# Patient Record
Sex: MALE | Race: WHITE | NOT HISPANIC OR LATINO
[De-identification: names, ages, dates, MRNs, and addresses within clinical notes are randomized per-mention and may not be internally consistent; named-entity substitution may affect disease eponyms.]

---

## 2017-04-21 PROBLEM — Z00.00 ENCOUNTER FOR PREVENTIVE HEALTH EXAMINATION: Status: ACTIVE | Noted: 2017-04-21

## 2017-08-25 ENCOUNTER — APPOINTMENT (OUTPATIENT)
Dept: VASCULAR SURGERY | Facility: CLINIC | Age: 62
End: 2017-08-25
Payer: MEDICAID

## 2017-08-25 DIAGNOSIS — I83.892 VARICOSE VEINS OF LEFT LOWER EXTREMITY WITH OTHER COMPLICATIONS: ICD-10-CM

## 2017-08-25 PROCEDURE — 93971 EXTREMITY STUDY: CPT

## 2017-08-25 PROCEDURE — 99213 OFFICE O/P EST LOW 20 MIN: CPT | Mod: 25

## 2017-10-20 ENCOUNTER — APPOINTMENT (OUTPATIENT)
Dept: VASCULAR SURGERY | Facility: CLINIC | Age: 62
End: 2017-10-20
Payer: MEDICAID

## 2017-10-20 PROCEDURE — 36475 ENDOVENOUS RF 1ST VEIN: CPT

## 2017-10-23 ENCOUNTER — APPOINTMENT (OUTPATIENT)
Dept: VASCULAR SURGERY | Facility: CLINIC | Age: 62
End: 2017-10-23
Payer: MEDICAID

## 2017-10-23 PROCEDURE — 93971 EXTREMITY STUDY: CPT

## 2017-10-23 PROCEDURE — 99212 OFFICE O/P EST SF 10 MIN: CPT | Mod: 25

## 2017-11-27 ENCOUNTER — APPOINTMENT (OUTPATIENT)
Dept: VASCULAR SURGERY | Facility: CLINIC | Age: 62
End: 2017-11-27
Payer: MEDICAID

## 2017-11-27 PROCEDURE — 99212 OFFICE O/P EST SF 10 MIN: CPT

## 2020-12-14 ENCOUNTER — EMERGENCY (EMERGENCY)
Facility: HOSPITAL | Age: 65
LOS: 1 days | Discharge: HOME | End: 2020-12-14
Admitting: EMERGENCY MEDICINE
Payer: MEDICARE

## 2020-12-14 VITALS
OXYGEN SATURATION: 96 % | HEART RATE: 81 BPM | RESPIRATION RATE: 18 BRPM | DIASTOLIC BLOOD PRESSURE: 96 MMHG | WEIGHT: 179.9 LBS | SYSTOLIC BLOOD PRESSURE: 136 MMHG | TEMPERATURE: 98 F

## 2020-12-14 DIAGNOSIS — M25.511 PAIN IN RIGHT SHOULDER: ICD-10-CM
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
## 2020-12-14 DIAGNOSIS — S42.291A OTHER DISPLACED FRACTURE OF UPPER END OF RIGHT HUMERUS, INITIAL ENCOUNTER FOR CLOSED FRACTURE: ICD-10-CM
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
## 2020-12-14 DIAGNOSIS — M25.521 PAIN IN RIGHT ELBOW: ICD-10-CM
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
## 2020-12-14 PROCEDURE — 73030 X-RAY EXAM OF SHOULDER: CPT | Mod: 26,RT

## 2020-12-14 PROCEDURE — 23600 CLTX PROX HUMRL FX W/O MNPJ: CPT | Mod: 54,RT

## 2020-12-14 PROCEDURE — 99284 EMERGENCY DEPT VISIT MOD MDM: CPT | Mod: 25,57

## 2020-12-14 PROCEDURE — 73060 X-RAY EXAM OF HUMERUS: CPT | Mod: 26,RT

## 2020-12-14 PROCEDURE — 73080 X-RAY EXAM OF ELBOW: CPT | Mod: 26,RT

## 2020-12-14 NOTE — ED PROVIDER NOTE - CHPI ED SYMPTOMS NEG
no weakness/no numbness/no dizziness, no headache, no chest pain, no shortness of breath, no nausea, and no vomiting.

## 2020-12-14 NOTE — ED PROVIDER NOTE - MUSCULOSKELETAL, MLM
Tenderness along the lateral aspect of the right shoulder and is mildly tender along the posterior right elbow. He also has a small amount of ecchymosis to the proximal right arm. No obvious deformity. No palpable crepitus. NVID.

## 2020-12-14 NOTE — ED PROVIDER NOTE - OBJECTIVE STATEMENT
64 y/o Male presents with RUE pain for the past 1 week which worsened yesterday. He states he slipped and fell onto the right shoulder and right upper arm 1 week ago causing severe pain to the site that is worsened with movement. He states he has been waiting for the pain to improve but as he went to open the fridge yesterday, the door was stuck and as he pulled the door he felt a sudden severe pain to the right shoulder and elbow. He states he now has difficulty moving the shoulder and elbow due to pain. Pt is concerned he may have an occult fracture that was never treated as he did not seek medical treatment at that time. No other associated injuries.   Denies paresthesias, numbness, weakness, open wound, dizziness, headache, chest pain, shortness of breath, nausea and vomiting. 66 y/o Male presents with RUE pain for the past 1 week which worsened yesterday. He states he slipped and fell onto the right shoulder and right upper arm 1 week ago causing severe pain to the site that is worsened with movement. He states he has been waiting for the pain to improve but as he went to open the fridge yesterday, the door was stuck and as he pulled the door he felt a sudden severe pain to the right shoulder and elbow. He states he now has difficulty moving the shoulder and elbow due to pain. Pt is concerned he may have an occult fracture that was never treated as he did not seek medical treatment at that time. No other associated injuries.   Denies paresthesias, numbness, weakness, open wound, dizziness, headache, chest pain, shortness of breath, nausea and vomiting.

## 2020-12-14 NOTE — ED PROVIDER NOTE - CARE PROVIDER_API CALL
Alejandro Hess  ORTHOPAEDIC SURGERY  [address], [address] [place] [address]  Phone: [phone]  Fax: [phone]  Follow Up Time:

## 2020-12-14 NOTE — ED PROVIDER NOTE - CLINICAL SUMMARY MEDICAL DECISION MAKING FREE TEXT BOX
Xrays reveal a fracture of the right humerus. RUE was placed in a sling without complication. NV status intact. Supportive care instructions and pt agrees to F/U with Ortho this week. Strict return precautions reviewed with pt in which pt verbalizes understanding and agrees to.

## 2020-12-14 NOTE — ED PROVIDER NOTE - PATIENT PORTAL LINK FT
You can access the FollowMyHealth Patient Portal offered by [facility] by registering at the following website: [email or web address]. By joining [employer]’s FollowMyHealth portal, you will also be able to view your health information using other applications (apps) compatible with our system.

## 2020-12-14 NOTE — ED PROVIDER NOTE - NSFOLLOWUPINSTRUCTIONS_ED_ALL_ED_FT
Right Humerus Fracture    A fracture is a break in one of your bones. This can occur from a variety of injuries, especially traumatic ones. Symptoms include pain, bruising, or swelling. Do not use the injured limb. If a fracture is in one of the bones below your waist, do not put weight on that limb unless instructed to do so by your healthcare provider. Crutches or a cane may have been provided. A splint or cast may have been applied by your health care provider. Make sure to keep it dry and follow up with an orthopedist as instructed.    Please follow up with Dr. Hess (Orthopedist) in 1-3 days for further evaluation and management as discussed.    SEEK IMMEDIATE MEDICAL CARE IF YOU HAVE ANY OF THE FOLLOWING SYMPTOMS: numbness, tingling, increasing pain, or weakness in any part of the injured limb.

## 2020-12-23 ENCOUNTER — EMERGENCY (EMERGENCY)
Facility: HOSPITAL | Age: 65
LOS: 1 days | Discharge: HOME | End: 2020-12-23
Attending: EMERGENCY MEDICINE | Admitting: EMERGENCY MEDICINE
Payer: MEDICARE

## 2020-12-23 VITALS
OXYGEN SATURATION: 97 % | DIASTOLIC BLOOD PRESSURE: 87 MMHG | TEMPERATURE: 98 F | SYSTOLIC BLOOD PRESSURE: 121 MMHG | WEIGHT: 160.06 LBS | RESPIRATION RATE: 18 BRPM | HEART RATE: 86 BPM | HEIGHT: 67 IN
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
## 2020-12-23 DIAGNOSIS — F10.129 ALCOHOL ABUSE WITH INTOXICATION, UNSPECIFIED: ICD-10-CM
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
## 2020-12-23 DIAGNOSIS — S01.81XA LACERATION WITHOUT FOREIGN BODY OF OTHER PART OF HEAD, INITIAL ENCOUNTER: ICD-10-CM

## 2020-12-23 DIAGNOSIS — Z23 ENCOUNTER FOR IMMUNIZATION: ICD-10-CM

## 2020-12-23 PROCEDURE — 70486 CT MAXILLOFACIAL W/O DYE: CPT | Mod: 26

## 2020-12-23 PROCEDURE — 70450 CT HEAD/BRAIN W/O DYE: CPT | Mod: 26

## 2020-12-23 PROCEDURE — 99285 EMERGENCY DEPT VISIT HI MDM: CPT

## 2020-12-23 RX ORDER — TETANUS TOXOID, REDUCED DIPHTHERIA TOXOID AND ACELLULAR PERTUSSIS VACCINE, ADSORBED 5; 2.5; 8; 8; 2.5 [IU]/.5ML; [IU]/.5ML; UG/.5ML; UG/.5ML; UG/.5ML
0.5 SUSPENSION INTRAMUSCULAR ONCE
Refills: 0 | Status: COMPLETED | OUTPATIENT
Start: 2020-12-23 | End: 2020-12-23

## 2020-12-23 RX ADMIN — TETANUS TOXOID, REDUCED DIPHTHERIA TOXOID AND ACELLULAR PERTUSSIS VACCINE, ADSORBED 0.5 MILLILITER(S): 5; 2.5; 8; 8; 2.5 SUSPENSION INTRAMUSCULAR at 23:17

## 2020-12-23 NOTE — ED ADULT TRIAGE NOTE - CHIEF COMPLAINT QUOTE
BIBA for trip and fall sustaining laceration to left temporal area. Bleeding controlled. Admits to alcohol use today. Pt states he takes aspirin.

## 2020-12-24 VITALS
DIASTOLIC BLOOD PRESSURE: 75 MMHG | OXYGEN SATURATION: 97 % | TEMPERATURE: 98 F | HEART RATE: 69 BPM | RESPIRATION RATE: 17 BRPM | SYSTOLIC BLOOD PRESSURE: 137 MMHG

## 2020-12-24 LAB — ETHANOL SERPL-MCNC: 152 MG/DL — HIGH

## 2020-12-24 RX ORDER — HALOPERIDOL DECANOATE 100 MG/ML
2.5 INJECTION INTRAMUSCULAR ONCE
Refills: 0 | Status: DISCONTINUED | OUTPATIENT
Start: 2020-12-24 | End: 2020-12-24

## 2020-12-24 RX ORDER — HALOPERIDOL DECANOATE 100 MG/ML
2.5 INJECTION INTRAMUSCULAR ONCE
Refills: 0 | Status: COMPLETED | OUTPATIENT
Start: 2020-12-24 | End: 2020-12-24

## 2020-12-24 RX ADMIN — Medication 1 TABLET(S): at 00:02

## 2020-12-24 RX ADMIN — Medication 50 MILLIGRAM(S): at 04:26

## 2020-12-24 RX ADMIN — HALOPERIDOL DECANOATE 2.5 MILLIGRAM(S): 100 INJECTION INTRAMUSCULAR at 05:23

## 2020-12-24 RX ADMIN — Medication 1 MILLIGRAM(S): at 05:23

## 2020-12-24 NOTE — ED ADULT NURSE REASSESSMENT NOTE - NS ED NURSE REASSESS COMMENT FT1
Pt witnessed lowering self to ground, no head trauma or other injuries, transferred back to bed in stable condition, VS per flowsheet. JATIN Simms notified and aware. Pt is awake and alert x4, no neuro deficits on assessment. Call bell within reach, stretcher alarm in place, pt verbalizes understanding of when to call ED staff if requiring assistance to use bathroom or other inquiries. Pt witnessed lowering self to ground, no head trauma or other injuries, transferred back to bed in stable condition, VS per flowsheet. JATIN Simms notified and aware. Pt is awake and alert x4, no LoC, no neuro deficits on assessment. Call bell within reach, stretcher alarm in place, pt verbalizes understanding of when to call ED staff if requiring assistance to use bathroom or other inquiries. Pt witnessed lowering self to ground, no head trauma or other injuries, transferred back to bed in stable condition, VS per flowsheet. States "I wanted to throw out this trash in the bin and  the trash on the floor". JATIN Simms notified and aware. Pt is awake and alert x4, no LoC, no neuro deficits on assessment. Call bell within reach, stretcher alarm in place, pt verbalizes understanding of when to call ED staff if requiring assistance to use bathroom or other inquiries.

## 2020-12-24 NOTE — ED ADULT NURSE REASSESSMENT NOTE - NS ED NURSE REASSESS COMMENT FT1
Patient continues to attempt to get out of bed, unsteady on his feet. Patient is aox4 but adamant about 'finding his bottle of vodka.' Patient not following directions. As per provider, patient medicated as ordered and attached to continuos pulse ox monitoring. Safety precautions in place and maintained. Saturating at 99% RA. Fall precautions also in place including bed alarm, red blanket and socks.

## 2020-12-24 NOTE — ED ADULT NURSE REASSESSMENT NOTE - GENERAL PATIENT STATE
awakens with ease/comfortable appearance/cooperative/resting/sleeping
comfortable appearance/resting/sleeping

## 2020-12-24 NOTE — ED ADULT NURSE REASSESSMENT NOTE - COMFORT CARE
plan of care explained/wait time explained
plan of care explained/side rails up/wait time explained/warm blanket provided

## 2020-12-24 NOTE — ED PROVIDER NOTE - CLINICAL SUMMARY MEDICAL DECISION MAKING FREE TEXT BOX
ams, possibly 2/2 substance, no trauma noted or reported, protecting airway, will monitor for safety and reassessment for mental status  Patient endorses [place] down [place] [address] after drinking.   lac to left temple linear.   Will observe till sobriety

## 2020-12-24 NOTE — ED PROVIDER NOTE - PHYSICAL EXAMINATION
CON: somnolent, arousable to loud verbal or painful stimuli,   HENMT: no pooling of secretion, protecting airway, no facial ecchymosis or evidence of trauma, perrl  HEAD: no obvious hematoma or laceration,   CV: rrr,   RESP: chest rise, breathing spontaneously,   GI: soft,   SKIN: lac to left temple small no bleeding  MSK: no deformity noted,   NEURO: limited exam 2/2 mental status

## 2020-12-24 NOTE — ED PROVIDER NOTE - PATIENT PORTAL LINK FT
You can access the FollowMyHealth Patient Portal offered by [facility] by registering at the following website: [email or web address]. By joining [employer]’s FollowMyHealth portal, you will also be able to view your health information using other applications (apps) compatible with our system.

## 2020-12-24 NOTE — ED ADULT NURSE REASSESSMENT NOTE - NSIMPLEMENTINTERV_GEN_ALL_ED
Implemented All Fall with Harm Risk Interventions:  [place] to call system. Call bell, personal items and telephone within reach. Instruct patient to call for assistance. Room bathroom lighting operational. Non-slip footwear when patient is off stretcher. Physically safe environment: no spills, clutter or unnecessary equipment. Stretcher in lowest position, wheels locked, appropriate side rails in place. Provide visual cue, wrist band, yellow gown, etc. Monitor gait and stability. Monitor for mental status changes and reorient to person, place, and time. Review medications for side effects contributing to fall risk. Reinforce activity limits and safety measures with patient and family. Provide visual clues: red socks.

## 2020-12-24 NOTE — ED ADULT NURSE NOTE - NSIMPLEMENTINTERV_GEN_ALL_ED
Implemented All Fall Risk Interventions:  [place] to call system. Call bell, personal items and telephone within reach. Instruct patient to call for assistance. Room bathroom lighting operational. Non-slip footwear when patient is off stretcher. Physically safe environment: no spills, clutter or unnecessary equipment. Stretcher in lowest position, wheels locked, appropriate side rails in place. Provide visual cue, wrist band, yellow gown, etc. Monitor gait and stability. Monitor for mental status changes and reorient to person, place, and time. Review medications for side effects contributing to fall risk. Reinforce activity limits and safety measures with patient and family.

## 2020-12-24 NOTE — ED ADULT NURSE NOTE - OBJECTIVE STATEMENT
65y male presents to ED c/o mechanical fall. States tripped and fell, +laceration to L forehead. Takes aspirin daily. Pt is AOx4, no neuro deficits present on arrival. Denies LoC, weakness, dizziness, n/v. 65y male presents to ED c/o mechanical fall. States tripped and fell, +laceration to L forehead. Takes aspirin daily. States has been having R arm pain prior to fall. Pt is AOx4, no neuro deficits present on arrival. Denies LoC, weakness, dizziness, n/v.

## 2020-12-24 NOTE — ED PROVIDER NOTE - PROGRESS NOTE DETAILS
pt signed out in AM, S/P sedation for alcohol intox and unsteady gait. Pt now alert, oriented, walking steadily. WIll DC home

## 2021-03-25 NOTE — ED PROVIDER NOTE - INTERNATIONAL TRAVEL
Problem: Pain:  Goal: Pain level will decrease  Description: Pain level will decrease  Outcome: Met This Shift  Goal: Control of acute pain  Description: Control of acute pain  Outcome: Met This Shift     Problem:  Bowel/Gastric:  Goal: Bowel function will improve  Description: Bowel function will improve  Outcome: Met This Shift  Goal: Diagnostic test results will improve  Description: Diagnostic test results will improve  Outcome: Met This Shift  Goal: Occurrences of nausea will decrease  Description: Occurrences of nausea will decrease  Outcome: Met This Shift  Goal: Occurrences of vomiting will decrease  Description: Occurrences of vomiting will decrease  Outcome: Met This Shift     Problem: Fluid Volume:  Goal: Maintenance of adequate hydration will improve  Description: Maintenance of adequate hydration will improve  Outcome: Met This Shift
ambulatory
No

## 2021-12-15 ENCOUNTER — EMERGENCY (EMERGENCY)
Facility: HOSPITAL | Age: 66
LOS: 1 days | Discharge: HOME | End: 2021-12-15
Attending: EMERGENCY MEDICINE | Admitting: EMERGENCY MEDICINE
Payer: MEDICARE

## 2021-12-15 VITALS
RESPIRATION RATE: 18 BRPM | TEMPERATURE: 98 F | HEIGHT: 67 IN | DIASTOLIC BLOOD PRESSURE: 96 MMHG | SYSTOLIC BLOOD PRESSURE: 124 MMHG | WEIGHT: 149.91 LBS | OXYGEN SATURATION: 98 % | HEART RATE: 87 BPM

## 2021-12-15 VITALS
DIASTOLIC BLOOD PRESSURE: 81 MMHG | RESPIRATION RATE: 17 BRPM | OXYGEN SATURATION: 99 % | SYSTOLIC BLOOD PRESSURE: 119 MMHG | TEMPERATURE: 98 F | HEART RATE: 81 BPM

## 2021-12-15 DIAGNOSIS — F32.9 MAJOR DEPRESSIVE DISORDER, SINGLE EPISODE, UNSPECIFIED: ICD-10-CM

## 2021-12-15 DIAGNOSIS — R21 RASH AND OTHER NONSPECIFIC SKIN ERUPTION: ICD-10-CM

## 2021-12-15 PROBLEM — F10.10 ALCOHOL ABUSE, UNCOMPLICATED: Chronic | Status: ACTIVE | Noted: 2020-12-24

## 2021-12-15 LAB
ALBUMIN SERPL ELPH-MCNC: 3.2 G/DL — LOW (ref 3.4–5)
ALP SERPL-CCNC: 90 U/L — SIGNIFICANT CHANGE UP (ref 40–120)
ALT FLD-CCNC: 36 U/L — SIGNIFICANT CHANGE UP (ref 12–42)
ANION GAP SERPL CALC-SCNC: 9 MMOL/L — SIGNIFICANT CHANGE UP (ref 9–16)
APTT BLD: 37.5 SEC — HIGH (ref 27.5–35.5)
AST SERPL-CCNC: 25 U/L — SIGNIFICANT CHANGE UP (ref 15–37)
BASOPHILS # BLD AUTO: 0.09 K/UL — SIGNIFICANT CHANGE UP (ref 0–0.2)
BASOPHILS NFR BLD AUTO: 1.1 % — SIGNIFICANT CHANGE UP (ref 0–2)
BILIRUB SERPL-MCNC: 0.2 MG/DL — SIGNIFICANT CHANGE UP (ref 0.2–1.2)
BUN SERPL-MCNC: 14 MG/DL — SIGNIFICANT CHANGE UP (ref 7–23)
CALCIUM SERPL-MCNC: 8.2 MG/DL — LOW (ref 8.5–10.5)
CHLORIDE SERPL-SCNC: 106 MMOL/L — SIGNIFICANT CHANGE UP (ref 96–108)
CO2 SERPL-SCNC: 28 MMOL/L — SIGNIFICANT CHANGE UP (ref 22–31)
CREAT SERPL-MCNC: 1.05 MG/DL — SIGNIFICANT CHANGE UP (ref 0.5–1.3)
EOSINOPHIL # BLD AUTO: 0.36 K/UL — SIGNIFICANT CHANGE UP (ref 0–0.5)
EOSINOPHIL NFR BLD AUTO: 4.5 % — SIGNIFICANT CHANGE UP (ref 0–6)
GLUCOSE SERPL-MCNC: 136 MG/DL — HIGH (ref 70–99)
HCT VFR BLD CALC: 42.4 % — SIGNIFICANT CHANGE UP (ref 39–50)
HGB BLD-MCNC: 15 G/DL — SIGNIFICANT CHANGE UP (ref 13–17)
HIV 1 & 2 AB SERPL IA.RAPID: SIGNIFICANT CHANGE UP
IMM GRANULOCYTES NFR BLD AUTO: 0.5 % — SIGNIFICANT CHANGE UP (ref 0–1.5)
INR BLD: 0.98 — SIGNIFICANT CHANGE UP (ref 0.88–1.16)
LG PLATELETS BLD QL AUTO: SLIGHT — SIGNIFICANT CHANGE UP
LYMPHOCYTES # BLD AUTO: 2.92 K/UL — SIGNIFICANT CHANGE UP (ref 1–3.3)
LYMPHOCYTES # BLD AUTO: 36.6 % — SIGNIFICANT CHANGE UP (ref 13–44)
MACROCYTES BLD QL: SLIGHT — SIGNIFICANT CHANGE UP
MANUAL SMEAR VERIFICATION: SIGNIFICANT CHANGE UP
MCHC RBC-ENTMCNC: 33.3 PG — SIGNIFICANT CHANGE UP (ref 27–34)
MCHC RBC-ENTMCNC: 35.4 GM/DL — SIGNIFICANT CHANGE UP (ref 32–36)
MCV RBC AUTO: 94.2 FL — SIGNIFICANT CHANGE UP (ref 80–100)
MICROCYTES BLD QL: SLIGHT — SIGNIFICANT CHANGE UP
MONOCYTES # BLD AUTO: 1.17 K/UL — HIGH (ref 0–0.9)
MONOCYTES NFR BLD AUTO: 14.7 % — HIGH (ref 2–14)
NEUTROPHILS # BLD AUTO: 3.39 K/UL — SIGNIFICANT CHANGE UP (ref 1.8–7.4)
NEUTROPHILS NFR BLD AUTO: 42.6 % — LOW (ref 43–77)
NRBC # BLD: 0 /100 WBCS — SIGNIFICANT CHANGE UP (ref 0–0)
PLAT MORPH BLD: NORMAL — SIGNIFICANT CHANGE UP
PLATELET # BLD AUTO: 343 K/UL — SIGNIFICANT CHANGE UP (ref 150–400)
PLATELET COUNT - ESTIMATE: ABNORMAL
POLYCHROMASIA BLD QL SMEAR: SLIGHT — SIGNIFICANT CHANGE UP
POTASSIUM SERPL-MCNC: 3.5 MMOL/L — SIGNIFICANT CHANGE UP (ref 3.5–5.3)
POTASSIUM SERPL-SCNC: 3.5 MMOL/L — SIGNIFICANT CHANGE UP (ref 3.5–5.3)
PROT SERPL-MCNC: 6.4 G/DL — SIGNIFICANT CHANGE UP (ref 6.4–8.2)
PROTHROM AB SERPL-ACNC: 11.6 SEC — SIGNIFICANT CHANGE UP (ref 10.6–13.6)
RBC # BLD: 4.5 M/UL — SIGNIFICANT CHANGE UP (ref 4.2–5.8)
RBC # FLD: 13 % — SIGNIFICANT CHANGE UP (ref 10.3–14.5)
RBC BLD AUTO: ABNORMAL
SODIUM SERPL-SCNC: 143 MMOL/L — SIGNIFICANT CHANGE UP (ref 132–145)
WBC # BLD: 7.97 K/UL — SIGNIFICANT CHANGE UP (ref 3.8–10.5)
WBC # FLD AUTO: 7.97 K/UL — SIGNIFICANT CHANGE UP (ref 3.8–10.5)

## 2021-12-15 PROCEDURE — 99284 EMERGENCY DEPT VISIT MOD MDM: CPT

## 2021-12-15 NOTE — ED PROVIDER NOTE - CLINICAL SUMMARY MEDICAL DECISION MAKING FREE TEXT BOX
Patient presents to the ED complaining of bilateral foot rash for the past 2 days. Patient presents with multiple petechiae to bilateral ankles extending up the lower legs bilaterally. Concern for possible vasculitis. Will get blood work.

## 2021-12-15 NOTE — ED PROVIDER NOTE - PATIENT PORTAL LINK FT
You can access the FollowMyHealth Patient Portal offered by [facility] by registering at the following website: [email or web address]. By joining [employer]’s FollowMyHealth portal, you will also be able to view your health information using other applications (apps) compatible with our system.

## 2021-12-15 NOTE — ED PROVIDER NOTE - PROVIDER TOKENS
PROVIDER:[TOKEN:[[id number]],SCHEDULEDAPPT:[12/17/2021],SCHEDULEDAPPTTIME:[01:00 PM]],PROVIDER:[TOKEN:[[id number]],SCHEDULEDAPPT:[01/11/2022],SCHEDULEDAPPTTIME:[03:30 AM]]

## 2021-12-15 NOTE — ED PROVIDER NOTE - CARE PROVIDERS DIRECT ADDRESSES
,[email or web address]

## 2021-12-15 NOTE — ED PROVIDER NOTE - CARE PROVIDER_API CALL
Zana Dexter)  Internal Medicine; Rheumatology  [address] [place] [address], [address] [place] [address]  Phone: [phone]  Fax: [phone]  Scheduled Appointment: 12/17/2021 01:00 PM    Phuc Lopes)  Internal Medicine  [address] [address]  Phone: [phone]  Fax: [phone]  Scheduled Appointment: 01/11/2022 03:30 AM

## 2021-12-15 NOTE — ED PROVIDER NOTE - NSFOLLOWUPINSTRUCTIONS_ED_ALL_ED_FT
-PLEASE FOLLOW-UP WITH OUR RHEUMATOLOGIST ON FRIDAY AS DISCUSSED.  SEE ABOVE FOR SET APPOINTMENTS.  BRING ALL PAPERWORK FROM TODAY'S VISIT TO YOUR FOLLOW-UP VISIT.  THERE IS A CONCERN FOR A POSSIBLE VASCULITIS, THAT WILL NEED ADDITIONAL WORK UP BY A SPECIALIST, SO PLEASE KEEP YOUR APPOINTMENT.     -PLEASE RETURN TO THE ER IMMEDIATELY OR CALL 911 FOR ANY HIGH FEVER, TROUBLE BREATHING, VOMITING, SEVERE PAIN, OR ANY OTHER CONCERNS.

## 2021-12-15 NOTE — ED ADULT NURSE NOTE - NSICDXPASTMEDICALHX_GEN_ALL_CORE_FT
PAST MEDICAL HISTORY:  ETOH abuse       Depression      PAST MEDICAL HISTORY:  Depression     ETOH abuse

## 2021-12-15 NOTE — ED PROVIDER NOTE - PROGRESS NOTE DETAILS
Patient  Marjorie called ED requesting update with patient dispo. Call back to [phone] Obtained verbal consent from patient to discuss his results and treatment plan with his , Marjorie.  I discussed all results with both pt and his SW.  Discussed concerned of vasculitis and need to f/u with a rheumatologist.  Able to get him an appt for 12/17/21 @1pm with Dr. Farmer.  SW will ensure pt makes it to his appt.  Pt also needs new PCP.  Made additional appt with Dr. Lopes for 1/11/22.

## 2021-12-15 NOTE — ED PROVIDER NOTE - OBJECTIVE STATEMENT
67 y/o male with PHMx of depression (on bupropion and Respirtone) presents to the ED complaining of bilateral foot and lower leg rash. Presents with small round red spots diffusely across the bilateral lower legs and feet. Patient states that he has had the rash for 2 days, but has no other associated symptoms like fever, chills, nausea, vomiting or pain. Patient has been using hydrogen peroxide to clean the wounds.

## 2021-12-15 NOTE — ED PROVIDER NOTE - PHYSICAL EXAMINATION
VITAL SIGNS: I have reviewed nursing notes and confirm.  CONSTITUTIONAL: Well-developed; well-nourished; in no acute distress.  SKIN: multiple petechiae to the bilateral ankles and lower legs. No edema  HEAD: Normocephalic; atraumatic.  EYES: PERRL, EOM intact; conjunctiva and sclera clear.  ENT: No nasal discharge; airway clear.  NECK: Supple; non tender.  CARD: S1, S2 normal; no murmurs, gallops, or rubs. Regular rate and rhythm.  RESP: No wheezes, rales or rhonchi.  ABD: Normal bowel sounds; soft; non-distended; non-tender; no hepatosplenomegaly.  EXT: Normal ROM. No clubbing, cyanosis or edema. DP 2+ bilaterally  NEURO: Alert, oriented. Grossly unremarkable.  PSYCH: Cooperative, appropriate.

## 2021-12-15 NOTE — ED BEHAVIORAL HEALTH NOTE - BEHAVIORAL HEALTH NOTE
SW was consulted to the ED by Provider to assist PT with follow up care.  PT is scheduled for rheumatology and PCP follow up appt for 12/17 and 1/11.   Team made aware and ALEJA made available for further assistance.

## 2021-12-15 NOTE — ED ADULT NURSE NOTE - NSIMPLEMENTINTERV_GEN_ALL_ED
Implemented All Universal Safety Interventions:  [place] to call system. Call bell, personal items and telephone within reach. Instruct patient to call for assistance. Room bathroom lighting operational. Non-slip footwear when patient is off stretcher. Physically safe environment: no spills, clutter or unnecessary equipment. Stretcher in lowest position, wheels locked, appropriate side rails in place.

## 2021-12-17 ENCOUNTER — APPOINTMENT (OUTPATIENT)
Dept: RHEUMATOLOGY | Facility: CLINIC | Age: 66
End: 2021-12-17

## 2022-02-01 ENCOUNTER — NON-APPOINTMENT (OUTPATIENT)
Age: 67
End: 2022-02-01

## 2022-02-01 ENCOUNTER — APPOINTMENT (OUTPATIENT)
Dept: INTERNAL MEDICINE | Facility: CLINIC | Age: 67
End: 2022-02-01

## 2022-02-01 DIAGNOSIS — Z13.1 ENCOUNTER FOR SCREENING FOR DIABETES MELLITUS: ICD-10-CM

## 2022-02-01 DIAGNOSIS — Z13.220 ENCOUNTER FOR SCREENING FOR LIPOID DISORDERS: ICD-10-CM

## 2022-02-01 DIAGNOSIS — R07.89 OTHER CHEST PAIN: ICD-10-CM

## 2022-02-01 DIAGNOSIS — Z12.11 ENCOUNTER FOR SCREENING FOR MALIGNANT NEOPLASM OF COLON: ICD-10-CM

## 2022-02-01 DIAGNOSIS — Z23 ENCOUNTER FOR IMMUNIZATION: ICD-10-CM

## 2022-02-01 DIAGNOSIS — R39.9 UNSPECIFIED SYMPTOMS AND SIGNS INVOLVING THE GENITOURINARY SYSTEM: ICD-10-CM

## 2022-03-08 ENCOUNTER — TRANSCRIPTION ENCOUNTER (OUTPATIENT)
Age: 67
End: 2022-03-08

## 2022-06-19 ENCOUNTER — EMERGENCY (EMERGENCY)
Facility: HOSPITAL | Age: 67
LOS: 1 days | Discharge: HOME | End: 2022-06-19
Attending: EMERGENCY MEDICINE | Admitting: EMERGENCY MEDICINE
Payer: MEDICARE

## 2022-06-19 VITALS
HEART RATE: 77 BPM | SYSTOLIC BLOOD PRESSURE: 140 MMHG | TEMPERATURE: 98 F | RESPIRATION RATE: 17 BRPM | OXYGEN SATURATION: 99 % | DIASTOLIC BLOOD PRESSURE: 101 MMHG | HEIGHT: 67 IN

## 2022-06-19 PROBLEM — F32.A DEPRESSION, UNSPECIFIED: Chronic | Status: ACTIVE | Noted: 2021-12-15

## 2022-06-19 PROCEDURE — 99283 EMERGENCY DEPT VISIT LOW MDM: CPT

## 2022-06-19 NOTE — ED PROVIDER NOTE - PATIENT PORTAL LINK FT
You can access the FollowMyHealth Patient Portal offered by [facility] by registering at the following website: [email or web address]. By joining [employer]’s FollowMyHealth portal, you will also be able to view your health information using other applications (apps) compatible with our system.

## 2022-06-19 NOTE — ED PROVIDER NOTE - OBJECTIVE STATEMENT
65 yo male pt, of schizoaffective dz and alcohol abuse, presents by EMS from street. brought from liquor store. unclear complains. As per pt he had some alcohol earlier but is feeling fine. no falls/trauma., no medical complains at this time

## 2022-06-22 DIAGNOSIS — F32.A DEPRESSION, UNSPECIFIED: ICD-10-CM

## 2022-06-22 DIAGNOSIS — F10.129 ALCOHOL ABUSE WITH INTOXICATION, UNSPECIFIED: ICD-10-CM

## 2022-06-22 DIAGNOSIS — F25.9 SCHIZOAFFECTIVE DISORDER, UNSPECIFIED: ICD-10-CM

## 2022-08-10 ENCOUNTER — EMERGENCY (EMERGENCY)
Facility: HOSPITAL | Age: 67
LOS: 1 days | Discharge: HOME | End: 2022-08-10
Admitting: EMERGENCY MEDICINE

## 2022-08-10 VITALS
HEART RATE: 90 BPM | TEMPERATURE: 98 F | OXYGEN SATURATION: 93 % | WEIGHT: 149.91 LBS | HEIGHT: 67 IN | DIASTOLIC BLOOD PRESSURE: 94 MMHG | RESPIRATION RATE: 18 BRPM | SYSTOLIC BLOOD PRESSURE: 131 MMHG

## 2022-08-10 DIAGNOSIS — F10.10 ALCOHOL ABUSE, UNCOMPLICATED: ICD-10-CM

## 2022-08-10 DIAGNOSIS — R53.1 WEAKNESS: ICD-10-CM

## 2022-08-10 DIAGNOSIS — F20.9 SCHIZOPHRENIA, UNSPECIFIED: ICD-10-CM

## 2022-08-10 DIAGNOSIS — R42 DIZZINESS AND GIDDINESS: ICD-10-CM

## 2022-08-10 DIAGNOSIS — Z74.09 OTHER REDUCED MOBILITY: ICD-10-CM

## 2022-08-10 DIAGNOSIS — R29.6 REPEATED FALLS: ICD-10-CM

## 2022-08-10 DIAGNOSIS — Z20.822 CONTACT WITH AND (SUSPECTED) EXPOSURE TO COVID-19: ICD-10-CM

## 2022-08-10 LAB
ALBUMIN SERPL ELPH-MCNC: 3.3 G/DL — LOW (ref 3.4–5)
ALP SERPL-CCNC: 91 U/L — SIGNIFICANT CHANGE UP (ref 40–120)
ALT FLD-CCNC: 29 U/L — SIGNIFICANT CHANGE UP (ref 12–42)
ANION GAP SERPL CALC-SCNC: 8 MMOL/L — LOW (ref 9–16)
AST SERPL-CCNC: 29 U/L — SIGNIFICANT CHANGE UP (ref 15–37)
BASOPHILS # BLD AUTO: 0.08 K/UL — SIGNIFICANT CHANGE UP (ref 0–0.2)
BASOPHILS NFR BLD AUTO: 0.8 % — SIGNIFICANT CHANGE UP (ref 0–2)
BILIRUB SERPL-MCNC: 0.4 MG/DL — SIGNIFICANT CHANGE UP (ref 0.2–1.2)
BUN SERPL-MCNC: 21 MG/DL — SIGNIFICANT CHANGE UP (ref 7–23)
CALCIUM SERPL-MCNC: 9.1 MG/DL — SIGNIFICANT CHANGE UP (ref 8.5–10.5)
CHLORIDE SERPL-SCNC: 106 MMOL/L — SIGNIFICANT CHANGE UP (ref 96–108)
CK SERPL-CCNC: 194 U/L — SIGNIFICANT CHANGE UP (ref 39–308)
CO2 SERPL-SCNC: 28 MMOL/L — SIGNIFICANT CHANGE UP (ref 22–31)
CREAT SERPL-MCNC: 1.41 MG/DL — HIGH (ref 0.5–1.3)
EGFR: 55 ML/MIN/1.73M2 — LOW
EOSINOPHIL # BLD AUTO: 0.18 K/UL — SIGNIFICANT CHANGE UP (ref 0–0.5)
EOSINOPHIL NFR BLD AUTO: 1.9 % — SIGNIFICANT CHANGE UP (ref 0–6)
ETHANOL SERPL-MCNC: <3 MG/DL — SIGNIFICANT CHANGE UP
GLUCOSE BLDC GLUCOMTR-MCNC: 115 MG/DL — HIGH (ref 70–99)
GLUCOSE SERPL-MCNC: 116 MG/DL — HIGH (ref 70–99)
HCT VFR BLD CALC: 41.9 % — SIGNIFICANT CHANGE UP (ref 39–50)
HGB BLD-MCNC: 14.4 G/DL — SIGNIFICANT CHANGE UP (ref 13–17)
IMM GRANULOCYTES NFR BLD AUTO: 0.2 % — SIGNIFICANT CHANGE UP (ref 0–1.5)
LYMPHOCYTES # BLD AUTO: 2.29 K/UL — SIGNIFICANT CHANGE UP (ref 1–3.3)
LYMPHOCYTES # BLD AUTO: 24.1 % — SIGNIFICANT CHANGE UP (ref 13–44)
MCHC RBC-ENTMCNC: 32.1 PG — SIGNIFICANT CHANGE UP (ref 27–34)
MCHC RBC-ENTMCNC: 34.4 GM/DL — SIGNIFICANT CHANGE UP (ref 32–36)
MCV RBC AUTO: 93.5 FL — SIGNIFICANT CHANGE UP (ref 80–100)
MONOCYTES # BLD AUTO: 1.13 K/UL — HIGH (ref 0–0.9)
MONOCYTES NFR BLD AUTO: 11.9 % — SIGNIFICANT CHANGE UP (ref 2–14)
NEUTROPHILS # BLD AUTO: 5.79 K/UL — SIGNIFICANT CHANGE UP (ref 1.8–7.4)
NEUTROPHILS NFR BLD AUTO: 61.1 % — SIGNIFICANT CHANGE UP (ref 43–77)
NRBC # BLD: 0 /100 WBCS — SIGNIFICANT CHANGE UP (ref 0–0)
PLATELET # BLD AUTO: 275 K/UL — SIGNIFICANT CHANGE UP (ref 150–400)
POTASSIUM SERPL-MCNC: 3.7 MMOL/L — SIGNIFICANT CHANGE UP (ref 3.5–5.3)
POTASSIUM SERPL-SCNC: 3.7 MMOL/L — SIGNIFICANT CHANGE UP (ref 3.5–5.3)
PROT SERPL-MCNC: 6.5 G/DL — SIGNIFICANT CHANGE UP (ref 6.4–8.2)
RBC # BLD: 4.48 M/UL — SIGNIFICANT CHANGE UP (ref 4.2–5.8)
RBC # FLD: 12.8 % — SIGNIFICANT CHANGE UP (ref 10.3–14.5)
SARS-COV-2 RNA SPEC QL NAA+PROBE: SIGNIFICANT CHANGE UP
SODIUM SERPL-SCNC: 142 MMOL/L — SIGNIFICANT CHANGE UP (ref 132–145)
TROPONIN I, HIGH SENSITIVITY RESULT: 54 NG/L — SIGNIFICANT CHANGE UP
WBC # BLD: 9.49 K/UL — SIGNIFICANT CHANGE UP (ref 3.8–10.5)
WBC # FLD AUTO: 9.49 K/UL — SIGNIFICANT CHANGE UP (ref 3.8–10.5)

## 2022-08-10 PROCEDURE — 99285 EMERGENCY DEPT VISIT HI MDM: CPT

## 2022-08-10 PROCEDURE — 72125 CT NECK SPINE W/O DYE: CPT | Mod: 26,MH

## 2022-08-10 PROCEDURE — 70450 CT HEAD/BRAIN W/O DYE: CPT | Mod: 26,MH

## 2022-08-10 PROCEDURE — 93010 ELECTROCARDIOGRAM REPORT: CPT

## 2022-08-10 RX ORDER — SODIUM CHLORIDE 9 MG/ML
1000 INJECTION, SOLUTION INTRAVENOUS ONCE
Refills: 0 | Status: COMPLETED | OUTPATIENT
Start: 2022-08-10 | End: 2022-08-10

## 2022-08-10 RX ADMIN — SODIUM CHLORIDE 1000 MILLILITER(S): 9 INJECTION, SOLUTION INTRAVENOUS at 21:36

## 2022-08-10 NOTE — ED ADULT TRIAGE NOTE - NS ED NURSE AMBULANCES
Nel from Dr. Humphreys, has appointment today 10/30/2.   he is actually in office they need a referral.  Please fax att Nel   [phone]  
Referral faxed, left msg for pt  
[facility]

## 2022-08-10 NOTE — ED ADULT TRIAGE NOTE - CHIEF COMPLAINT QUOTE
Pt BIBA for weakness/loss of balance for several weeks. Bystander called because patient was having difficulty crossing the street. PMH of asthma. NIHSS negative in triage, no facial droop, B/L arm and leg strength equal.

## 2022-08-10 NOTE — ED ADULT NURSE NOTE - OBJECTIVE STATEMENT
Received patient to [facility] alert awake and responsive. Oriented x 2 with confusion as to time and appears disheveled. Patient reports he was having trouble crossing the street today. Reports living at home by himself, no home care and no family. Safety precautions in place and maintained. Blood work collected and sent to lab. Heplock 20G placed to right ac patent and intact free of redness swelling or abnormalities. IVFs in progress and well tolerated. Swabbed for covid and taken to ct scan. Will continue to monitor.

## 2022-08-11 VITALS
DIASTOLIC BLOOD PRESSURE: 90 MMHG | HEART RATE: 65 BPM | RESPIRATION RATE: 16 BRPM | OXYGEN SATURATION: 95 % | SYSTOLIC BLOOD PRESSURE: 160 MMHG

## 2022-08-11 LAB
APPEARANCE UR: CLEAR — SIGNIFICANT CHANGE UP
BACTERIA # UR AUTO: PRESENT /HPF
BILIRUB UR-MCNC: NEGATIVE — SIGNIFICANT CHANGE UP
COLOR SPEC: YELLOW — SIGNIFICANT CHANGE UP
COMMENT - URINE: SIGNIFICANT CHANGE UP
DIFF PNL FLD: ABNORMAL
GLUCOSE UR QL: NEGATIVE — SIGNIFICANT CHANGE UP
GRAN CASTS # UR COMP ASSIST: ABNORMAL /LPF
HYALINE CASTS # UR AUTO: SIGNIFICANT CHANGE UP /LPF (ref 0–2)
KETONES UR-MCNC: NEGATIVE — SIGNIFICANT CHANGE UP
LEUKOCYTE ESTERASE UR-ACNC: NEGATIVE — SIGNIFICANT CHANGE UP
NITRITE UR-MCNC: NEGATIVE — SIGNIFICANT CHANGE UP
PH UR: 6 — SIGNIFICANT CHANGE UP (ref 5–8)
PROT UR-MCNC: >=300 MG/DL
RBC CASTS # UR COMP ASSIST: ABNORMAL /HPF
SP GR SPEC: >=1.03 — SIGNIFICANT CHANGE UP (ref 1–1.03)
UROBILINOGEN FLD QL: 0.2 E.U./DL — SIGNIFICANT CHANGE UP
WBC UR QL: < 5 /HPF — SIGNIFICANT CHANGE UP

## 2022-08-11 NOTE — ED PROVIDER NOTE - PATIENT PORTAL LINK FT
You can access the FollowMyHealth Patient Portal offered by [facility] by registering at the following website: [email or web address]. By joining [employer]’s FollowMyHealth portal, you will also be able to view your health information using other applications (apps) compatible with our system.

## 2022-08-11 NOTE — ED ADULT NURSE REASSESSMENT NOTE - NS ED NURSE REASSESS COMMENT FT1
Pt refusing to wait for ambulette right home. Pt is adamant about leaving and does not want to be admitted. Ambulating with stretcher. Pt is AOx3, cab hailed and pt states will take cab home.

## 2022-08-11 NOTE — ED PROVIDER NOTE - PROGRESS NOTE DETAILS
pt does not want to stay in the hospital despite multiple offers for admission will return if changes his mind.

## 2022-08-11 NOTE — ED PROVIDER NOTE - PHYSICAL EXAMINATION
Physical Exam    Vital Signs: I have reviewed the initial vital signs.  Constitutional: well-nourished, appears stated age, no acute distress  Eyes: PERRLA, and symmetrical lids.  ENT: Neck supple with no adenopathy, moist MM.  Cardiovascular: regular rate, regular rhythm, well-perfused extremities  Respiratory: unlabored respiratory effort, clear to auscultation bilaterally  Gastrointestinal: soft, non-tender abdomen, no pulsatile mass  Musculoskeletal: supple neck, no lower extremity edema, no cspine ttp, full neck ROM  Integumentary: warm, dry, no rash  Neurologic: awake, alert, cranial nerves II-XII grossly intact, extremities’ motor and sensory functions grossly intact, head atraumatic with no ttp   Psychiatric: A&Ox3, appropriate mood, appropriate affect

## 2022-08-11 NOTE — ED PROVIDER NOTE - OBJECTIVE STATEMENT
66 yo M pmhx sig for schizo-affective and etoh abuse pw gradual onset difficulty ambulating ongoing for 2 wk in duration with wobbling while ambulating with a fall that occurred shortly pta with no head trauma or loc, additionally pt reports feeling week and dizzy. No ams or focal deficits.

## 2022-08-11 NOTE — ED PROVIDER NOTE - CLINICAL SUMMARY MEDICAL DECISION MAKING FREE TEXT BOX
68 yo m pw generalized weakness and difficulty ambulating w/o loc, but reports fall pta to the ED, in the ED pt is able to ambulate with assistance. No ams or focal defictis on exam. Plan: ct brain, cspine, cbc, bmp, trop, ecg, ua

## 2022-11-28 NOTE — ED ADULT TRIAGE NOTE - GLASGOW COMA SCALE: BEST VERBAL RESPONSE, MLM
Patient is a 17 y/o female with known cholelithiasis diagnosed a year ago at [facility], recent evaluation and was seen by Dr. Garcia and scheduled for CCY 12/9 but presented to the ED with abdominal pain. She notes pain was primarily Epigastric area at one point sharp and stabbing, associated with nausea and emesis. She had no ability to eat or tolerate fluids. No alleviating factors. She was admitted to pediatrics and treated for Gallstone Pancreatitis. Currently she feels much better. Notes at worse pain is 2/10 and doesn't feel like she would require medication. She notes her appetite returned. Mother present during interview.      PAST MEDICAL & SURGICAL HISTORY:  Cholelithiases  Prior Appendectomy         MEDICATIONS  (STANDING):  dextrose 5% + lactated ringers. - Pediatric 1000 milliLiter(s) (100 mL/Hr) IV Continuous <Continuous>  famotidine IV Intermittent - Peds 20 milliGRAM(s) IV Intermittent every 12 hours    MEDICATIONS  (PRN):  ibuprofen  Oral Tab/Cap - Peds. 400 milliGRAM(s) Oral every 6 hours PRN Mild Pain (1 - 3)  ondansetron IV Push - Peds 4 milliGRAM(s) IV Push every 6 hours PRN Nausea and/or Vomiting      Allergies  No Known Allergies        Review of Systems  General:  Denies Fatigue, Denies Fever, Denies Weakness ,Denies Weight Loss   HEENT: Denies Trouble Swallowing ,Denies  Sore Throat , Denies Change in hearing/vision/speech ,Denies Dizziness    Cardio: Denies  Chest Pain , Palpitations    Respiratory: Denies worsening of SOB, Denies Cough  Abdomen: See detailed HPI  Neuro: Denies Headache Denies Dizziness, Denies Paresthesias  MSK: Denies pain in Bones/Joints/Muscles   Psych: Patient denies depression, denies suicidal or homicidal ideations  Integ: Patient Denies rash, or new skin lesions       Vital Signs Last 24 Hrs  T(C): 36.9 (28 Nov 2022 07:15), Max: 37.3 (27 Nov 2022 19:20)  T(F): 98.4 (28 Nov 2022 07:15), Max: 99.1 (27 Nov 2022 19:20)  HR: 66 (28 Nov 2022 07:15) (66 - 98)  BP: 114/53 (28 Nov 2022 07:15) (97/50 - 121/71)  BP(mean): 69 (28 Nov 2022 04:10) (69 - 89)  RR: 18 (28 Nov 2022 07:15) (18 - 20)  SpO2: 96% (28 Nov 2022 07:15) (96% - 100%)    Parameters below as of 28 Nov 2022 07:15  Patient On (Oxygen Delivery Method): room air    Physical Exam  Gen: NAD  HEENT: NC/AT, Mucosal Membranes  Cardio: S1/S2 No S3/S4, Regular  Resp: CTA B/L  Abdomen: Soft, ND/NT  Neuro: AAOx3, Cranial Nerve II-XII intact   Extremities: FROM x 4  Skin: No jaundice     Labs:        11-27    141  |  105  |  <3<L>  ----------------------------<  102<H>  3.6   |  26  |  0.5      Calcium, Total Serum: 9.0 mg/dL (11-27-22 @ 09:04)      LFTs:             6.4  | 0.4  | 47       ------------------[116     ( 27 Nov 2022 09:04 )  4.4  | 0.2  | 295         Lipase:80         RADIOLOGY & ADDITIONAL STUDIES:  CT Abdomen and Pelvis w/ Oral Cont and w/ IV Cont 11.25.22   IMPRESSION:    Acute pancreatitis without evidence of abnormal enhancement.      US Abdomen Upper Quadrant Right 11.25.22   IMPRESSION:    Cholelithiasis without evidence of cholecystitis. The common bile duct is   normal insize.    Hepatic steatosis.  CBD 3mm   (V5) oriented

## 2023-10-25 NOTE — ED ADULT NURSE NOTE - NS_NURSE_DISC_TEACHING_YN_ED_ALL_ED
Ftsg Text: The defect edges were debeveled with a #15 scalpel blade.  Given the location of the defect, shape of the defect and the proximity to free margins a full thickness skin graft was deemed most appropriate.  Using a sterile surgical marker, the primary defect shape was transferred to the donor site. The area thus outlined was incised deep to adipose tissue with a #15 scalpel blade.  The harvested graft was then trimmed of adipose tissue until only dermis and epidermis was left.  The skin margins of the secondary defect were undermined to an appropriate distance in all directions utilizing iris scissors.  The secondary defect was closed with interrupted buried subcutaneous sutures.  The skin edges were then re-apposed with running  sutures.  The skin graft was then placed in the primary defect and oriented appropriately. Yes

## 2024-01-04 ENCOUNTER — EMERGENCY (EMERGENCY)
Facility: HOSPITAL | Age: 69
LOS: 1 days | Discharge: HOME | End: 2024-01-04
Attending: STUDENT IN AN ORGANIZED HEALTH CARE EDUCATION/TRAINING PROGRAM | Admitting: STUDENT IN AN ORGANIZED HEALTH CARE EDUCATION/TRAINING PROGRAM
Payer: MEDICARE

## 2024-01-04 VITALS
SYSTOLIC BLOOD PRESSURE: 169 MMHG | HEART RATE: 86 BPM | HEIGHT: 67 IN | TEMPERATURE: 97 F | OXYGEN SATURATION: 95 % | DIASTOLIC BLOOD PRESSURE: 127 MMHG | RESPIRATION RATE: 20 BRPM | WEIGHT: 154.98 LBS

## 2024-01-04 VITALS
OXYGEN SATURATION: 96 % | TEMPERATURE: 98 F | DIASTOLIC BLOOD PRESSURE: 89 MMHG | SYSTOLIC BLOOD PRESSURE: 158 MMHG | HEART RATE: 70 BPM | RESPIRATION RATE: 20 BRPM

## 2024-01-04 DIAGNOSIS — I10 ESSENTIAL (PRIMARY) HYPERTENSION: ICD-10-CM

## 2024-01-04 DIAGNOSIS — E78.5 HYPERLIPIDEMIA, UNSPECIFIED: ICD-10-CM

## 2024-01-04 DIAGNOSIS — R42 DIZZINESS AND GIDDINESS: ICD-10-CM

## 2024-01-04 DIAGNOSIS — M79.89 OTHER SPECIFIED SOFT TISSUE DISORDERS: ICD-10-CM

## 2024-01-04 DIAGNOSIS — F25.9 SCHIZOAFFECTIVE DISORDER, UNSPECIFIED: ICD-10-CM

## 2024-01-04 LAB
ANION GAP SERPL CALC-SCNC: 9 MMOL/L — SIGNIFICANT CHANGE UP (ref 5–17)
ANION GAP SERPL CALC-SCNC: 9 MMOL/L — SIGNIFICANT CHANGE UP (ref 5–17)
BASOPHILS # BLD AUTO: 0.09 K/UL — SIGNIFICANT CHANGE UP (ref 0–0.2)
BASOPHILS # BLD AUTO: 0.09 K/UL — SIGNIFICANT CHANGE UP (ref 0–0.2)
BASOPHILS NFR BLD AUTO: 0.9 % — SIGNIFICANT CHANGE UP (ref 0–2)
BASOPHILS NFR BLD AUTO: 0.9 % — SIGNIFICANT CHANGE UP (ref 0–2)
BUN SERPL-MCNC: 23 MG/DL — SIGNIFICANT CHANGE UP (ref 7–23)
BUN SERPL-MCNC: 23 MG/DL — SIGNIFICANT CHANGE UP (ref 7–23)
CALCIUM SERPL-MCNC: 9.7 MG/DL — SIGNIFICANT CHANGE UP (ref 8.4–10.5)
CALCIUM SERPL-MCNC: 9.7 MG/DL — SIGNIFICANT CHANGE UP (ref 8.4–10.5)
CHLORIDE SERPL-SCNC: 101 MMOL/L — SIGNIFICANT CHANGE UP (ref 96–108)
CHLORIDE SERPL-SCNC: 101 MMOL/L — SIGNIFICANT CHANGE UP (ref 96–108)
CO2 SERPL-SCNC: 29 MMOL/L — SIGNIFICANT CHANGE UP (ref 22–31)
CO2 SERPL-SCNC: 29 MMOL/L — SIGNIFICANT CHANGE UP (ref 22–31)
CREAT SERPL-MCNC: 1.32 MG/DL — HIGH (ref 0.5–1.3)
CREAT SERPL-MCNC: 1.32 MG/DL — HIGH (ref 0.5–1.3)
EGFR: 59 ML/MIN/1.73M2 — LOW
EGFR: 59 ML/MIN/1.73M2 — LOW
EOSINOPHIL # BLD AUTO: 0.36 K/UL — SIGNIFICANT CHANGE UP (ref 0–0.5)
EOSINOPHIL # BLD AUTO: 0.36 K/UL — SIGNIFICANT CHANGE UP (ref 0–0.5)
EOSINOPHIL NFR BLD AUTO: 3.7 % — SIGNIFICANT CHANGE UP (ref 0–6)
EOSINOPHIL NFR BLD AUTO: 3.7 % — SIGNIFICANT CHANGE UP (ref 0–6)
GLUCOSE SERPL-MCNC: 104 MG/DL — HIGH (ref 70–99)
GLUCOSE SERPL-MCNC: 104 MG/DL — HIGH (ref 70–99)
HCT VFR BLD CALC: 47.5 % — SIGNIFICANT CHANGE UP (ref 39–50)
HCT VFR BLD CALC: 47.5 % — SIGNIFICANT CHANGE UP (ref 39–50)
HGB BLD-MCNC: 16.3 G/DL — SIGNIFICANT CHANGE UP (ref 13–17)
HGB BLD-MCNC: 16.3 G/DL — SIGNIFICANT CHANGE UP (ref 13–17)
IMM GRANULOCYTES NFR BLD AUTO: 0.2 % — SIGNIFICANT CHANGE UP (ref 0–0.9)
IMM GRANULOCYTES NFR BLD AUTO: 0.2 % — SIGNIFICANT CHANGE UP (ref 0–0.9)
LYMPHOCYTES # BLD AUTO: 3.28 K/UL — SIGNIFICANT CHANGE UP (ref 1–3.3)
LYMPHOCYTES # BLD AUTO: 3.28 K/UL — SIGNIFICANT CHANGE UP (ref 1–3.3)
LYMPHOCYTES # BLD AUTO: 33.5 % — SIGNIFICANT CHANGE UP (ref 13–44)
LYMPHOCYTES # BLD AUTO: 33.5 % — SIGNIFICANT CHANGE UP (ref 13–44)
MCHC RBC-ENTMCNC: 30.8 PG — SIGNIFICANT CHANGE UP (ref 27–34)
MCHC RBC-ENTMCNC: 30.8 PG — SIGNIFICANT CHANGE UP (ref 27–34)
MCHC RBC-ENTMCNC: 34.3 GM/DL — SIGNIFICANT CHANGE UP (ref 32–36)
MCHC RBC-ENTMCNC: 34.3 GM/DL — SIGNIFICANT CHANGE UP (ref 32–36)
MCV RBC AUTO: 89.8 FL — SIGNIFICANT CHANGE UP (ref 80–100)
MCV RBC AUTO: 89.8 FL — SIGNIFICANT CHANGE UP (ref 80–100)
MONOCYTES # BLD AUTO: 1.08 K/UL — HIGH (ref 0–0.9)
MONOCYTES # BLD AUTO: 1.08 K/UL — HIGH (ref 0–0.9)
MONOCYTES NFR BLD AUTO: 11 % — SIGNIFICANT CHANGE UP (ref 2–14)
MONOCYTES NFR BLD AUTO: 11 % — SIGNIFICANT CHANGE UP (ref 2–14)
NEUTROPHILS # BLD AUTO: 4.97 K/UL — SIGNIFICANT CHANGE UP (ref 1.8–7.4)
NEUTROPHILS # BLD AUTO: 4.97 K/UL — SIGNIFICANT CHANGE UP (ref 1.8–7.4)
NEUTROPHILS NFR BLD AUTO: 50.7 % — SIGNIFICANT CHANGE UP (ref 43–77)
NEUTROPHILS NFR BLD AUTO: 50.7 % — SIGNIFICANT CHANGE UP (ref 43–77)
NRBC # BLD: 0 /100 WBCS — SIGNIFICANT CHANGE UP (ref 0–0)
NRBC # BLD: 0 /100 WBCS — SIGNIFICANT CHANGE UP (ref 0–0)
PLATELET # BLD AUTO: 347 K/UL — SIGNIFICANT CHANGE UP (ref 150–400)
PLATELET # BLD AUTO: 347 K/UL — SIGNIFICANT CHANGE UP (ref 150–400)
POTASSIUM SERPL-MCNC: 4.2 MMOL/L — SIGNIFICANT CHANGE UP (ref 3.5–5.3)
POTASSIUM SERPL-MCNC: 4.2 MMOL/L — SIGNIFICANT CHANGE UP (ref 3.5–5.3)
POTASSIUM SERPL-SCNC: 4.2 MMOL/L — SIGNIFICANT CHANGE UP (ref 3.5–5.3)
POTASSIUM SERPL-SCNC: 4.2 MMOL/L — SIGNIFICANT CHANGE UP (ref 3.5–5.3)
RBC # BLD: 5.29 M/UL — SIGNIFICANT CHANGE UP (ref 4.2–5.8)
RBC # BLD: 5.29 M/UL — SIGNIFICANT CHANGE UP (ref 4.2–5.8)
RBC # FLD: 13 % — SIGNIFICANT CHANGE UP (ref 10.3–14.5)
RBC # FLD: 13 % — SIGNIFICANT CHANGE UP (ref 10.3–14.5)
SODIUM SERPL-SCNC: 139 MMOL/L — SIGNIFICANT CHANGE UP (ref 135–145)
SODIUM SERPL-SCNC: 139 MMOL/L — SIGNIFICANT CHANGE UP (ref 135–145)
WBC # BLD: 9.8 K/UL — SIGNIFICANT CHANGE UP (ref 3.8–10.5)
WBC # BLD: 9.8 K/UL — SIGNIFICANT CHANGE UP (ref 3.8–10.5)
WBC # FLD AUTO: 9.8 K/UL — SIGNIFICANT CHANGE UP (ref 3.8–10.5)
WBC # FLD AUTO: 9.8 K/UL — SIGNIFICANT CHANGE UP (ref 3.8–10.5)

## 2024-01-04 PROCEDURE — 93010 ELECTROCARDIOGRAM REPORT: CPT

## 2024-01-04 PROCEDURE — 82962 GLUCOSE BLOOD TEST: CPT

## 2024-01-04 PROCEDURE — 36415 COLL VENOUS BLD VENIPUNCTURE: CPT

## 2024-01-04 PROCEDURE — 80048 BASIC METABOLIC PNL TOTAL CA: CPT

## 2024-01-04 PROCEDURE — 85025 COMPLETE CBC W/AUTO DIFF WBC: CPT

## 2024-01-04 PROCEDURE — 99284 EMERGENCY DEPT VISIT MOD MDM: CPT

## 2024-01-04 PROCEDURE — 99283 EMERGENCY DEPT VISIT LOW MDM: CPT | Mod: 25

## 2024-01-04 PROCEDURE — 93005 ELECTROCARDIOGRAM TRACING: CPT

## 2024-01-04 RX ORDER — AMLODIPINE BESYLATE 2.5 MG/1
1 TABLET ORAL
Qty: 30 | Refills: 0
Start: 2024-01-04 | End: 2024-02-02

## 2024-01-04 RX ORDER — AMLODIPINE BESYLATE 2.5 MG/1
5 TABLET ORAL ONCE
Refills: 0 | Status: COMPLETED | OUTPATIENT
Start: 2024-01-04 | End: 2024-01-04

## 2024-01-04 RX ORDER — SODIUM CHLORIDE 9 MG/ML
1000 INJECTION INTRAMUSCULAR; INTRAVENOUS; SUBCUTANEOUS ONCE
Refills: 0 | Status: COMPLETED | OUTPATIENT
Start: 2024-01-04 | End: 2024-01-04

## 2024-01-04 RX ADMIN — AMLODIPINE BESYLATE 5 MILLIGRAM(S): 2.5 TABLET ORAL at 16:19

## 2024-01-04 RX ADMIN — SODIUM CHLORIDE 1000 MILLILITER(S): 9 INJECTION INTRAMUSCULAR; INTRAVENOUS; SUBCUTANEOUS at 16:18

## 2024-01-04 NOTE — ED PROVIDER NOTE - ATTENDING APP SHARED VISIT CONTRIBUTION OF CARE
68 year old presenting with htn at assisted living facility, endorsing years of le swelling, some intermittent lightheadedness, exam nonfocal, will check ekg, basic labs including cr, reassess. 68 year old presenting with htn at assisted living facility, endorsing years of le swelling, some intermittent lightheadedness, exam nonfocal, will check ekg, basic labs including cr, reassessment

## 2024-01-04 NOTE — ED ADULT TRIAGE NOTE - CHIEF COMPLAINT QUOTE
pt coming from assisted living facility. states the nurses took his BP today and it was elevated, no hx of HTN. also c/o swollen feet x 2 years. endorses dizziness x 1 month.

## 2024-01-04 NOTE — ED ADULT NURSE NOTE - NSFALLUNIVINTERV_ED_ALL_ED
Bed/Stretcher in lowest position, wheels locked, appropriate side rails in place/Call bell, personal items and telephone in reach/Instruct patient to call for assistance before getting out of bed/chair/stretcher/Non-slip footwear applied when patient is off stretcher/[place] to call system/Physically safe environment - no spills, clutter or unnecessary equipment/Purposeful proactive rounding/Room/bathroom lighting operational, light cord in reach Bed/Stretcher in lowest position, wheels locked, appropriate side rails in place/Call bell, personal items and telephone in reach/Instruct patient to call for assistance before getting out of bed/chair/stretcher/Non-slip footwear applied when patient is off stretcher/[place] to call system/Physically safe environment - no spills, clutter or unnecessary equipment/Purposeful proactive rounding/Room/bathroom lighting operational, light cord in reach

## 2024-01-04 NOTE — ED ADULT NURSE NOTE - CCCP TRG CHIEF CMPLNT
Taltz Pregnancy And Lactation Text: The risk during pregnancy and breastfeeding is uncertain with this medication. hypertension

## 2024-01-04 NOTE — ED PROVIDER NOTE - NSFOLLOWUPINSTRUCTIONS_ED_ALL_ED_FT
Hypertension, Adult  Hypertension is another name for high blood pressure. High blood pressure forces your heart to work harder to pump blood. This can cause problems over time.    There are two numbers in a blood pressure reading. There is a top number (systolic) over a bottom number (diastolic). It is best to have a blood pressure that is below 120/80.    What are the causes?  The cause of this condition is not known. Some other conditions can lead to high blood pressure.    What increases the risk?  Some lifestyle factors can make you more likely to develop high blood pressure:  Smoking.  Not getting enough exercise or physical activity.  Being overweight.  Having too much fat, sugar, calories, or salt (sodium) in your diet.  Drinking too much alcohol.  Other risk factors include:  Having any of these conditions:  Heart disease.  Diabetes.  High cholesterol.  Kidney disease.  Obstructive sleep apnea.  Having a family history of high blood pressure and high cholesterol.  Age. The risk increases with age.  Stress.  What are the signs or symptoms?  High blood pressure may not cause symptoms. Very high blood pressure (hypertensive crisis) may cause:  Headache.  Fast or uneven heartbeats (palpitations).  Shortness of breath.  Nosebleed.  Vomiting or feeling like you may vomit (nauseous).  Changes in how you see.  Very bad chest pain.  Feeling dizzy.  Seizures.  How is this treated?  This condition is treated by making healthy lifestyle changes, such as:  Eating healthy foods.  Exercising more.  Drinking less alcohol.  Your doctor may prescribe medicine if lifestyle changes do not help enough and if:  Your top number is above 130.  Your bottom number is above 80.  Your personal target blood pressure may vary.  Follow these instructions at home:  Eating and drinking    A plate with examples of foods in a healthy diet.  If told, follow the DASH eating plan. To follow this plan:  Fill one half of your plate at each meal with fruits and vegetables.  Fill one fourth of your plate at each meal with whole grains. Whole grains include whole-wheat pasta, brown rice, and whole-grain bread.  Eat or drink low-fat dairy products, such as skim milk or low-fat yogurt.  Fill one fourth of your plate at each meal with low-fat (lean) proteins. Low-fat proteins include fish, chicken without skin, eggs, beans, and tofu.  Avoid fatty meat, cured and processed meat, or chicken with skin.  Avoid pre-made or processed food.  Limit the amount of salt in your diet to less than 1,500 mg each day.  Do not drink alcohol if:  Your doctor tells you not to drink.  You are pregnant, may be pregnant, or are planning to become pregnant.  If you drink alcohol:  Limit how much you have to:  0–1 drink a day for women.  0–2 drinks a day for men.  Know how much alcohol is in your drink. In the [place] one drink equals one 12 oz bottle of beer (355 mL), one 5 oz glass of wine (148 mL), or one 1½ oz glass of hard liquor (44 mL).  Lifestyle    A blood pressure monitor and cuff.   Work with your doctor to stay at a healthy weight or to lose weight. Ask your doctor what the best weight is for you.  Get at least 30 minutes of exercise that causes your heart to beat faster (aerobic exercise) most days of the week. This may include walking, swimming, or biking.  Get at least 30 minutes of exercise that strengthens your muscles (resistance exercise) at least 3 days a week. This may include lifting weights or doing Pilates.  Do not smoke or use any products that contain nicotine or tobacco. If you need help quitting, ask your doctor.  Check your blood pressure at home as told by your doctor.  Keep all follow-up visits.  Medicines    Take over-the-counter and prescription medicines only as told by your doctor. Follow directions carefully.  Do not skip doses of blood pressure medicine. The medicine does not work as well if you skip doses. Skipping doses also puts you at risk for problems.  Ask your doctor about side effects or reactions to medicines that you should watch for.  Contact a doctor if:  You think you are having a reaction to the medicine you are taking.  You have headaches that keep coming back.  You feel dizzy.  You have swelling in your ankles.  You have trouble with your vision.  Get help right away if:  You get a very bad headache.  You start to feel mixed up (confused).  You feel weak or numb.  You feel faint.  You have very bad pain in your:  Chest.  Belly (abdomen).  You vomit more than once.  You have trouble breathing.  These symptoms may be an emergency. Get help right away. Call 911.  Do not wait to see if the symptoms will go away.  Do not drive yourself to the hospital.  Summary  Hypertension is another name for high blood pressure.  High blood pressure forces your heart to work harder to pump blood.  For most people, a normal blood pressure is less than 120/80.  Making healthy choices can help lower blood pressure. If your blood pressure does not get lower with healthy choices, you may need to take medicine.  This information is not intended to replace advice given to you by your health care provider. Make sure you discuss any questions you have with your health care provider.

## 2024-01-04 NOTE — ED PROVIDER NOTE - CPE EDP PSYCH NORM
[facility] VISIT NOTE    1000 hrs  Pt arrived at [facility] ambulatory and in no distress for Bendamustine (Bendeka) plus Rituximab C5D2. Assessment completed, pt offered no new concerns at this time. Pt presents with accessed port; Positive blood return noted. Medications received:  Dexamethasone IVP  Bendamustine IVP    Tolerated treatment well, no adverse reaction noted. Port de-accessed and flushed per protocol. Positive blood return noted. Patient Vitals for the past 12 hrs:   Temp Pulse Resp BP SpO2   04/14/17 1102 97 °F (36.1 °C) 60 16 119/73 96 %   04/14/17 0958 97.3 °F (36.3 °C) 64 16 118/74 96 %       1105 hrs   D/C'd from [facility] ambulatory and in no distress accompanied by spouse.  Next appointment is 05/10/17 @ 1pm. normal...

## 2024-01-04 NOTE — ED PROVIDER NOTE - CLINICAL SUMMARY MEDICAL DECISION MAKING FREE TEXT BOX
69 y/o m hx schizoaffective d/o, HLD presents c/o elevated BP today, having intermittent lightheadedness and feet swelling for the past few weeks.  Pt asymptomatic in ED, /112.  Labs unremarkable, given a dose of amlodipine 5mg and will rx, stable for d/c to f/u with pmd. 67 y/o m hx schizoaffective d/o, HLD presents c/o elevated BP today, having intermittent lightheadedness and feet swelling for the past few weeks.  Pt asymptomatic in ED, /112.  Labs unremarkable, given a dose of amlodipine 5mg and will rx, stable for d/c to f/u with pmd.

## 2024-01-04 NOTE — ED PROVIDER NOTE - OBJECTIVE STATEMENT
67 y/o m hx schizoaffective d/o, HLD presents c/o having mildly swollen feet over the past 2 weeks, also intermittently lightheaded when he stands up from a sitting position.  Pt stating his blood pressure was checked today at his living facility and was high so was sent to ED.  Pt denies CP, SOB, fever, chills, headache, visual changes, all other ROS negative.

## 2024-01-04 NOTE — ED PROVIDER NOTE - IV ALTEPLASE ADMIN OUTSIDE HIDDEN
show -DVT: sqh q8h  -Diet: NPO except tube feeds  -Dispo: pending clinical improvement  -GOC: Full code, MOLST in chart  -Will need to contact daughter (Marine Ponce [phone]) who may be HCP  -Will need to contact PMD Dr. Shwetha Yanez ([phone]) in AM

## 2024-01-04 NOTE — ED ADULT NURSE NOTE - CAS DISCH CONDITION
PT A&Ox4, respirations even and unlabored, skin color WDL warm and dry, pt is ambulatory with a steady gait. No acute distress observed.  Pt denies headache, dizziness, lightheadedness./Improved

## 2024-01-04 NOTE — ED ADULT NURSE NOTE - OBJECTIVE STATEMENT
69 y/o M BIBEMS from assisted living facility. Pt endorses hx of being prediabetic, denies hx of HTN. As per EMS pt endorsed intermittent dizziness and swelling of LE x2 years. Pt Hypertensive in assisted living facility and sent to ED. Denies beign on HTN medication. At this times, pt denies dizziness, lightheadedness, headache, chest pain, SOB, N/V/D, numbness, tingling, blurred vision. PT A&Ox4, respirations even and unlabored, skin color WDL warm and dry No acute distress observed.

## 2024-01-04 NOTE — ED PROVIDER NOTE - PATIENT PORTAL LINK FT
You can access the FollowMyHealth Patient Portal offered by [facility] by registering at the following website: [email or web address]. By joining [employer]’s FollowMyHealth portal, you will also be able to view your health information using other applications (apps) compatible with our system. You can access the FollowMyHealth Patient Portal offered by [facility] by registering at the following website: [email or web address]. By joining [employer]’s FollowMyHealth portal, you will also be able to view your health information using other applications (apps) compatible with our system.

## 2024-03-01 ENCOUNTER — APPOINTMENT (OUTPATIENT)
Dept: HEART AND VASCULAR | Facility: CLINIC | Age: 69
End: 2024-03-01

## 2024-03-13 ENCOUNTER — APPOINTMENT (OUTPATIENT)
Dept: HEART AND VASCULAR | Facility: CLINIC | Age: 69
End: 2024-03-13

## 2025-01-06 ENCOUNTER — EMERGENCY (EMERGENCY)
Facility: HOSPITAL | Age: 70
LOS: 1 days | Discharge: HOME | End: 2025-01-06
Attending: STUDENT IN AN ORGANIZED HEALTH CARE EDUCATION/TRAINING PROGRAM | Admitting: STUDENT IN AN ORGANIZED HEALTH CARE EDUCATION/TRAINING PROGRAM
Payer: MEDICARE

## 2025-01-06 VITALS
WEIGHT: 149.91 LBS | SYSTOLIC BLOOD PRESSURE: 107 MMHG | RESPIRATION RATE: 18 BRPM | TEMPERATURE: 98 F | HEART RATE: 93 BPM | OXYGEN SATURATION: 95 % | DIASTOLIC BLOOD PRESSURE: 71 MMHG | HEIGHT: 67 IN

## 2025-01-06 LAB
ANION GAP SERPL CALC-SCNC: 11 MMOL/L — SIGNIFICANT CHANGE UP (ref 5–17)
ANION GAP SERPL CALC-SCNC: 12 MMOL/L — SIGNIFICANT CHANGE UP (ref 5–17)
APPEARANCE UR: CLEAR — SIGNIFICANT CHANGE UP
BILIRUB UR-MCNC: NEGATIVE — SIGNIFICANT CHANGE UP
BUN SERPL-MCNC: 57 MG/DL — HIGH (ref 7–23)
BUN SERPL-MCNC: 57 MG/DL — HIGH (ref 7–23)
CALCIUM SERPL-MCNC: 8.9 MG/DL — SIGNIFICANT CHANGE UP (ref 8.4–10.5)
CALCIUM SERPL-MCNC: 9 MG/DL — SIGNIFICANT CHANGE UP (ref 8.4–10.5)
CHLORIDE SERPL-SCNC: 102 MMOL/L — SIGNIFICANT CHANGE UP (ref 96–108)
CHLORIDE SERPL-SCNC: 102 MMOL/L — SIGNIFICANT CHANGE UP (ref 96–108)
CO2 SERPL-SCNC: 20 MMOL/L — LOW (ref 22–31)
CO2 SERPL-SCNC: 20 MMOL/L — LOW (ref 22–31)
COLOR SPEC: YELLOW — SIGNIFICANT CHANGE UP
CREAT SERPL-MCNC: 1.58 MG/DL — HIGH (ref 0.5–1.3)
CREAT SERPL-MCNC: 1.6 MG/DL — HIGH (ref 0.5–1.3)
DIFF PNL FLD: NEGATIVE — SIGNIFICANT CHANGE UP
EGFR: 46 ML/MIN/1.73M2 — LOW
EGFR: 47 ML/MIN/1.73M2 — LOW
FLUAV AG NPH QL: SIGNIFICANT CHANGE UP
FLUBV AG NPH QL: SIGNIFICANT CHANGE UP
GLUCOSE SERPL-MCNC: 105 MG/DL — HIGH (ref 70–99)
GLUCOSE SERPL-MCNC: 127 MG/DL — HIGH (ref 70–99)
GLUCOSE UR QL: NEGATIVE MG/DL — SIGNIFICANT CHANGE UP
HCT VFR BLD CALC: 37.2 % — LOW (ref 39–50)
HGB BLD-MCNC: 13.2 G/DL — SIGNIFICANT CHANGE UP (ref 13–17)
KETONES UR-MCNC: NEGATIVE MG/DL — SIGNIFICANT CHANGE UP
LEUKOCYTE ESTERASE UR-ACNC: NEGATIVE — SIGNIFICANT CHANGE UP
MCHC RBC-ENTMCNC: 32.2 PG — SIGNIFICANT CHANGE UP (ref 27–34)
MCHC RBC-ENTMCNC: 35.5 G/DL — SIGNIFICANT CHANGE UP (ref 32–36)
MCV RBC AUTO: 90.7 FL — SIGNIFICANT CHANGE UP (ref 80–100)
NITRITE UR-MCNC: NEGATIVE — SIGNIFICANT CHANGE UP
NRBC # BLD: 0 /100 WBCS — SIGNIFICANT CHANGE UP (ref 0–0)
PH UR: 5.5 — SIGNIFICANT CHANGE UP (ref 5–8)
PLATELET # BLD AUTO: 341 K/UL — SIGNIFICANT CHANGE UP (ref 150–400)
POTASSIUM SERPL-MCNC: 4.1 MMOL/L — SIGNIFICANT CHANGE UP (ref 3.5–5.3)
POTASSIUM SERPL-MCNC: SIGNIFICANT CHANGE UP (ref 3.5–5.3)
POTASSIUM SERPL-SCNC: 4.1 MMOL/L — SIGNIFICANT CHANGE UP (ref 3.5–5.3)
POTASSIUM SERPL-SCNC: SIGNIFICANT CHANGE UP (ref 3.5–5.3)
PROT UR-MCNC: 30 MG/DL
RBC # BLD: 4.1 M/UL — LOW (ref 4.2–5.8)
RBC # FLD: 12.2 % — SIGNIFICANT CHANGE UP (ref 10.3–14.5)
RSV RNA NPH QL NAA+NON-PROBE: SIGNIFICANT CHANGE UP
SARS-COV-2 RNA SPEC QL NAA+PROBE: SIGNIFICANT CHANGE UP
SODIUM SERPL-SCNC: 133 MMOL/L — LOW (ref 135–145)
SODIUM SERPL-SCNC: 134 MMOL/L — LOW (ref 135–145)
SP GR SPEC: 1.01 — SIGNIFICANT CHANGE UP (ref 1–1.03)
TROPONIN T, HIGH SENSITIVITY RESULT: 33 NG/L — SIGNIFICANT CHANGE UP (ref 0–51)
TROPONIN T, HIGH SENSITIVITY RESULT: 34 NG/L — SIGNIFICANT CHANGE UP (ref 0–51)
UROBILINOGEN FLD QL: 0.2 MG/DL — SIGNIFICANT CHANGE UP (ref 0.2–1)
WBC # BLD: 10.74 K/UL — HIGH (ref 3.8–10.5)
WBC # FLD AUTO: 10.74 K/UL — HIGH (ref 3.8–10.5)

## 2025-01-06 PROCEDURE — 71045 X-RAY EXAM CHEST 1 VIEW: CPT | Mod: 26

## 2025-01-06 PROCEDURE — 85027 COMPLETE CBC AUTOMATED: CPT

## 2025-01-06 PROCEDURE — 87637 SARSCOV2&INF A&B&RSV AMP PRB: CPT

## 2025-01-06 PROCEDURE — 71045 X-RAY EXAM CHEST 1 VIEW: CPT

## 2025-01-06 PROCEDURE — 81001 URINALYSIS AUTO W/SCOPE: CPT

## 2025-01-06 PROCEDURE — 84484 ASSAY OF TROPONIN QUANT: CPT

## 2025-01-06 PROCEDURE — 93005 ELECTROCARDIOGRAM TRACING: CPT

## 2025-01-06 PROCEDURE — 36415 COLL VENOUS BLD VENIPUNCTURE: CPT

## 2025-01-06 PROCEDURE — 93010 ELECTROCARDIOGRAM REPORT: CPT

## 2025-01-06 PROCEDURE — 99285 EMERGENCY DEPT VISIT HI MDM: CPT | Mod: 25

## 2025-01-06 PROCEDURE — 99284 EMERGENCY DEPT VISIT MOD MDM: CPT

## 2025-01-06 PROCEDURE — 80048 BASIC METABOLIC PNL TOTAL CA: CPT

## 2025-01-06 RX ORDER — SODIUM CHLORIDE 9 MG/ML
1000 INJECTION, SOLUTION INTRAVENOUS ONCE
Refills: 0 | Status: COMPLETED | OUTPATIENT
Start: 2025-01-06 | End: 2025-01-06

## 2025-01-06 RX ORDER — SODIUM CHLORIDE 9 MG/ML
1000 INJECTION, SOLUTION INTRAMUSCULAR; INTRAVENOUS; SUBCUTANEOUS ONCE
Refills: 0 | Status: COMPLETED | OUTPATIENT
Start: 2025-01-06 | End: 2025-01-06

## 2025-01-06 RX ADMIN — SODIUM CHLORIDE 1000 MILLILITER(S): 9 INJECTION, SOLUTION INTRAVENOUS at 19:46

## 2025-01-06 RX ADMIN — SODIUM CHLORIDE 1000 MILLILITER(S): 9 INJECTION, SOLUTION INTRAMUSCULAR; INTRAVENOUS; SUBCUTANEOUS at 16:32

## 2025-01-06 NOTE — ED ADULT NURSE REASSESSMENT NOTE - NS ED NURSE REASSESS COMMENT FT1
Received report from LEVON Swenson. Received patient in stretcher. AOX4. Vital signs as noted in flowsheet. Patient denies chest pain, pain, discomfort, shortness of breath, difficulty breathing and any form of distress not noted. Patient pending repeat BP after completion of fluids. Fall risk precautions maintained. Purposeful proactive hourly rounding in progress.

## 2025-01-06 NOTE — ED PROVIDER NOTE - NSFOLLOWUPINSTRUCTIONS_ED_ALL_ED_FT
Please follow up with kidney doctor as soon as possible. Call the number below to make an appointment. It appears you are quite dehydrated. Please stay hydrated.

## 2025-01-06 NOTE — ED PROVIDER NOTE - OBJECTIVE STATEMENT
68 y/o m hx schizoaffective d/o, HLD here from assisted living facility for failure to thrive, nausea, decreased appetite x few weeks with 15 lb weight loss. Denies cough, fevers, chills, vomiting, sob, abd pain.LBM this morning.

## 2025-01-06 NOTE — ED ADULT TRIAGE NOTE - CHIEF COMPLAINT QUOTE
Pt presents to ED BIBA form assisted living here for intermittent SOB and failure to thrive. Pt A&Ox4, NAD. EKG in prog.

## 2025-01-06 NOTE — ED PROVIDER NOTE - CARE PROVIDER_API CALL
Yomaira [facility] [place]  [facility]  [address]  [place] [address]  Phone: [phone]  Fax: [phone]  Follow Up Time: Urgent

## 2025-01-06 NOTE — ED PROVIDER NOTE - CLINICAL SUMMARY MEDICAL DECISION MAKING FREE TEXT BOX
68 y/o m hx schizoaffective d/o, HLD here from assisted living facility for failure to thrive, nausea, decreased appetite x few weeks with 15 lb weight loss. Denies cough, fevers, chills, vomiting, sob, abd pain.LBM this morning.     diff dx: metabolic derangements, occult malignancy, viral illness, anemia - plan for labs, UA, CXR 70 y/o m hx schizoaffective d/o, HLD here from assisted living facility for failure to thrive, nausea, decreased appetite x few weeks with 15 lb weight loss. Denies cough, fevers, chills, vomiting, sob, abd pain.LBM this morning.     diff dx: metabolic derangements, occult malignancy, viral illness, anemia - plan for labs, UA, CXR.     Pt with SHIRA, likely from dehydration. Otherwise no acute abnormalities. Trop stable. No signs of infection. pt should follow up with renal. Acitretin Pregnancy And Lactation Text: This medication is Pregnancy Category X and should not be given to women who are pregnant or may become pregnant in the future. This medication is excreted in breast milk.

## 2025-01-06 NOTE — ED PROVIDER NOTE - PATIENT PORTAL LINK FT
You can access the FollowMyHealth Patient Portal offered by [facility] by registering at the following website: [email or web address]. By joining [employer]’s FollowMyHealth portal, you will also be able to view your health information using other applications (apps) compatible with our system.

## 2025-01-06 NOTE — ED ADULT NURSE NOTE - NSFALLUNIVINTERV_ED_ALL_ED
Bed/Stretcher in lowest position, wheels locked, appropriate side rails in place/Call bell, personal items and telephone in reach/Instruct patient to call for assistance before getting out of bed/chair/stretcher/Non-slip footwear applied when patient is off stretcher/[place] to call system/Physically safe environment - no spills, clutter or unnecessary equipment/Purposeful proactive rounding/Room/bathroom lighting operational, light cord in reach

## 2025-01-06 NOTE — ED ADULT TRIAGE NOTE - BMI (KG/M2)
Care Coordinator received email from Te at [facility] - they are still reviewing and obtaining quote for items - they will send to Care Coordinator when ready.     Merari Reyna RN, N  [facility]         23.5

## 2025-01-07 VITALS
RESPIRATION RATE: 18 BRPM | HEART RATE: 83 BPM | TEMPERATURE: 99 F | OXYGEN SATURATION: 96 % | SYSTOLIC BLOOD PRESSURE: 108 MMHG | DIASTOLIC BLOOD PRESSURE: 74 MMHG

## 2025-01-10 DIAGNOSIS — R11.0 NAUSEA: ICD-10-CM

## 2025-01-10 DIAGNOSIS — E78.5 HYPERLIPIDEMIA, UNSPECIFIED: ICD-10-CM

## 2025-01-10 DIAGNOSIS — N17.9 ACUTE KIDNEY FAILURE, UNSPECIFIED: ICD-10-CM

## 2025-01-10 DIAGNOSIS — F25.9 SCHIZOAFFECTIVE DISORDER, UNSPECIFIED: ICD-10-CM

## 2025-01-10 DIAGNOSIS — R63.4 ABNORMAL WEIGHT LOSS: ICD-10-CM

## 2025-01-10 DIAGNOSIS — R63.0 ANOREXIA: ICD-10-CM

## 2025-01-10 DIAGNOSIS — R62.7 ADULT FAILURE TO THRIVE: ICD-10-CM

## 2025-01-12 ENCOUNTER — INPATIENT (INPATIENT)
Facility: HOSPITAL | Age: 70
LOS: 11 days | Discharge: HOME | End: 2025-01-24
Attending: INTERNAL MEDICINE | Admitting: INTERNAL MEDICINE
Payer: MEDICARE

## 2025-01-12 VITALS
HEART RATE: 122 BPM | HEIGHT: 67 IN | TEMPERATURE: 97 F | SYSTOLIC BLOOD PRESSURE: 113 MMHG | OXYGEN SATURATION: 98 % | DIASTOLIC BLOOD PRESSURE: 77 MMHG | RESPIRATION RATE: 21 BRPM | WEIGHT: 130.07 LBS

## 2025-01-12 LAB
ALBUMIN SERPL ELPH-MCNC: 3.4 G/DL — SIGNIFICANT CHANGE UP (ref 3.4–5)
ALP SERPL-CCNC: 81 U/L — SIGNIFICANT CHANGE UP (ref 40–120)
ALT FLD-CCNC: 89 U/L — HIGH (ref 12–42)
ANION GAP SERPL CALC-SCNC: 14 MMOL/L — SIGNIFICANT CHANGE UP (ref 9–16)
APTT BLD: 39.4 SEC — HIGH (ref 24.5–35.6)
AST SERPL-CCNC: 109 U/L — HIGH (ref 15–37)
BASOPHILS # BLD AUTO: 0.03 K/UL — SIGNIFICANT CHANGE UP (ref 0–0.2)
BASOPHILS NFR BLD AUTO: 0.3 % — SIGNIFICANT CHANGE UP (ref 0–2)
BILIRUB SERPL-MCNC: 0.6 MG/DL — SIGNIFICANT CHANGE UP (ref 0.2–1.2)
BUN SERPL-MCNC: 25 MG/DL — HIGH (ref 7–23)
CALCIUM SERPL-MCNC: 8.7 MG/DL — SIGNIFICANT CHANGE UP (ref 8.5–10.5)
CHLORIDE SERPL-SCNC: 102 MMOL/L — SIGNIFICANT CHANGE UP (ref 96–108)
CO2 SERPL-SCNC: 23 MMOL/L — SIGNIFICANT CHANGE UP (ref 22–31)
CREAT SERPL-MCNC: 2.24 MG/DL — HIGH (ref 0.5–1.3)
EGFR: 31 ML/MIN/1.73M2 — LOW
EOSINOPHIL # BLD AUTO: 0 K/UL — SIGNIFICANT CHANGE UP (ref 0–0.5)
EOSINOPHIL NFR BLD AUTO: 0 % — SIGNIFICANT CHANGE UP (ref 0–6)
FLUAV AG NPH QL: DETECTED
FLUBV AG NPH QL: SIGNIFICANT CHANGE UP
GLUCOSE SERPL-MCNC: 90 MG/DL — SIGNIFICANT CHANGE UP (ref 70–99)
HCT VFR BLD CALC: 39.2 % — SIGNIFICANT CHANGE UP (ref 39–50)
HGB BLD-MCNC: 13.1 G/DL — SIGNIFICANT CHANGE UP (ref 13–17)
IMM GRANULOCYTES NFR BLD AUTO: 0.6 % — SIGNIFICANT CHANGE UP (ref 0–0.9)
INR BLD: 1.24 — HIGH (ref 0.85–1.16)
LACTATE BLDV-MCNC: 1.7 MMOL/L — SIGNIFICANT CHANGE UP (ref 0.5–2)
LACTATE BLDV-MCNC: 4.6 MMOL/L — CRITICAL HIGH (ref 0.5–2)
LYMPHOCYTES # BLD AUTO: 0.92 K/UL — LOW (ref 1–3.3)
LYMPHOCYTES # BLD AUTO: 9.6 % — LOW (ref 13–44)
MAGNESIUM SERPL-MCNC: 1.5 MG/DL — LOW (ref 1.6–2.6)
MCHC RBC-ENTMCNC: 32.1 PG — SIGNIFICANT CHANGE UP (ref 27–34)
MCHC RBC-ENTMCNC: 33.4 G/DL — SIGNIFICANT CHANGE UP (ref 32–36)
MCV RBC AUTO: 96.1 FL — SIGNIFICANT CHANGE UP (ref 80–100)
MONOCYTES # BLD AUTO: 0.88 K/UL — SIGNIFICANT CHANGE UP (ref 0–0.9)
MONOCYTES NFR BLD AUTO: 9.2 % — SIGNIFICANT CHANGE UP (ref 2–14)
NEUTROPHILS # BLD AUTO: 7.66 K/UL — HIGH (ref 1.8–7.4)
NEUTROPHILS NFR BLD AUTO: 80.3 % — HIGH (ref 43–77)
NRBC # BLD: 0 /100 WBCS — SIGNIFICANT CHANGE UP (ref 0–0)
NRBC BLD-RTO: 0 /100 WBCS — SIGNIFICANT CHANGE UP (ref 0–0)
NT-PROBNP SERPL-SCNC: 3520 PG/ML — HIGH
PCO2 BLDV: 43 MMHG — SIGNIFICANT CHANGE UP (ref 42–55)
PH BLDV: 7.33 — SIGNIFICANT CHANGE UP (ref 7.32–7.43)
PLATELET # BLD AUTO: 262 K/UL — SIGNIFICANT CHANGE UP (ref 150–400)
PO2 BLDV: <35 MMHG — LOW (ref 25–45)
POTASSIUM SERPL-MCNC: 4.5 MMOL/L — SIGNIFICANT CHANGE UP (ref 3.5–5.3)
POTASSIUM SERPL-SCNC: 4.5 MMOL/L — SIGNIFICANT CHANGE UP (ref 3.5–5.3)
PROT SERPL-MCNC: 7 G/DL — SIGNIFICANT CHANGE UP (ref 6.4–8.2)
PROTHROM AB SERPL-ACNC: 14.3 SEC — HIGH (ref 9.9–13.4)
RBC # BLD: 4.08 M/UL — LOW (ref 4.2–5.8)
RBC # FLD: 13.4 % — SIGNIFICANT CHANGE UP (ref 10.3–14.5)
RSV RNA NPH QL NAA+NON-PROBE: SIGNIFICANT CHANGE UP
SAO2 % BLDV: 19.5 % — LOW (ref 67–88)
SARS-COV-2 RNA SPEC QL NAA+PROBE: SIGNIFICANT CHANGE UP
SODIUM SERPL-SCNC: 139 MMOL/L — SIGNIFICANT CHANGE UP (ref 132–145)
TROPONIN I, HIGH SENSITIVITY RESULT: 106.3 NG/L — HIGH
TROPONIN I, HIGH SENSITIVITY RESULT: 89.7 NG/L — HIGH
WBC # BLD: 9.55 K/UL — SIGNIFICANT CHANGE UP (ref 3.8–10.5)
WBC # FLD AUTO: 9.55 K/UL — SIGNIFICANT CHANGE UP (ref 3.8–10.5)

## 2025-01-12 PROCEDURE — 99291 CRITICAL CARE FIRST HOUR: CPT

## 2025-01-12 PROCEDURE — 71045 X-RAY EXAM CHEST 1 VIEW: CPT | Mod: 26

## 2025-01-12 PROCEDURE — 71275 CT ANGIOGRAPHY CHEST: CPT | Mod: 26

## 2025-01-12 RX ORDER — OSELTAMIVIR PHOSPHATE 75 MG/1
30 CAPSULE ORAL ONCE
Refills: 0 | Status: COMPLETED | OUTPATIENT
Start: 2025-01-12 | End: 2025-01-12

## 2025-01-12 RX ORDER — NOREPINEPHRINE BITARTRATE 1 MG/ML
0.05 INJECTION, SOLUTION, CONCENTRATE INTRAVENOUS
Qty: 8 | Refills: 0 | Status: DISCONTINUED | OUTPATIENT
Start: 2025-01-12 | End: 2025-01-14

## 2025-01-12 RX ORDER — SODIUM CHLORIDE 9 G/ML
1000 INJECTION, SOLUTION INTRAVENOUS ONCE
Refills: 0 | Status: COMPLETED | OUTPATIENT
Start: 2025-01-12 | End: 2025-01-12

## 2025-01-12 RX ORDER — AZITHROMYCIN DIHYDRATE 500 MG/1
500 TABLET, FILM COATED ORAL ONCE
Refills: 0 | Status: COMPLETED | OUTPATIENT
Start: 2025-01-12 | End: 2025-01-12

## 2025-01-12 RX ORDER — IPRATROPIUM BROMIDE AND ALBUTEROL SULFATE .5; 2.5 MG/3ML; MG/3ML
3 SOLUTION RESPIRATORY (INHALATION) ONCE
Refills: 0 | Status: COMPLETED | OUTPATIENT
Start: 2025-01-12 | End: 2025-01-12

## 2025-01-12 RX ORDER — NOREPINEPHRINE BITARTRATE 1 MG/ML
0.05 INJECTION, SOLUTION, CONCENTRATE INTRAVENOUS
Qty: 8 | Refills: 0 | Status: DISCONTINUED | OUTPATIENT
Start: 2025-01-12 | End: 2025-01-12

## 2025-01-12 RX ORDER — CEFTRIAXONE 250 MG/1
1000 INJECTION, POWDER, FOR SOLUTION INTRAMUSCULAR; INTRAVENOUS ONCE
Refills: 0 | Status: COMPLETED | OUTPATIENT
Start: 2025-01-12 | End: 2025-01-12

## 2025-01-12 RX ORDER — MAGNESIUM SULFATE 0.8 MEQ/ML
2 AMPUL (ML) INJECTION ONCE
Refills: 0 | Status: COMPLETED | OUTPATIENT
Start: 2025-01-12 | End: 2025-01-12

## 2025-01-12 RX ORDER — ACETAMINOPHEN 160 MG/5ML
975 SUSPENSION ORAL ONCE
Refills: 0 | Status: COMPLETED | OUTPATIENT
Start: 2025-01-12 | End: 2025-01-12

## 2025-01-12 RX ADMIN — IPRATROPIUM BROMIDE AND ALBUTEROL SULFATE 3 MILLILITER(S): .5; 2.5 SOLUTION RESPIRATORY (INHALATION) at 17:29

## 2025-01-12 RX ADMIN — NOREPINEPHRINE BITARTRATE 5.53 MICROGRAM(S)/KG/MIN: 1 INJECTION, SOLUTION, CONCENTRATE INTRAVENOUS at 23:25

## 2025-01-12 RX ADMIN — CEFTRIAXONE 100 MILLIGRAM(S): 250 INJECTION, POWDER, FOR SOLUTION INTRAMUSCULAR; INTRAVENOUS at 18:07

## 2025-01-12 RX ADMIN — SODIUM CHLORIDE 1000 MILLILITER(S): 9 INJECTION, SOLUTION INTRAVENOUS at 21:57

## 2025-01-12 RX ADMIN — SODIUM CHLORIDE 1000 MILLILITER(S): 9 INJECTION, SOLUTION INTRAVENOUS at 20:52

## 2025-01-12 RX ADMIN — OSELTAMIVIR PHOSPHATE 30 MILLIGRAM(S): 75 CAPSULE ORAL at 19:33

## 2025-01-12 RX ADMIN — Medication 25 GRAM(S): at 18:48

## 2025-01-12 RX ADMIN — SODIUM CHLORIDE 1000 MILLILITER(S): 9 INJECTION, SOLUTION INTRAVENOUS at 18:07

## 2025-01-12 RX ADMIN — NOREPINEPHRINE BITARTRATE 5.53 MICROGRAM(S)/KG/MIN: 1 INJECTION, SOLUTION, CONCENTRATE INTRAVENOUS at 23:34

## 2025-01-12 RX ADMIN — ACETAMINOPHEN 975 MILLIGRAM(S): 160 SUSPENSION ORAL at 18:08

## 2025-01-12 RX ADMIN — AZITHROMYCIN DIHYDRATE 255 MILLIGRAM(S): 500 TABLET, FILM COATED ORAL at 18:47

## 2025-01-12 NOTE — ED ADULT TRIAGE NOTE - CHIEF COMPLAINT QUOTE
Pt BIBEMS for EMS from shelter for eval of pw SOB,weakness,inabilty to ambulate tremor. Pt found to have RA sat of 90 on arrival, placed on NC w/ good response. On arrival pr mildly tremulousness. Afebrile. Denies daily etoh use.

## 2025-01-12 NOTE — ED PROVIDER NOTE - CARE PLAN
Principal Discharge DX:	Influenza A  Secondary Diagnosis:	Hypotension  Secondary Diagnosis:	Hypoxia  Secondary Diagnosis:	SHIRA (acute kidney injury)  Secondary Diagnosis:	Elevated troponin   1

## 2025-01-12 NOTE — ED PROVIDER NOTE - PRESENTATION SUGGESTIVE OF:
<Inderjit Stacy - Last Filed: 06/03/19 07:07>





PD HPI ALTERED MENTAL STATUS





- Stated complaint


Stated Complaint: INGESTION





- Chief complaint


Chief Complaint: Neuro





- History obtained from


History obtained from: Patient, Family, EMS





- History of Present Illness


Timing - onset: Today


Timing - duration: Minutes


Timing - details: Abrupt onset, Still present


Quality / character: Agitated, Hallucinating


Associated symptoms: No: Fever, Headache, Stiff neck, Dyspnea, Cough, NVD, Ur

inary sx, General weakness, Focal weakness, Seizure activity, Syncope


Contributing factors: Intoxicated, Substance abuse


Basline status: Alert and oriented X 3, Ambulatory, Independent


Similar symptoms before: Has not had sx before


Recently seen: Not recently seen





- Additional information


Additional information: 





30-year-old female is extremely agitated and poor historian secondary to her 

general level of agitation.  She is able to indicate that she took something 

that must of been some bad drugs and she is not used to feeling this way at all.

 There was some cocaine found on her person and she believes there must be 

something else in with it.  She is able to tell her sister later that she thinks

this may be some ecstasy.  She feels that she is going to die and she is telling

her mother and her sister that she is sorry that she will never be the same and 

that she is not able to control her feelings.





Her family members indicate this is not her normal mentation.





Review of Systems


Constitutional: denies: Fever


Eyes: denies: Decreased vision


Ears: denies: Ear pain


Nose: denies: Congestion


Throat: denies: Sore throat


Cardiac: denies: Chest pain / pressure, Palpitations


Respiratory: denies: Dyspnea, Cough


GI: denies: Abdominal Pain, Nausea, Vomiting


: denies: Dysuria


Skin: denies: Abrasion (s)


Musculoskeletal: denies: Neck pain, Back pain, Extremity pain


Neurologic: reports: Altered mental status.  denies: Generalized weakness, Focal

weakness, Numbness


Psychiatric: reports: Hallucinations, Anxiety.  denies: Depressed, Suicidal, 

Homicidal





PD PAST MEDICAL HISTORY





- Past Medical History


Cardiovascular: None


Respiratory: None


Endocrine/Autoimmune: None


GI: None


GYN: None


: None


HEENT: None


Psych: None


Musculoskeletal: None


Derm: None





- Past Surgical History


Past Surgical History: No


General: Other





- Present Medications


Home Medications: 


                                Ambulatory Orders











 Medication  Instructions  Recorded  Confirmed


 


No Known Home Medications  06/03/19 06/03/19














- Allergies


Allergies/Adverse Reactions: 


                                    Allergies











Allergy/AdvReac Type Severity Reaction Status Date / Time


 


No Known Drug Allergies Allergy   Verified 09/15/17 15:56














- Social History


Does the pt smoke?: Yes


Smoking Status: Current every day smoker


Does the pt drink ETOH?: Yes


Does the pt have substance abuse?: Yes


Substance Use and Type: Cocaine/Crack





- Immunizations


Immunizations are current?: Yes





- POLST


Patient has POLST: No





PD ED PE NORMAL





- Vitals


Vital signs reviewed: Yes (tachy, tachypneic and hypertensive)





- General


General: Well developed/nourished, Other (The patient is aggitated, repeats she 

is sorry and she cannot control her movements. She is moving all ext and 

breathing rapidly. She is wide eyed and talking fast. )





- HEENT


HEENT: Atraumatic, PERRL, EOMI





- Neck


Neck: Supple, no meningeal sign, No bony TTP





- Cardiac


Cardiac: No murmur, Other (tachy to 130)





- Respiratory


Respiratory: Clear bilaterally, Other (tachypneic at rest )





- Abdomen


Abdomen: Soft, Non tender





- Back


Back: No CVA TTP, No spinal TTP





- Derm


Derm: Normal color, Warm and dry, No rash





- Extremities


Extremities: No deformity, No edema





- Neuro


Neuro: CNs 2-12 intact, No motor deficit, No sensory deficit, Normal speech


Eye Opening: Spontaneous


Motor: Obeys Commands


Verbal: Confused


GCS Score: 14





- Psych


Psych: Other (mood is aggitated and the affect is labile. )





PD MEDICAL DECISION MAKING





- ED course


Complexity details: reviewed old records, reviewed results, re-evaluated 

patient, considered differential, d/w patient, d/w family


ED course: 


30-year-old female acutely intoxicated presents initially with the potential for

cocaine toxicity and she is extremely agitated.  There is an indication for 

repeated doses of benzodiazepine and she is administered Ativan 2 mg IM and this

requires 3 separate doses and ordered for the patient to become less agitated 

enough to place a peripheral IV.  She is then administered the recommended 5 mg 

of Valium and we are about to give a second dose of Valium when she is asleep 

her heart rate reduces to the 70s and 80s.  Were able to get urine from the 

patient and her tox screen indicates cocaine, amphetamine, methamphetamine, 

cannabis and benzodiazepine. The patient is not ready for discharge at shift 

change and her care is turned over to Dr. Mane at shift change with 

expectation of the patient metabolizing the drugs given here as well as the 

meth, amphetamine and cocaine. 








Departure





- Departure


Disposition: 01 Home, Self Care


Clinical Impression: 


 Hypokalemia





Accidental drug overdose


Qualifiers:


 Encounter type: initial encounter Qualified Code(s): T50.901A - Poisoning by 

unspecified drugs, medicaments and biological substances, accidental 

(unintentional), initial encounter





Condition: Stable


Instructions:  ED Drug Abuse General, ED Overdose Accidental, ED Diet High 

Potassium


Follow-Up: 


[facility] [Provider Group]


Comments: 


You were given a dose of potassium here but should increase  your oral intake 

and have it rechecked by your doctor within a week. 


Discharge Date/Time: 06/03/19 12:04





<Ruthy Mane - Last Filed: 06/04/19 08:37>





Results





- Vitals


Vitals: 


                               Vital Signs - 24 hr











  06/03/19 06/03/19





  11:08 12:04


 


Temperature  1130 C H


 


Heart Rate 76 78


 


Respiratory 18 17





Rate  


 


Blood Pressure 117/90 H 117/90 H


 


O2 Saturation 100 100








                                     Oxygen











O2 Source                      Room air

















- Labs


Labs: 


                                Laboratory Tests











  06/03/19 06/03/19 06/03/19





  02:20 02:20 02:20


 


WBC  5.9  


 


RBC  4.37  


 


Hgb  13.5  


 


Hct  39.7  


 


MCV  90.9  


 


MCH  30.9  


 


MCHC  34.0  


 


RDW  14.3  


 


Plt Count  273  


 


MPV  7.4 L  


 


Neut # (Auto)  3.9  


 


Lymph # (Auto)  1.3 L  


 


Mono # (Auto)  0.6  


 


Eos # (Auto)  0.0  


 


Baso # (Auto)  0.0  


 


Absolute Nucleated RBC  0.01  


 


Nucleated RBC %  0.1  


 


Sodium   139 


 


Potassium   2.8 L 


 


Chloride   104 


 


Carbon Dioxide   19 L 


 


Anion Gap   16.0 H 


 


BUN   < 5 L 


 


Creatinine   0.9 


 


Estimated GFR (MDRD)   89 


 


Glucose   111 H 


 


Calcium   9.7 


 


Total Bilirubin   1.2 H 


 


AST   33 


 


ALT   27 


 


Alkaline Phosphatase   61 


 


Troponin I    < 0.04


 


Total Protein   8.1 


 


Albumin   4.6 


 


Globulin   3.5 


 


Albumin/Globulin Ratio   1.3 


 


Lipase   21 L 


 


Urine Color   


 


Urine Clarity   


 


Urine pH   


 


Ur Specific Gravity   


 


Urine Protein   


 


Urine Glucose (UA)   


 


Urine Ketones   


 


Urine Occult Blood   


 


Urine Nitrite   


 


Urine Bilirubin   


 


Urine Urobilinogen   


 


Ur Leukocyte Esterase   


 


Ur Microscopic Review   


 


Urine Culture Comments   


 


Urine HCG, Qual   


 


Urine Opiates Screen   


 


Ur Oxycodone Screen   


 


Urine Methadone Screen   


 


Ur Propoxyphene Screen   


 


Ur Barbiturates Screen   


 


Ur Tricyclics Screen   


 


Ur Phencyclidine Scrn   


 


Ur Amphetamine Screen   


 


U Methamphetamines Scrn   


 


U Benzodiazepines Scrn   


 


Urine Cocaine Screen   


 


U Cannabinoids Screen   


 


Ethyl Alcohol   < 5.0 














  06/03/19 06/03/19





  03:36 03:36


 


WBC  


 


RBC  


 


Hgb  


 


Hct  


 


MCV  


 


MCH  


 


MCHC  


 


RDW  


 


Plt Count  


 


MPV  


 


Neut # (Auto)  


 


Lymph # (Auto)  


 


Mono # (Auto)  


 


Eos # (Auto)  


 


Baso # (Auto)  


 


Absolute Nucleated RBC  


 


Nucleated RBC %  


 


Sodium  


 


Potassium  


 


Chloride  


 


Carbon Dioxide  


 


Anion Gap  


 


BUN  


 


Creatinine  


 


Estimated GFR (MDRD)  


 


Glucose  


 


Calcium  


 


Total Bilirubin  


 


AST  


 


ALT  


 


Alkaline Phosphatase  


 


Troponin I  


 


Total Protein  


 


Albumin  


 


Globulin  


 


Albumin/Globulin Ratio  


 


Lipase  


 


Urine Color   YELLOW


 


Urine Clarity   CLEAR


 


Urine pH   7.5


 


Ur Specific Gravity   1.010


 


Urine Protein   NEGATIVE


 


Urine Glucose (UA)   NEGATIVE


 


Urine Ketones   15 H


 


Urine Occult Blood   TRACE-INTA


 


Urine Nitrite   NEGATIVE


 


Urine Bilirubin   NEGATIVE


 


Urine Urobilinogen   0.2 (NORMAL)


 


Ur Leukocyte Esterase   NEGATIVE


 


Ur Microscopic Review   NOT INDICATED


 


Urine Culture Comments   NOT INDICATED


 


Urine HCG, Qual   NEGATIVE


 


Urine Opiates Screen  NEGATIVE 


 


Ur Oxycodone Screen  NEGATIVE 


 


Urine Methadone Screen  NEGATIVE 


 


Ur Propoxyphene Screen  NEGATIVE 


 


Ur Barbiturates Screen  NEGATIVE 


 


Ur Tricyclics Screen  NEGATIVE 


 


Ur Phencyclidine Scrn  NEGATIVE 


 


Ur Amphetamine Screen  POSITIVE H 


 


U Methamphetamines Scrn  POSITIVE H 


 


U Benzodiazepines Scrn  POSITIVE H 


 


Urine Cocaine Screen  POSITIVE H 


 


U Cannabinoids Screen  POSITIVE H 


 


Ethyl Alcohol  














PD MEDICAL DECISION MAKING





- ED course


ED course: 


Pt with hx and exam as above.


She was altered likely due to using methamphetamines and cocaine as well as 

marijuana and benzos. 


Pt not suicidal or homicidal.


Mother is present and feels comfortable taking the patient home today.


She is now awake, alert ambulatory and stable for discharge.





Departure





- Departure


Record reviewed to determine appropriate education?: Yes Viral Etiology

## 2025-01-12 NOTE — ED ADULT NURSE REASSESSMENT NOTE - NS ED NURSE REASSESS COMMENT FT1
levophed started on pt due to low BP, pt AOx3, pt complains of weakness
Pt's BP reading low. Provider ordered 2 L of LR. Pt having no symptoms. Does not feel weak or lethargic

## 2025-01-12 NOTE — ED PROVIDER NOTE - PHYSICAL EXAMINATION
VITAL SIGNS: I have reviewed nursing notes and confirm.  CONSTITUTIONAL: 68 yo M laying on stretcher; in no acute distress.  SKIN: Skin exam is warm and dry, no acute rash.  HEAD: Normocephalic; atraumatic.  EYES: PERRL, EOM intact; conjunctiva and sclera clear.  ENT: No nasal discharge; airway clear.   CARD: S1, S2 normal; no murmurs, gallops, or rubs. Tachycardic, regular.   RESP: No rales or rhonchi. Speaking in full sentences. (+) mild scattered wheezing  ABD: Normal bowel sounds; soft; non-distended; non-tender; No rebound or guarding. No CVA tenderness.  EXT: Normal ROM. No clubbing, cyanosis or edema. No calf TTP or swelling.   NEURO: Alert, oriented. Grossly unremarkable. No focal deficits.

## 2025-01-12 NOTE — ED PROVIDER NOTE - CRITICAL CARE ATTENDING CONTRIBUTION TO CARE
Patient is a 69-year-old male with a past medical history of depression who presents to the emergency department via ambulance secondary to shortness of breath, cough, and fevers.  Patient was hypoxic and route to the emergency department and EMS reports an O2 sat of 90%.  Patient improves with nasal cannula 2 L.  Patient denies any specific sick contacts but he is coming from a local shelter.  Agree with PA exam as above.  Assessment and plan: Patient with sepsis secondary to influenza.  Confirm diagnosis with viral swab.  Patient is having persistent hypotension here despite IV hydration.  We consulted the ICU attending at [facility] and they agree with starting a Levophed drip and accepted patient to the MICU.  Further details as above.    The patient was seen immediately upon arrival due to a high probability of imminent or life-threatening deterioration secondary to sepsis, which required my direct attention, intervention, and personal management at the bedside. I have personally provided critical care time exclusive of time spent on separately billable procedures. Time includes review of laboratory data, radiology results, discussion with consultants, and monitoring for potential decompensation. Patient is a 69-year-old male with a past medical history of depression who presents to the emergency department via ambulance secondary to shortness of breath, cough, and fevers.  Patient was hypoxic and route to the emergency department and EMS reports an O2 sat of 90%.  Patient improves with nasal cannula 2 L.  Patient denies any specific sick contacts but he is coming from a local shelter.  Agree with PA exam as above.  EKG - agree with above.   Assessment and plan: Patient with sepsis secondary to influenza.  Confirm diagnosis with viral swab.  Patient is having persistent hypotension here despite IV hydration.  We consulted the ICU attending at [facility] and they agree with starting a Levophed drip and accepted patient to the MICU.  Further details as above.    The patient was seen immediately upon arrival due to a high probability of imminent or life-threatening deterioration secondary to sepsis, which required my direct attention, intervention, and personal management at the bedside. I have personally provided critical care time exclusive of time spent on separately billable procedures. Time includes review of laboratory data, radiology results, discussion with consultants, and monitoring for potential decompensation.

## 2025-01-12 NOTE — ED ADULT NURSE NOTE - TEMPLATE
Transition of care outreach postponed for 7 days due to patient's discharge to and continued stay at SNF. Left voice message for SW at [address] at [phone] requesting update on discharge planning. Will defer for 7 days pending discharge disposition.
Respiratory

## 2025-01-12 NOTE — ED PROVIDER NOTE - OBJECTIVE STATEMENT
Back from CT and resting comfortably. Call light within reach.   68 yo M with PMHx of depression presents to the ED BIBEMS from shelter c/o SOB, cough, fever, tremulousness and generalized weakness since this morning. According to EMS SpO2 on arrival was 90%. He was placed on 2L NC with mild improvement in symptoms. He denies any known sick contacts. He denies other complaints. Pt denies nausea, vomiting, abdominal pain, diarrhea, headache, dizziness, chest pain, back pain, LOC, trauma, urinary symptoms, calf pain/swelling, recent travel, recent surgery.

## 2025-01-12 NOTE — ED PROVIDER NOTE - CLINICAL SUMMARY MEDICAL DECISION MAKING FREE TEXT BOX
68 yo M with PMHx of depression presents to the ED BIBEMS from shelter c/o SOB, cough, fever, tremulousness and generalized weakness since this morning. According to EMS SpO2 on arrival was 90%. He was placed on 2L NC with mild improvement in symptoms. He denies any known sick contacts. He denies other complaints. Initial vitals notable for tachycardia ~120s, EKG sinus tach without ischemic changes, /77, rectal temp 100.1F, 98% on 2L. Exam shows frail, elderly male in NAD, mild scattered wheezing, no increased WOB, abd soft/NTND. Labs significant for elevated creatinine/trop/BNP/lactate, positive for flu A. Chest xray without acute pathology, CTA chest shows no PE or large opacity. Pt initially given 1L LR, duonebs, ceftriaxone/azithro, on rpt BP pt hypotensive to 80s/50s, 2nd liter given without improvement, discussed case with intensivist, recommended 3rd liter and reassessment. After 3rd liter BP still 80s/50s, pt sleepy but easily arousable, currently only c/o generalized weakness. Pt accepted to [facility] ICU, started on peripheral levo.

## 2025-01-13 ENCOUNTER — RESULT REVIEW (OUTPATIENT)
Age: 70
End: 2025-01-13

## 2025-01-13 LAB
A1C WITH ESTIMATED AVERAGE GLUCOSE RESULT: 6.4 % — HIGH (ref 4–5.6)
ADD ON TEST-SPECIMEN IN LAB: SIGNIFICANT CHANGE UP
ALBUMIN SERPL ELPH-MCNC: 2.8 G/DL — LOW (ref 3.3–5)
ALBUMIN SERPL ELPH-MCNC: 3 G/DL — LOW (ref 3.3–5)
ALP SERPL-CCNC: 60 U/L — SIGNIFICANT CHANGE UP (ref 40–120)
ALP SERPL-CCNC: 62 U/L — SIGNIFICANT CHANGE UP (ref 40–120)
ALT FLD-CCNC: 55 U/L — HIGH (ref 10–45)
ALT FLD-CCNC: 70 U/L — HIGH (ref 10–45)
ANION GAP SERPL CALC-SCNC: 13 MMOL/L — SIGNIFICANT CHANGE UP (ref 5–17)
ANION GAP SERPL CALC-SCNC: 14 MMOL/L — SIGNIFICANT CHANGE UP (ref 5–17)
ANION GAP SERPL CALC-SCNC: 9 MMOL/L — SIGNIFICANT CHANGE UP (ref 5–17)
APPEARANCE UR: CLEAR — SIGNIFICANT CHANGE UP
APTT BLD: 37.9 SEC — HIGH (ref 24.5–35.6)
AST SERPL-CCNC: 114 U/L — HIGH (ref 10–40)
AST SERPL-CCNC: 88 U/L — HIGH (ref 10–40)
BASOPHILS # BLD AUTO: 0.02 K/UL — SIGNIFICANT CHANGE UP (ref 0–0.2)
BASOPHILS # BLD AUTO: 0.03 K/UL — SIGNIFICANT CHANGE UP (ref 0–0.2)
BASOPHILS NFR BLD AUTO: 0.3 % — SIGNIFICANT CHANGE UP (ref 0–2)
BASOPHILS NFR BLD AUTO: 0.5 % — SIGNIFICANT CHANGE UP (ref 0–2)
BILIRUB SERPL-MCNC: 0.2 MG/DL — SIGNIFICANT CHANGE UP (ref 0.2–1.2)
BILIRUB SERPL-MCNC: 0.2 MG/DL — SIGNIFICANT CHANGE UP (ref 0.2–1.2)
BILIRUB UR-MCNC: NEGATIVE — SIGNIFICANT CHANGE UP
BLD GP AB SCN SERPL QL: NEGATIVE — SIGNIFICANT CHANGE UP
BLD GP AB SCN SERPL QL: NEGATIVE — SIGNIFICANT CHANGE UP
BUN SERPL-MCNC: 18 MG/DL — SIGNIFICANT CHANGE UP (ref 7–23)
BUN SERPL-MCNC: 21 MG/DL — SIGNIFICANT CHANGE UP (ref 7–23)
BUN SERPL-MCNC: 21 MG/DL — SIGNIFICANT CHANGE UP (ref 7–23)
CALCIUM SERPL-MCNC: 7.6 MG/DL — LOW (ref 8.4–10.5)
CALCIUM SERPL-MCNC: 7.7 MG/DL — LOW (ref 8.4–10.5)
CALCIUM SERPL-MCNC: 7.7 MG/DL — LOW (ref 8.4–10.5)
CHLORIDE SERPL-SCNC: 100 MMOL/L — SIGNIFICANT CHANGE UP (ref 96–108)
CHLORIDE SERPL-SCNC: 103 MMOL/L — SIGNIFICANT CHANGE UP (ref 96–108)
CHLORIDE SERPL-SCNC: 103 MMOL/L — SIGNIFICANT CHANGE UP (ref 96–108)
CO2 SERPL-SCNC: 17 MMOL/L — LOW (ref 22–31)
CO2 SERPL-SCNC: 20 MMOL/L — LOW (ref 22–31)
CO2 SERPL-SCNC: 21 MMOL/L — LOW (ref 22–31)
COLOR SPEC: YELLOW — SIGNIFICANT CHANGE UP
CREAT ?TM UR-MCNC: 67 MG/DL — SIGNIFICANT CHANGE UP
CREAT SERPL-MCNC: 1.38 MG/DL — HIGH (ref 0.5–1.3)
CREAT SERPL-MCNC: 1.41 MG/DL — HIGH (ref 0.5–1.3)
CREAT SERPL-MCNC: 1.42 MG/DL — HIGH (ref 0.5–1.3)
DIFF PNL FLD: ABNORMAL
EGFR: 53 ML/MIN/1.73M2 — LOW
EGFR: 54 ML/MIN/1.73M2 — LOW
EGFR: 55 ML/MIN/1.73M2 — LOW
EOSINOPHIL # BLD AUTO: 0.02 K/UL — SIGNIFICANT CHANGE UP (ref 0–0.5)
EOSINOPHIL # BLD AUTO: 0.12 K/UL — SIGNIFICANT CHANGE UP (ref 0–0.5)
EOSINOPHIL NFR BLD AUTO: 0.3 % — SIGNIFICANT CHANGE UP (ref 0–6)
EOSINOPHIL NFR BLD AUTO: 1.9 % — SIGNIFICANT CHANGE UP (ref 0–6)
ESTIMATED AVERAGE GLUCOSE: 137 MG/DL — HIGH (ref 68–114)
ETHANOL SERPL-MCNC: <10 MG/DL — SIGNIFICANT CHANGE UP (ref 0–10)
FOLATE SERPL-MCNC: 6 NG/ML — SIGNIFICANT CHANGE UP
GLUCOSE SERPL-MCNC: 101 MG/DL — HIGH (ref 70–99)
GLUCOSE SERPL-MCNC: 130 MG/DL — HIGH (ref 70–99)
GLUCOSE SERPL-MCNC: 149 MG/DL — HIGH (ref 70–99)
GLUCOSE UR QL: NEGATIVE MG/DL — SIGNIFICANT CHANGE UP
HCT VFR BLD CALC: 30.9 % — LOW (ref 39–50)
HCT VFR BLD CALC: 30.9 % — LOW (ref 39–50)
HGB BLD-MCNC: 10.4 G/DL — LOW (ref 13–17)
HGB BLD-MCNC: 10.7 G/DL — LOW (ref 13–17)
IMM GRANULOCYTES NFR BLD AUTO: 0.2 % — SIGNIFICANT CHANGE UP (ref 0–0.9)
IMM GRANULOCYTES NFR BLD AUTO: 0.5 % — SIGNIFICANT CHANGE UP (ref 0–0.9)
INR BLD: 1.15 — SIGNIFICANT CHANGE UP (ref 0.85–1.16)
IRON SATN MFR SERPL: 28 % — SIGNIFICANT CHANGE UP (ref 16–55)
IRON SATN MFR SERPL: 39 UG/DL — LOW (ref 45–165)
KETONES UR-MCNC: NEGATIVE MG/DL — SIGNIFICANT CHANGE UP
LEGIONELLA AG UR QL: NEGATIVE — SIGNIFICANT CHANGE UP
LEUKOCYTE ESTERASE UR-ACNC: NEGATIVE — SIGNIFICANT CHANGE UP
LYMPHOCYTES # BLD AUTO: 1.76 K/UL — SIGNIFICANT CHANGE UP (ref 1–3.3)
LYMPHOCYTES # BLD AUTO: 2.26 K/UL — SIGNIFICANT CHANGE UP (ref 1–3.3)
LYMPHOCYTES # BLD AUTO: 27.2 % — SIGNIFICANT CHANGE UP (ref 13–44)
LYMPHOCYTES # BLD AUTO: 37.9 % — SIGNIFICANT CHANGE UP (ref 13–44)
MAGNESIUM SERPL-MCNC: 1.7 MG/DL — SIGNIFICANT CHANGE UP (ref 1.6–2.6)
MAGNESIUM SERPL-MCNC: 2 MG/DL — SIGNIFICANT CHANGE UP (ref 1.6–2.6)
MAGNESIUM SERPL-MCNC: 2.1 MG/DL — SIGNIFICANT CHANGE UP (ref 1.6–2.6)
MCHC RBC-ENTMCNC: 32.1 PG — SIGNIFICANT CHANGE UP (ref 27–34)
MCHC RBC-ENTMCNC: 32.7 PG — SIGNIFICANT CHANGE UP (ref 27–34)
MCHC RBC-ENTMCNC: 33.7 G/DL — SIGNIFICANT CHANGE UP (ref 32–36)
MCHC RBC-ENTMCNC: 34.6 G/DL — SIGNIFICANT CHANGE UP (ref 32–36)
MCV RBC AUTO: 94.5 FL — SIGNIFICANT CHANGE UP (ref 80–100)
MCV RBC AUTO: 95.4 FL — SIGNIFICANT CHANGE UP (ref 80–100)
MONOCYTES # BLD AUTO: 0.6 K/UL — SIGNIFICANT CHANGE UP (ref 0–0.9)
MONOCYTES # BLD AUTO: 0.67 K/UL — SIGNIFICANT CHANGE UP (ref 0–0.9)
MONOCYTES NFR BLD AUTO: 10.1 % — SIGNIFICANT CHANGE UP (ref 2–14)
MONOCYTES NFR BLD AUTO: 10.3 % — SIGNIFICANT CHANGE UP (ref 2–14)
MRSA PCR RESULT.: NEGATIVE — SIGNIFICANT CHANGE UP
NEUTROPHILS # BLD AUTO: 3.04 K/UL — SIGNIFICANT CHANGE UP (ref 1.8–7.4)
NEUTROPHILS # BLD AUTO: 3.88 K/UL — SIGNIFICANT CHANGE UP (ref 1.8–7.4)
NEUTROPHILS NFR BLD AUTO: 51 % — SIGNIFICANT CHANGE UP (ref 43–77)
NEUTROPHILS NFR BLD AUTO: 59.8 % — SIGNIFICANT CHANGE UP (ref 43–77)
NITRITE UR-MCNC: NEGATIVE — SIGNIFICANT CHANGE UP
NRBC # BLD: 0 /100 WBCS — SIGNIFICANT CHANGE UP (ref 0–0)
NRBC # BLD: 0 /100 WBCS — SIGNIFICANT CHANGE UP (ref 0–0)
NRBC BLD-RTO: 0 /100 WBCS — SIGNIFICANT CHANGE UP (ref 0–0)
NRBC BLD-RTO: 0 /100 WBCS — SIGNIFICANT CHANGE UP (ref 0–0)
NT-PROBNP SERPL-SCNC: 2160 PG/ML — HIGH (ref 0–300)
OSMOLALITY UR: 381 MOSM/KG — SIGNIFICANT CHANGE UP (ref 300–900)
PCP SPEC-MCNC: SIGNIFICANT CHANGE UP
PCP SPEC-MCNC: SIGNIFICANT CHANGE UP
PH UR: 5 — SIGNIFICANT CHANGE UP (ref 5–8)
PHOSPHATE SERPL-MCNC: 2.1 MG/DL — LOW (ref 2.5–4.5)
PHOSPHATE SERPL-MCNC: 3.4 MG/DL — SIGNIFICANT CHANGE UP (ref 2.5–4.5)
PHOSPHATE SERPL-MCNC: 3.9 MG/DL — SIGNIFICANT CHANGE UP (ref 2.5–4.5)
PLATELET # BLD AUTO: 186 K/UL — SIGNIFICANT CHANGE UP (ref 150–400)
PLATELET # BLD AUTO: 189 K/UL — SIGNIFICANT CHANGE UP (ref 150–400)
POTASSIUM SERPL-MCNC: 3.2 MMOL/L — LOW (ref 3.5–5.3)
POTASSIUM SERPL-MCNC: 3.8 MMOL/L — SIGNIFICANT CHANGE UP (ref 3.5–5.3)
POTASSIUM SERPL-MCNC: 4.2 MMOL/L — SIGNIFICANT CHANGE UP (ref 3.5–5.3)
POTASSIUM SERPL-SCNC: 3.2 MMOL/L — LOW (ref 3.5–5.3)
POTASSIUM SERPL-SCNC: 3.8 MMOL/L — SIGNIFICANT CHANGE UP (ref 3.5–5.3)
POTASSIUM SERPL-SCNC: 4.2 MMOL/L — SIGNIFICANT CHANGE UP (ref 3.5–5.3)
POTASSIUM UR-SCNC: 18 MMOL/L — SIGNIFICANT CHANGE UP
PROCALCITONIN SERPL-MCNC: 1.05 NG/ML — HIGH (ref 0.02–0.1)
PROT ?TM UR-MCNC: 21 MG/DL — HIGH (ref 0–12)
PROT SERPL-MCNC: 4.8 G/DL — LOW (ref 6–8.3)
PROT SERPL-MCNC: 5.1 G/DL — LOW (ref 6–8.3)
PROT UR-MCNC: 30 MG/DL
PROT/CREAT UR-RTO: 0.3 RATIO — HIGH (ref 0–0.2)
PROTHROM AB SERPL-ACNC: 13.2 SEC — SIGNIFICANT CHANGE UP (ref 9.9–13.4)
RBC # BLD: 3.24 M/UL — LOW (ref 4.2–5.8)
RBC # BLD: 3.27 M/UL — LOW (ref 4.2–5.8)
RBC # FLD: 13.2 % — SIGNIFICANT CHANGE UP (ref 10.3–14.5)
RBC # FLD: 13.3 % — SIGNIFICANT CHANGE UP (ref 10.3–14.5)
RH IG SCN BLD-IMP: POSITIVE — SIGNIFICANT CHANGE UP
RH IG SCN BLD-IMP: POSITIVE — SIGNIFICANT CHANGE UP
S AUREUS DNA NOSE QL NAA+PROBE: NEGATIVE — SIGNIFICANT CHANGE UP
S PNEUM AG UR QL: NEGATIVE — SIGNIFICANT CHANGE UP
SODIUM SERPL-SCNC: 133 MMOL/L — LOW (ref 135–145)
SODIUM SERPL-SCNC: 133 MMOL/L — LOW (ref 135–145)
SODIUM SERPL-SCNC: 134 MMOL/L — LOW (ref 135–145)
SODIUM UR-SCNC: 59 MMOL/L — SIGNIFICANT CHANGE UP
SP GR SPEC: 1.05 — HIGH (ref 1–1.03)
TIBC SERPL-MCNC: 141 UG/DL — LOW (ref 220–430)
TROPONIN T, HIGH SENSITIVITY RESULT: 51 NG/L — SIGNIFICANT CHANGE UP (ref 0–51)
TSH SERPL-MCNC: 1.18 UIU/ML — SIGNIFICANT CHANGE UP (ref 0.27–4.2)
UIBC SERPL-MCNC: 102 UG/DL — LOW (ref 110–370)
UROBILINOGEN FLD QL: 0.2 MG/DL — SIGNIFICANT CHANGE UP (ref 0.2–1)
UUN UR-MCNC: 367 MG/DL — SIGNIFICANT CHANGE UP
VIT B12 SERPL-MCNC: 596 PG/ML — SIGNIFICANT CHANGE UP (ref 232–1245)
WBC # BLD: 5.96 K/UL — SIGNIFICANT CHANGE UP (ref 3.8–10.5)
WBC # BLD: 6.48 K/UL — SIGNIFICANT CHANGE UP (ref 3.8–10.5)
WBC # FLD AUTO: 5.96 K/UL — SIGNIFICANT CHANGE UP (ref 3.8–10.5)
WBC # FLD AUTO: 6.48 K/UL — SIGNIFICANT CHANGE UP (ref 3.8–10.5)

## 2025-01-13 PROCEDURE — 93010 ELECTROCARDIOGRAM REPORT: CPT

## 2025-01-13 PROCEDURE — 70450 CT HEAD/BRAIN W/O DYE: CPT | Mod: 26

## 2025-01-13 PROCEDURE — 93306 TTE W/DOPPLER COMPLETE: CPT | Mod: 26

## 2025-01-13 RX ORDER — DM/PSEUDOEPHED/ACETAMINOPHEN 10-30-250
12.5 CAPSULE ORAL ONCE
Refills: 0 | Status: DISCONTINUED | OUTPATIENT
Start: 2025-01-13 | End: 2025-01-24

## 2025-01-13 RX ORDER — MECOBAL/LEVOMEFOLAT CA/B6 PHOS 2-3-35 MG
1 TABLET ORAL DAILY
Refills: 0 | Status: DISCONTINUED | OUTPATIENT
Start: 2025-01-13 | End: 2025-01-24

## 2025-01-13 RX ORDER — DM/PSEUDOEPHED/ACETAMINOPHEN 10-30-250
15 CAPSULE ORAL ONCE
Refills: 0 | Status: DISCONTINUED | OUTPATIENT
Start: 2025-01-13 | End: 2025-01-24

## 2025-01-13 RX ORDER — ANTISEPTIC SURGICAL SCRUB 0.04 MG/ML
1 SOLUTION TOPICAL
Refills: 0 | Status: DISCONTINUED | OUTPATIENT
Start: 2025-01-13 | End: 2025-01-18

## 2025-01-13 RX ORDER — OSELTAMIVIR PHOSPHATE 75 MG/1
30 CAPSULE ORAL EVERY 24 HOURS
Refills: 0 | Status: DISCONTINUED | OUTPATIENT
Start: 2025-01-13 | End: 2025-01-13

## 2025-01-13 RX ORDER — MIDODRINE HYDROCHLORIDE 5 MG/1
5 TABLET ORAL EVERY 8 HOURS
Refills: 0 | Status: DISCONTINUED | OUTPATIENT
Start: 2025-01-13 | End: 2025-01-13

## 2025-01-13 RX ORDER — HEPARIN SODIUM,PORCINE 10000/ML
5000 VIAL (ML) INJECTION EVERY 8 HOURS
Refills: 0 | Status: DISCONTINUED | OUTPATIENT
Start: 2025-01-13 | End: 2025-01-15

## 2025-01-13 RX ORDER — CLONAZEPAM 2 MG
0.5 TABLET ORAL AT BEDTIME
Refills: 0 | Status: DISCONTINUED | OUTPATIENT
Start: 2025-01-13 | End: 2025-01-17

## 2025-01-13 RX ORDER — RISPERIDONE 3 MG
0.5 TABLET ORAL AT BEDTIME
Refills: 0 | Status: DISCONTINUED | OUTPATIENT
Start: 2025-01-13 | End: 2025-01-24

## 2025-01-13 RX ORDER — THIAMINE HCL 100 MG
500 TABLET ORAL EVERY 8 HOURS
Refills: 0 | Status: DISCONTINUED | OUTPATIENT
Start: 2025-01-13 | End: 2025-01-15

## 2025-01-13 RX ORDER — FOLIC ACID 1 MG
1 TABLET ORAL EVERY 24 HOURS
Refills: 0 | Status: DISCONTINUED | OUTPATIENT
Start: 2025-01-13 | End: 2025-01-13

## 2025-01-13 RX ORDER — LEVOTHYROXINE SODIUM 25 UG/1
25 TABLET ORAL DAILY
Refills: 0 | Status: DISCONTINUED | OUTPATIENT
Start: 2025-01-13 | End: 2025-01-24

## 2025-01-13 RX ORDER — GLUCAGON 3 MG/1
1 POWDER NASAL ONCE
Refills: 0 | Status: DISCONTINUED | OUTPATIENT
Start: 2025-01-13 | End: 2025-01-24

## 2025-01-13 RX ORDER — DM/PSEUDOEPHED/ACETAMINOPHEN 10-30-250
25 CAPSULE ORAL ONCE
Refills: 0 | Status: DISCONTINUED | OUTPATIENT
Start: 2025-01-13 | End: 2025-01-24

## 2025-01-13 RX ORDER — IPRATROPIUM BROMIDE AND ALBUTEROL SULFATE .5; 2.5 MG/3ML; MG/3ML
3 SOLUTION RESPIRATORY (INHALATION) EVERY 6 HOURS
Refills: 0 | Status: DISCONTINUED | OUTPATIENT
Start: 2025-01-13 | End: 2025-01-13

## 2025-01-13 RX ORDER — SENNOSIDES 8.6 MG
2 TABLET ORAL AT BEDTIME
Refills: 0 | Status: DISCONTINUED | OUTPATIENT
Start: 2025-01-13 | End: 2025-01-24

## 2025-01-13 RX ORDER — BUPROPION HYDROCHLORIDE 150 MG/1
300 TABLET, EXTENDED RELEASE ORAL DAILY
Refills: 0 | Status: DISCONTINUED | OUTPATIENT
Start: 2025-01-13 | End: 2025-01-24

## 2025-01-13 RX ORDER — POLYETHYLENE GLYCOL 3350 17 G/17G
17 POWDER, FOR SOLUTION ORAL DAILY
Refills: 0 | Status: DISCONTINUED | OUTPATIENT
Start: 2025-01-13 | End: 2025-01-24

## 2025-01-13 RX ORDER — SODIUM CHLORIDE 9 G/ML
1000 INJECTION, SOLUTION INTRAVENOUS
Refills: 0 | Status: DISCONTINUED | OUTPATIENT
Start: 2025-01-13 | End: 2025-01-24

## 2025-01-13 RX ORDER — FOLIC ACID 1 MG
1 TABLET ORAL DAILY
Refills: 0 | Status: DISCONTINUED | OUTPATIENT
Start: 2025-01-14 | End: 2025-01-24

## 2025-01-13 RX ORDER — POTASSIUM CHLORIDE 750 MG/1
40 TABLET, EXTENDED RELEASE ORAL EVERY 4 HOURS
Refills: 0 | Status: DISCONTINUED | OUTPATIENT
Start: 2025-01-13 | End: 2025-01-13

## 2025-01-13 RX ORDER — INSULIN LISPRO 100/ML
VIAL (ML) SUBCUTANEOUS
Refills: 0 | Status: DISCONTINUED | OUTPATIENT
Start: 2025-01-13 | End: 2025-01-24

## 2025-01-13 RX ORDER — MIDODRINE HYDROCHLORIDE 5 MG/1
10 TABLET ORAL EVERY 8 HOURS
Refills: 0 | Status: DISCONTINUED | OUTPATIENT
Start: 2025-01-13 | End: 2025-01-14

## 2025-01-13 RX ORDER — AZITHROMYCIN DIHYDRATE 500 MG/1
500 TABLET, FILM COATED ORAL EVERY 24 HOURS
Refills: 0 | Status: DISCONTINUED | OUTPATIENT
Start: 2025-01-13 | End: 2025-01-13

## 2025-01-13 RX ORDER — POTASSIUM CHLORIDE 750 MG/1
40 TABLET, EXTENDED RELEASE ORAL EVERY 4 HOURS
Refills: 0 | Status: COMPLETED | OUTPATIENT
Start: 2025-01-13 | End: 2025-01-13

## 2025-01-13 RX ORDER — IPRATROPIUM BROMIDE AND ALBUTEROL SULFATE .5; 2.5 MG/3ML; MG/3ML
3 SOLUTION RESPIRATORY (INHALATION) EVERY 4 HOURS
Refills: 0 | Status: DISCONTINUED | OUTPATIENT
Start: 2025-01-13 | End: 2025-01-15

## 2025-01-13 RX ORDER — MAGNESIUM SULFATE 0.8 MEQ/ML
2 AMPUL (ML) INJECTION ONCE
Refills: 0 | Status: COMPLETED | OUTPATIENT
Start: 2025-01-13 | End: 2025-01-13

## 2025-01-13 RX ORDER — OSELTAMIVIR PHOSPHATE 75 MG/1
30 CAPSULE ORAL EVERY 12 HOURS
Refills: 0 | Status: DISCONTINUED | OUTPATIENT
Start: 2025-01-13 | End: 2025-01-15

## 2025-01-13 RX ORDER — THIAMINE HCL 100 MG
500 TABLET ORAL EVERY 24 HOURS
Refills: 0 | Status: DISCONTINUED | OUTPATIENT
Start: 2025-01-13 | End: 2025-01-13

## 2025-01-13 RX ORDER — SOD PHOSPHATE,MONOBASIC-DIBAS 3MMOL/ML
30 VIAL (ML) INTRAVENOUS ONCE
Refills: 0 | Status: COMPLETED | OUTPATIENT
Start: 2025-01-13 | End: 2025-01-13

## 2025-01-13 RX ADMIN — IPRATROPIUM BROMIDE AND ALBUTEROL SULFATE 3 MILLILITER(S): .5; 2.5 SOLUTION RESPIRATORY (INHALATION) at 17:02

## 2025-01-13 RX ADMIN — Medication 5000 UNIT(S): at 05:43

## 2025-01-13 RX ADMIN — Medication 1000 UNIT(S): at 14:46

## 2025-01-13 RX ADMIN — Medication 5000 UNIT(S): at 22:18

## 2025-01-13 RX ADMIN — Medication 50 GRAM(S): at 18:56

## 2025-01-13 RX ADMIN — POTASSIUM CHLORIDE 40 MILLIEQUIVALENT(S): 750 TABLET, EXTENDED RELEASE ORAL at 09:47

## 2025-01-13 RX ADMIN — Medication 85 MILLIMOLE(S): at 22:18

## 2025-01-13 RX ADMIN — Medication 1 TABLET(S): at 12:55

## 2025-01-13 RX ADMIN — OSELTAMIVIR PHOSPHATE 30 MILLIGRAM(S): 75 CAPSULE ORAL at 18:01

## 2025-01-13 RX ADMIN — MIDODRINE HYDROCHLORIDE 5 MILLIGRAM(S): 5 TABLET ORAL at 14:46

## 2025-01-13 RX ADMIN — Medication 0.5 MILLIGRAM(S): at 22:20

## 2025-01-13 RX ADMIN — Medication 5000 UNIT(S): at 14:46

## 2025-01-13 RX ADMIN — Medication 105 MILLIGRAM(S): at 14:46

## 2025-01-13 RX ADMIN — Medication 105 MILLIGRAM(S): at 22:18

## 2025-01-13 RX ADMIN — LEVOTHYROXINE SODIUM 25 MICROGRAM(S): 25 TABLET ORAL at 14:46

## 2025-01-13 RX ADMIN — POLYETHYLENE GLYCOL 3350 17 GRAM(S): 17 POWDER, FOR SOLUTION ORAL at 12:54

## 2025-01-13 RX ADMIN — Medication 25 MILLIGRAM(S): at 14:46

## 2025-01-13 RX ADMIN — Medication 105 MILLIGRAM(S): at 05:43

## 2025-01-13 RX ADMIN — IPRATROPIUM BROMIDE AND ALBUTEROL SULFATE 3 MILLILITER(S): .5; 2.5 SOLUTION RESPIRATORY (INHALATION) at 22:16

## 2025-01-13 RX ADMIN — Medication 1 MILLIGRAM(S): at 06:13

## 2025-01-13 RX ADMIN — MIDODRINE HYDROCHLORIDE 5 MILLIGRAM(S): 5 TABLET ORAL at 09:47

## 2025-01-13 RX ADMIN — POTASSIUM CHLORIDE 40 MILLIEQUIVALENT(S): 750 TABLET, EXTENDED RELEASE ORAL at 08:02

## 2025-01-13 RX ADMIN — BUPROPION HYDROCHLORIDE 300 MILLIGRAM(S): 150 TABLET, EXTENDED RELEASE ORAL at 12:55

## 2025-01-13 RX ADMIN — MIDODRINE HYDROCHLORIDE 10 MILLIGRAM(S): 5 TABLET ORAL at 23:10

## 2025-01-13 RX ADMIN — IPRATROPIUM BROMIDE AND ALBUTEROL SULFATE 3 MILLILITER(S): .5; 2.5 SOLUTION RESPIRATORY (INHALATION) at 09:15

## 2025-01-13 RX ADMIN — Medication 2 TABLET(S): at 22:20

## 2025-01-13 RX ADMIN — OSELTAMIVIR PHOSPHATE 30 MILLIGRAM(S): 75 CAPSULE ORAL at 09:47

## 2025-01-13 RX ADMIN — IPRATROPIUM BROMIDE AND ALBUTEROL SULFATE 3 MILLILITER(S): .5; 2.5 SOLUTION RESPIRATORY (INHALATION) at 06:32

## 2025-01-13 RX ADMIN — IPRATROPIUM BROMIDE AND ALBUTEROL SULFATE 3 MILLILITER(S): .5; 2.5 SOLUTION RESPIRATORY (INHALATION) at 14:04

## 2025-01-13 NOTE — PATIENT PROFILE ADULT - FALL HARM RISK - HARM RISK INTERVENTIONS

## 2025-01-13 NOTE — CONSULT NOTE ADULT - ASSESSMENT
MICU    69 y o M PMH (per chart review) EtOH use disorder, depression, HTN, hypothyroid, CAD, ?CKD, diabetes who was BIBEMS to [facility] from shelter for SOB, cough, fevers, and malaise, started on pressors and admitted to [facility] MICU for septic shock 2/2 influenza.    NEURO   AOx3     #MDD  #?h/o schizoaffective disorder  Psychiatrist Dr. Corrales. Per chart review, has previously been prescribed Wellbutrin, risperidone, clonazepam, gabapentin; only takes Wellbutrin.  Home med: Wellbutrin SR 200mg BID  - c/w buproprion 300mg qd (therapeutic interchange for wellbutrin 150mg BID)     #H/o EtOH use disorder   ~2 drinks per week, Last drink 1/10. Per chart review, was empirically treated for Wernicke’s encephalopathy in 2022 at [place].  - IV thiamine 500mg qd x3d  - folic acid, multivitamin qd  - f/u UTox, BAL  - CIWA protocol    CV   #Hypotension 2/2 septic shock   #Sinus bradycardia  #?h/o HTN and HLD  Per Surescripts, has previously been prescribed lisinopril/HCTZ 20/12.5mg qd, lasix, and atorvastatin but patient is not currently taking/has never filled.  s/p 3L fluids in [facility] ED, remained hypotensive sBP 80s, MAP low 60. TSH wnl.   - Wean levo as tolerated, goal MAP >65   - atropine at bedside    #Elevated troponin   #Elevated pro-BNP   Suspect 2/2 demand ischemia iso septic shock and SHIRA. EKG on presentation without signs of acute infarct.   Pro-BNP 3.5k on admission. Repeat EKG on admission shows sinus bradycardia without evidence of ischemia. QTc 414  - Trend troponin to peak   - f/u pro-BNP  - f/u TTE     PULM   #Influenza PNA  CTA: negative for PE but showed bilateral lower lobe mucus plugging as well as bronchial wall thickening.   - On 2L NC, wean as tolerated, SpO2 goal >94%   - Duonebs q4h standing  - chest PT BID, incentive spirometry  - Rest of plan as below    GI   Diet: regular   Bowel reg: miralax, senna   PPI: none     #Transaminitis  , ALT 89. Ddx hypoperfusion iso septic shock vs alcohol hepatitis  - CTM  - consider RUQ if LFTs worsening    RENAL/   #SHIRA on ?CKD   Cr 1.3-1.4 in 2022. P/w Cr 2.24, suspect pre-renal iso septic shock and decreased po intake. UA spec grav elevated 1.046.    - f/u urine studies   - CTM Cr + lytes     ID   #Septic shock   Patient met sepsis criteria on admission (HR >90, RR >20). RVP +influenza A. UA neg for UTI. Lactate elevated to 4.6. Hypotension refractory to 3L fluids.    Lactate cleared s/p 3L fluids. s/p CTX 1g and azithromycin in ED. Procal (after receiving abx) 1.05.  - c/w tamiflu 30mg q24h x5d (1/12 - 1/16) - dose adjusted for CrCl  - CTM off antibiotics  - f/u BCx x2 drawn on 1/12   - f/u urine strep + Legionella, MRSA nares  - collect sputum cx if able to produce     ENDO   #Pre-diabetes  Prescribed metformin outpatient, has not been filled.   A1c 6.4.  SUSANNE    #R/o Hypothyroidism   Previously prescribed Synthroid 25mcg qd, has never been filled. TSH wnl.  SUSANNE    HEME   #Anemia  Hgb 10.3 s/p 3L fluid resuscitation. Possible h/o pernicious anemia per [facility] chart review.   - f/u b12, folate, iron panel in AM  - folic acid and MV as above    MSK  - PT consult    PPX   F: s/p 3L   E: Mg >2, K>4   N: regular   G: none   DVT ppx: heparin subQ   Dispo: MICU

## 2025-01-13 NOTE — PROGRESS NOTE ADULT - CRITICAL CARE ATTENDING COMMENT
Agree with the plan outlined as above  Wean levo Agree with the plan outlined as above  Wean levo, added midodrine  Awake, alert, tolerating diet, on room air

## 2025-01-13 NOTE — PROVIDER CONTACT NOTE (OTHER) - BACKGROUND
Pt had slight facial droop this AM at rest; resolved when following command of smile and stick out tongue. According to MICU team this is not new

## 2025-01-13 NOTE — H&P ADULT - NSHPPHYSICALEXAM_GEN_ALL_CORE
VITAL SIGNS:  T(C): 36.6 (01-13-25 @ 00:04), Max: 36.6 (01-12-25 @ 20:32)  T(F): 97.8 (01-13-25 @ 00:04), Max: 97.8 (01-12-25 @ 20:32)  HR: 50 (01-13-25 @ 00:04) (50 - 122)  BP: 113/59 (01-13-25 @ 00:04) (76/54 - 113/77)  BP(mean): 80 (01-13-25 @ 00:04) (60 - 84)  RR: 20 (01-13-25 @ 00:04) (20 - 21)  SpO2: 97% (01-13-25 @ 00:04) (92% - 98%)  Wt(kg): --    PHYSICAL EXAM:  Constitutional: NAD, comfortable in bed.  HEENT: NC/AT, PERRLA,  MMM  Neck: Supple, no JVD  Respiratory: CTA B/L. No w/r/r.   Cardiovascular: RRR, normal S1 and S2, no m/r/g.   Gastrointestinal: soft NTND, no guarding or rebound tenderness, no palpable masses   Extremities: wwp; no cyanosis, no LE edema  Vascular: 2+ DP pulses bilaterally  Neurological: AAOx3, no CN deficits, moves all extremities VITAL SIGNS:  T(C): 36.6 (01-13-25 @ 00:04), Max: 36.6 (01-12-25 @ 20:32)  T(F): 97.8 (01-13-25 @ 00:04), Max: 97.8 (01-12-25 @ 20:32)  HR: 50 (01-13-25 @ 00:04) (50 - 122)  BP: 113/59 (01-13-25 @ 00:04) (76/54 - 113/77)  BP(mean): 80 (01-13-25 @ 00:04) (60 - 84)  RR: 20 (01-13-25 @ 00:04) (20 - 21)  SpO2: 97% (01-13-25 @ 00:04) (92% - 98%)  Wt(kg): --    PHYSICAL EXAM:  Constitutional: NAD  HEENT: NC/AT, EOMI, PERRLA, mucus membranes dry  Neck: Supple, no JVD  Respiratory: diffuse expiratory wheezing  Cardiovascular: bradycardic, normal S1 and S2, no m/r/g.   Gastrointestinal: soft NTND  Extremities: wwp; no LE edema  Vascular: 2+ DP pulses bilaterally  Neurological: AAOx3, no CN deficits, moves all extremities

## 2025-01-13 NOTE — DIETITIAN INITIAL EVALUATION ADULT - LAB (SPECIFY)
Monitor K, Mg, and Phos for s/sx refeeding syndrome; replete as necessary -consider thiamine repletion.

## 2025-01-13 NOTE — DIETITIAN INITIAL EVALUATION ADULT - ADD RECOMMEND
-Continue current diet   -Add Ensure Plus High Protein (350 calories and 20 gProt. each) BID   -Encourage good PO intake   -Honor food preferences as able   -Weekly wts  -Monitor chemistry, GI function, and skin integrity

## 2025-01-13 NOTE — CONSULT NOTE ADULT - SUBJECTIVE AND OBJECTIVE BOX
Patient is a 69y old  Male who presents with a chief complaint of fever, cough, SOB (13 Jan 2025 01:26)      HPI:  69M PMH (per chart review) EtOH use disorder, HTN, ?hypothyroidism, ?schizoaffective disorder, CAD, ?CKD, diabetes who was BIBEMS to [facility] from shelter for SOB, cough, fevers, and malaise.    Patient reports feeling generally weak that started this morning. Called EMS because he was trying to get back to his residence but it was too cold outside and he felt weak. Also endorsed SOB, cough, and tremulousness when he presented to the ED. According to EMS SpO2 on arrival was 90%, he was placed on 2L NC with mild improvement in symptoms. He denies any known sick contacts or recent travel. Denies chills, CP, n/v, c/d, dysuria, blood in urine or stool.   Per [facility], patient is regularly prescribed several medications (including but not limited to lisinopril, buproprion, clonazepam, risperidone, metformin, atorvastatin, levothyroxine, etc) but most prescriptions remain unfilled; some medications last filled in May 2024. Pt reports only taking Wellbutrin daily, last took this AM.     No current tobacco use, reports ~12 pack year smoking history. Reports ~2 drinks per week, last drink 3 days ago. Denies other recreational drug use. Lives in residence housing. PCP is his psychiatrist Dr. Corrales whose office is also in his resident housing.    In [facility] ED:    Vitals: T 100.1F rectal,  -> 62, /77 -> 85/53, RR 21 SpO2 92% on 2L NC   Labs: WBC 9.55, Hgb 13.1, plt 262. BUN 25, Cr 2.24. , ALT 89. Lactate 4.6 -> 1.7. Troponin I 89.7 -> 106.3. pro-BNP 3520. RVP +influenza A.    Imaging: CTA negative for PE, b/l lower lobe mucus plugging.   Interventions: ceftriaxone/azithro, tamiflu, IV Mg 2g for repletion, 3L LR, Duonebs, Tylenol  (13 Jan 2025 01:26)    PAST MEDICAL & SURGICAL HISTORY:  H/O: depression        MEDICATIONS  (STANDING):  albuterol/ipratropium for Nebulization 3 milliLiter(s) Nebulizer every 4 hours  buPROPion XL (24-Hour) . 300 milliGRAM(s) Oral daily  folic acid Injectable 1 milliGRAM(s) IV Push every 24 hours  heparin   Injectable 5000 Unit(s) SubCutaneous every 8 hours  influenza  Vaccine (HIGH DOSE) 0.5 milliLiter(s) IntraMuscular once  multivitamin  Chewable 1 Tablet(s) Oral daily  norepinephrine Infusion 0.05 MICROgram(s)/kG/Min (5.53 mL/Hr) IV Continuous <Continuous>  oseltamivir 30 milliGRAM(s) Oral every 24 hours  polyethylene glycol 3350 17 Gram(s) Oral daily  potassium chloride   Powder 40 milliEquivalent(s) Oral every 4 hours  senna 2 Tablet(s) Oral at bedtime  thiamine IVPB 500 milliGRAM(s) IV Intermittent every 24 hours    MEDICATIONS  (PRN):      FAMILY HISTORY:      CBC Full  -  ( 13 Jan 2025 05:30 )  WBC Count : 5.96 K/uL  RBC Count : 3.24 M/uL  Hemoglobin : 10.4 g/dL  Hematocrit : 30.9 %  Platelet Count - Automated : 189 K/uL  Mean Cell Volume : 95.4 fl  Mean Cell Hemoglobin : 32.1 pg  Mean Cell Hemoglobin Concentration : 33.7 g/dL  Auto Neutrophil # : 3.04 K/uL  Auto Lymphocyte # : 2.26 K/uL  Auto Monocyte # : 0.60 K/uL  Auto Eosinophil # : 0.02 K/uL  Auto Basophil # : 0.03 K/uL  Auto Neutrophil % : 51.0 %  Auto Lymphocyte % : 37.9 %  Auto Monocyte % : 10.1 %  Auto Eosinophil % : 0.3 %  Auto Basophil % : 0.5 %      01-13    133[L]  |  103  |  21  ----------------------------<  130[H]  3.2[L]   |  21[L]  |  1.42[H]    Ca    7.6[L]      13 Jan 2025 05:30  Phos  3.4     01-13  Mg     2.1     01-13    TPro  4.8[L]  /  Alb  2.8[L]  /  TBili  0.2  /  DBili  x   /  AST  114[H]  /  ALT  70[H]  /  AlkPhos  60  01-13      Urinalysis Basic - ( 13 Jan 2025 05:30 )    Color: x / Appearance: x / SG: x / pH: x  Gluc: 130 mg/dL / Ketone: x  / Bili: x / Urobili: x   Blood: x / Protein: x / Nitrite: x   Leuk Esterase: x / RBC: x / WBC x   Sq Epi: x / Non Sq Epi: x / Bacteria: x        Radiology :     < from: CT Angio Chest PE Protocol w/ IV Cont (01.12.25 @ 18:37) >    ACC: [id number] EXAM:  CT ANGIO CHEST PULM ART WAWI   ORDERED BY: TATA HAILE     PROCEDURE DATE:  01/12/2025          INTERPRETATION:  CLINICAL INFORMATION: Shortness of breath    COMPARISON: None.    CONTRAST/COMPLICATIONS:  IV Contrast: Omnipaque 350  70 cc administered   30 cc discarded  Oral Contrast: NONE  .    PROCEDURE:  CT Angiography of the Chest.  Sagittal and coronal reformats were performed as well as 3D (MIP)   reconstructions.    FINDINGS:    LUNGS AND LARGE AIRWAYS: Bilateral perinephric bronchial thickening   particularly in the lower lobes with multifocal areas of mucous plugging   in the bilateral lower lobes. Minimal bibasilar atelectasis. No segmental   consolidations.  PLEURA: No pleural effusion.  VESSELS: No definiteevidence of acute pulmonary embolism. Aortic and   coronary vascular calcification.  HEART: Heart size is normal. No pericardial effusion.  MEDIASTINUM AND BRIAN: No lymphadenopathy.  CHEST WALL AND LOWER NECK: Within normal limits.  VISUALIZED UPPER ABDOMEN: Hepatomegaly and hepatic steatosis.  BONES: Degenerative changes. Old left-sided rib fractures. Old left   clavicle fracture.    IMPRESSION:  No evidence of acute pulmonary wasn't.    Bilateral perinephric bronchial thickening particularly inthe lower   lobes with multifocal areas of mucous plugging in the bilateral lower   lobes. Minimal bibasilar atelectasis. No segmental consolidations.             Review of Systems : per HPI         Vital Signs Last 24 Hrs  T(C): 35.9 (13 Jan 2025 05:37), Max: 36.6 (12 Jan 2025 20:32)  T(F): 96.6 (13 Jan 2025 05:37), Max: 97.8 (12 Jan 2025 20:32)  HR: 57 (13 Jan 2025 06:00) (30 - 122)  BP: 81/62 (13 Jan 2025 06:00) (72/52 - 148/92)  BP(mean): 67 (13 Jan 2025 06:00) (58 - 116)  RR: 22 (13 Jan 2025 06:00) (16 - 23)  SpO2: 96% (13 Jan 2025 06:00) (92% - 99%)    Parameters below as of 13 Jan 2025 07:00  Patient On (Oxygen Delivery Method): nasal cannula  O2 Flow (L/min): 2          Physical Exam:  on DROPLET INFL A isolation , 69 y o manlying comfortably in semi Cheung's position , awake , alert , no acute complaints     Head: normocephalic , atraumatic    Eyes: PERRLA , EOMI , no nystagmus , sclera anicteric    ENT / FACE: neg nasal discharge , uvula midline , no oropharyngeal erythema / exudate    Neck: supple , negative JVD , negative carotid bruits , no thyromegaly    Chest: estefanía end exp wheezes    Cardiovascular: regular rate and rhythm , neg murmurs / rubs / gallops    Abdomen: soft , non distended , no tenderness to palpation in all 4 quadrants ,  normal bowel sounds     Extremities: WWP , neg cyanosis /clubbing / edema     Neurologic Exam:     Alert and oriented x 3      Motor Exam:        > 4/5 x 4 extremities     Sensation:         intact to light touch x 4 extremities                              DTR:           biceps/brachioradialis: equal                            patella/ankle: equal       Gait:  not tested         PM&R Impression: admitted for septic shock 2/2 Influenza      - deconditioned       Recommendations / Plan:       1) Physical / Occupational therapy focusing on therapeutic exercises , equipment evaluation , bed mobility/transfer out of bed evaluation , progressive ambulation with assistive devices prn .    2) Current disposition plan recommendation:    pending functional progress

## 2025-01-13 NOTE — PHYSICAL THERAPY INITIAL EVALUATION ADULT - FINE MOTOR COORDINATION, LEFT HAND, DIADOCHOKINESIS SKILLS, OT EVAL
Subjective   Marcela Ramírez is a 54 y.o. female  Anxiety (Wants to discuss increasing Xanax 0.5mg to 1mg qhs)      History of Present Illness  Patient is a pleasant 54-year-old white female who presents for anxiety/insomnia trouble falling asleep has been taking Xanax 0.5 mg would like to increase to 1 mg possibly today but she cannot fall asleep.  Patient states anxiety is also not controlled with 0.5 mg    Patient also comes in today with blood pressure elevated blood pressure of 168/104 she is currently taking lisinopril 20 mg 1 p.o. every day she had no chest pain no shortness of breath.  The following portions of the patient's history were reviewed and updated as appropriate: allergies, current medications, past social history and problem list    Review of Systems   Constitutional: Negative for appetite change, diaphoresis, fatigue and unexpected weight change.   Eyes: Negative for visual disturbance.   Respiratory: Negative for cough, chest tightness and shortness of breath.    Cardiovascular: Negative for chest pain, palpitations and leg swelling.   Gastrointestinal: Negative for diarrhea, nausea and vomiting.   Endocrine: Negative for polydipsia, polyphagia and polyuria.   Skin: Negative for color change and rash.   Neurological: Negative for dizziness, syncope, weakness, light-headedness, numbness and headaches.   Psychiatric/Behavioral: The patient is nervous/anxious.        Objective     Vitals:    06/08/21 1253   BP: (!) 168/104   Pulse: 83   Resp: 14   Temp: 96.9 °F (36.1 °C)   SpO2: 99%       Physical Exam  Vitals and nursing note reviewed.   Constitutional:       Appearance: She is well-developed. She is not diaphoretic.   Neck:      Thyroid: No thyromegaly.      Vascular: No JVD.   Cardiovascular:      Rate and Rhythm: Normal rate and regular rhythm.      Pulses: Normal pulses.      Heart sounds: Normal heart sounds. No murmur heard.     Pulmonary:      Effort: Pulmonary effort is normal. No  respiratory distress.      Breath sounds: Normal breath sounds.   Abdominal:      General: Bowel sounds are normal.      Palpations: Abdomen is soft.      Tenderness: There is no abdominal tenderness.   Musculoskeletal:      Cervical back: Neck supple.   Lymphadenopathy:      Cervical: No cervical adenopathy.   Skin:     General: Skin is warm and dry.   Neurological:      Sensory: No sensory deficit.         Assessment/Plan     Diagnoses and all orders for this visit:    1. Essential hypertension (Primary)    2. ANA ROSA (generalized anxiety disorder)    3. Primary insomnia      #1 increase lisinopril to 40 mg 1 p.o. every day, she was told to double up on her lisinopril      #2 increase to Xanax 1 mg nightly, she was told to double up on medication for 1 week to see if this will help her symptoms of insomnia and anxiety    Follow-up in 1 week to recheck blood pressure       decreased speed bilat/normal performance

## 2025-01-13 NOTE — PROVIDER CONTACT NOTE (OTHER) - SITUATION
When talking to patient about working with PT and moving to chair he complained that he has been having dizziness over the last two weeks

## 2025-01-13 NOTE — H&P ADULT - HISTORY OF PRESENT ILLNESS
69M PMH (per chart review) EtOH use disorder, HTN, hypothyroid, ?schizoaffective disorder, CAD, ?CKD, diabetes who was BIBEMS to [facility] from shelter for SOB, cough, fevers, and malaise.    ****      In [facility] ED:    Vitals: T 100.1F rectal,  -> 62, /77 -> 85/53, RR 21 SpO2 92% on 2L NC   Labs: WBC 9.55, Hgb 13.1, plt 262. BUN 25, Cr 2.24. , ALT 89. Lactate 4.6 -> 1.7. Troponin I 89.7 -> 106.3. pro-BNP 3520. RVP +influenza A.    Imaging: CTA negative for PE, b/l lower lobe mucus plugging.   Interventions: ceftriaxone/azithro, tamiflu, IV Mg 2g for repletion, 3L LR, Duonebs, Tylenol  69M PMH (per chart review) EtOH use disorder, HTN, ?hypothyroidism, ?schizoaffective disorder, CAD, ?CKD, diabetes who was BIBEMS to [facility] from shelter for SOB, cough, fevers, and malaise.    Patient reports feeling generally weak that started this morning. Called EMS because he was trying to get back to his residence but it was too cold outside and he felt weak. Also endorsed SOB, cough, and tremulousness when he presented to the ED. According to EMS SpO2 on arrival was 90%, he was placed on 2L NC with mild improvement in symptoms. He denies any known sick contacts or recent travel. Denies chills, CP, n/v, c/d, dysuria, blood in urine or stool.   Per [facility], patient is regularly prescribed several medications (including but not limited to lisinopril, buproprion, clonazepam, risperidone, metformin, atorvastatin, levothyroxine, etc) but most prescriptions remain unfilled; some medications last filled in May 2024. Pt reports only taking Wellbutrin daily, last took this AM.     No current tobacco use, reports ~12 pack year smoking history. Reports ~2 drinks per week, last drink 3 days ago. Denies other recreational drug use. Lives in residence housing. PCP is his psychiatrist Dr. Corrales whose office is also in his resident housing.    In [facility] ED:    Vitals: T 100.1F rectal,  -> 62, /77 -> 85/53, RR 21 SpO2 92% on 2L NC   Labs: WBC 9.55, Hgb 13.1, plt 262. BUN 25, Cr 2.24. , ALT 89. Lactate 4.6 -> 1.7. Troponin I 89.7 -> 106.3. pro-BNP 3520. RVP +influenza A.    Imaging: CTA negative for PE, b/l lower lobe mucus plugging.   Interventions: ceftriaxone/azithro, tamiflu, IV Mg 2g for repletion, 3L LR, Duonebs, Tylenol

## 2025-01-13 NOTE — DIETITIAN INITIAL EVALUATION ADULT - PERTINENT MEDS FT
MEDICATIONS  (STANDING):  albuterol/ipratropium for Nebulization 3 milliLiter(s) Nebulizer every 4 hours  buPROPion XL (24-Hour) . 300 milliGRAM(s) Oral daily  chlorhexidine 2% Cloths 1 Application(s) Topical <User Schedule>  cholecalciferol 1000 Unit(s) Oral daily  clonazePAM  Tablet 0.5 milliGRAM(s) Oral at bedtime  heparin   Injectable 5000 Unit(s) SubCutaneous every 8 hours  influenza  Vaccine (HIGH DOSE) 0.5 milliLiter(s) IntraMuscular once  levothyroxine 25 MICROGram(s) Oral daily  midodrine 5 milliGRAM(s) Oral every 8 hours  multivitamin  Chewable 1 Tablet(s) Oral daily  norepinephrine Infusion 0.05 MICROgram(s)/kG/Min (5.53 mL/Hr) IV Continuous <Continuous>  oseltamivir 30 milliGRAM(s) Oral every 12 hours  polyethylene glycol 3350 17 Gram(s) Oral daily  risperiDONE   Tablet 0.5 milliGRAM(s) Oral at bedtime  senna 2 Tablet(s) Oral at bedtime    MEDICATIONS  (PRN):

## 2025-01-13 NOTE — H&P ADULT - ATTENDING COMMENTS
70 y/o M admitted w fever , cough, +influenza A.  Mildly hypotensive at [facility] after 3 liters crystalloid and started on low dose levophed.  Procal mildly elevated but have deferred antibacterials for now.  No c/o SOB, gross lobar pneumonia on CT. Received CAP treatment at [facility]  Other acute issues include bradycardia, w short stretch junctional bradycardia to 30s, and possible EtOH use.  Plan  ICU monitoring-Pt w/o complaints SOB  Liberalize PO intake.  ECHO--consideer cardiology consult if junctional rhythm recurrent.  1 h critical care assessing pt and formulating treatment plan

## 2025-01-13 NOTE — H&P ADULT - NSHPSOCIALHISTORY_GEN_ALL_CORE
Per chart review, h/o EtOH use disorder, was empirically treated for Wernicke encephalopathy in 2022 at [facility].     Lives in shelter. No current tobacco use, reports ~12 pack year smoking history. Reports ~2 drinks per week, last drink 3 days ago. Denies other recreational drug use. Lives in residence housing.   Per chart review, h/o EtOH use disorder, was empirically treated for Wernicke encephalopathy in 2022 at [place].

## 2025-01-13 NOTE — PHYSICAL THERAPY INITIAL EVALUATION ADULT - ADDITIONAL COMMENTS
Pt. reports elevator access, uses SC for ambulation. reports h/o dizziness/vertigo affecting balance, but denies falls. Pt. was told he might have inner ear problem affecting balance.

## 2025-01-13 NOTE — DIETITIAN INITIAL EVALUATION ADULT - OTHER CALCULATIONS
Needs determined using current body weight 59 kg (83%IBW) adjusted for protein-calorie malnutrition.

## 2025-01-13 NOTE — DIETITIAN INITIAL EVALUATION ADULT - PERTINENT LABORATORY DATA
01-13    133[L]  |  103  |  21  ----------------------------<  130[H]  3.2[L]   |  21[L]  |  1.42[H]    Ca    7.6[L]      13 Jan 2025 05:30  Phos  3.4     01-13  Mg     2.1     01-13    TPro  4.8[L]  /  Alb  2.8[L]  /  TBili  0.2  /  DBili  x   /  AST  114[H]  /  ALT  70[H]  /  AlkPhos  60  01-13  A1C with Estimated Average Glucose Result: 6.4 % (01-13-25 @ 02:27)

## 2025-01-13 NOTE — PHYSICAL THERAPY INITIAL EVALUATION ADULT - MODALITIES TREATMENT COMMENTS
Resting R sided facial droop noted, decreased buccinator strength and to a lesser degree zygomaticus strength noted on the R, rest of cranial nerve exam w/o deficits. Possibly chronic residuals post Bell's palsy? Symmetrically decreased strength in UE/LEs, without clear focal unilateral deficits.  Gait - unsteady.

## 2025-01-13 NOTE — DIETITIAN NUTRITION RISK NOTIFICATION - ADDITIONAL COMMENTS/DIETITIAN RECOMMENDATIONS
Pt meets ASPEN criteria for severe, chronic protein-calorie malnutrition in setting of significant muscle/fat wasting, unintentional wt loss, and inadequate intake.

## 2025-01-13 NOTE — H&P ADULT - NSHPLABSRESULTS_GEN_ALL_CORE
LABS:                         13.1   9.55  )-----------( 262      ( 2025 16:50 )             39.2         139  |  102  |  25[H]  ----------------------------<  90  4.5   |  23  |  2.24[H]    Ca    8.7      2025 16:50  Mg     1.5         TPro  7.0  /  Alb  3.4  /  TBili  0.6  /  DBili  x   /  AST  109[H]  /  ALT  89[H]  /  AlkPhos  81  -12    PT/INR - ( 2025 16:50 )   PT: 14.3 sec;   INR: 1.24          PTT - ( 2025 16:50 )  PTT:39.4 sec  Urinalysis Basic - ( 2025 16:50 )    Color: Yellow / Appearance: Clear / S.046 / pH: x  Gluc: 90 mg/dL / Ketone: Negative mg/dL  / Bili: Negative / Urobili: 0.2 mg/dL   Blood: x / Protein: 30 mg/dL / Nitrite: Negative   Leuk Esterase: Negative / RBC: 2 /HPF / WBC 2 /HPF   Sq Epi: x / Non Sq Epi: x / Bacteria: Few /HPF                  RADIOLOGY, EKG & ADDITIONAL TESTS: Reviewed.

## 2025-01-13 NOTE — PHYSICAL THERAPY INITIAL EVALUATION ADULT - PERTINENT HX OF CURRENT PROBLEM, REHAB EVAL
Pt. is a 69 y.o male presenting by EMS from shelter with cough, SOB , malaise, fevers.   Pt. has been admitted to MICU for management of septic shock requiring pressure support i/s/o influenza A.

## 2025-01-13 NOTE — PROGRESS NOTE ADULT - ASSESSMENT
69M PMH (per chart review) EtOH use disorder, depression, HTN, hypothyroid, CAD, ?CKD, diabetes who was BIBEMS to [facility] from shelter for SOB, cough, fevers, and malaise, started on pressors and admitted to [facility] MICU for septic shock 2/2 influenza.    NEURO   #MDD  #?h/o schizoaffective disorder  Psychiatrist Dr. Corrales. Per chart review, has previously been prescribed Wellbutrin, risperidone, clonazepam, gabapentin; reports only taking Wellbutrin.   Home med: Wellbutrin SR 200mg BID, iSTOP with clonazepam filled montly last filled 12/18 and per discussion today with patient's PCP he has most recently been rx'd risperidone, clonazepam, wellbutin (per the  shop last filled on 12/19/24)  - c/w buproprion 300mg qd (therapeutic interchange for wellbutrin 150mg BID)   - Start risperidone 0.5mg HS  - Start clonazepam 0.5mg HS    #H/o Alcohol use disorder   ~2 drinks per week, Last drink 1/10. Per chart review, was empirically treated for Wernicke’s encephalopathy in 2022 at [facility]. Discussed with mary's PCP about AUD and she states he is not forthcoming about his alcohol intake and quantity and frequency is not clear.   - IV thiamine 500mg qd x3d  - start folic acid, multivitamin qd  - utox unable to be run x 2 due to acidic urine  - CIWA protocol will consider phenobarb protocol if CIWA > 8     #Chronic Dizziness and unsteady gait  Patient c/o dizziness x 2 weeks. Per patient's PCP he has been chronically been c/o dizziness x months and has been having gait difficulties  - F/u [facility]ad  - Will consider Neurology consult   - Start meclizine PRN   - c/w high dose thiamine  - PT/OT consult     CV   #Septic Shock  #Sinus bradycardia; Resolved  #h/o HTN and HLD  Per [facility], has previously been prescribed lisinopril/HCTZ 20/12.5mg qd, lasix, and atorvastatin but patient is not currently taking/has never filled.  s/p 3L fluids in [facility] ED, remained hypotensive sBP 80s, MAP low 60. TSH wnl.   - Wean levo as tolerated, goal MAP >65   - Midodrine 5mg q8 to wean vasopressors   - Watch HR closesly w initiation of midodrine given h/o bradycardia  - atropine at bedside    #Elevated troponin   #Elevated pro-BNP   Suspect 2/2 demand ischemia iso septic shock and SHIRA. EKG on presentation without signs of acute infarct.   Pro-BNP 3.5k on admission. Repeat EKG on admission shows sinus bradycardia without evidence of ischemia. QTc 414  - Troponin peaked @ 51   - TTE 1/13 normal systolic function    PULMONOLOGY  #Acute hypoxic respiratory failure  #Influenza PNA  CTA: negative for PE but showed bilateral lower lobe mucus plugging as well as bronchial wall thickening.   - On 2L NC, wean as tolerated, SpO2 goal >94%   - Duoneb q4h standing  - chest PT BID, incentive spirometry, aerobika   - Rest of plan as below    GI   #Transaminitis  , ALT 89. Ddx hypoperfusion iso septic shock vs alcohol hepatitis  - Trend daily  - consider RUQ if LFTs worsening  - Hepatitis panel w AM labs     RENAL/   #SHIRA  likely prerenal 2/2 hypovolemia  Cr 1.3-1.4 in 2022 and per PCP 1.27 in August 2024. P/w Cr 2.24, suspect pre-renal iso septic shock and decreased po intake. UA spec grav elevated 1.046, CK 1026, FeNa 0.9%   - BMP daily     #Mild Hyponatremia(132) possibly 2/2 SIADH 2/2 influenza; appears euvolemic on exam  -Evening BMP   -If Na continues to downtrend will consider fluid restrictions    INFECTIOUS DISEASE  #Septic shock   Patient met sepsis criteria on admission (HR >90, RR >20). RVP +influenza A. UA neg for UTI. Lactate elevated to 4.6. Hypotension refractory to 3L fluids.    Lactate cleared s/p 3L fluids. s/p CTX 1g and azithromycin in ED. Procal (after receiving abx) 1.05.  - c/w tamiflu 30mg q24h x5d (1/12 - 1/16) - dose adjusted for CrCl  - Will keep off abx for now if persistently febrile or if worsening shock will start CTX 2g/Azithro 500mg for CAP coverage  - f/u BCx x2 drawn on 1/12   - Urine strep/legionella negative  - f/u MRSA  - collect sputum cx if able to produce     ENDO   #Pre-diabetes  Prescribed metformin outpatient, has not been filled. A1c 6.4.  -SSI w premeal finger sticks    #R/o Hypothyroidism   Previously prescribed Synthroid 25mcg qd filled 12/12/25 per the  shop   - TSH 1.18 T4 4.52  - Start home dose of levothyroxine 25mcg    HEME   #Anemia likely anemia of chronic disease or from sepsis   Hgb 10.3 s/p 3L fluid resuscitation. Possible h/o pernicious anemia per [facility] chart review.   - Folate 6.0, B12 596  - folic acid and MV as above  - Maintain active T&S  - Daily CBC    MSK  #Unsteady gait   - PT/OT consult    Maintainence  F: s/p 3L  E: Replete K>4 Mg>2 Phos>3  D: Regular  GI PPx: Not indicated  VTE PPx: SQH  Code status: Full  Emergency Contact: Not file  Dispo: ADONIS Lewis [facility]  c: [phone]  p: [phone]

## 2025-01-13 NOTE — PROGRESS NOTE ADULT - SUBJECTIVE AND OBJECTIVE BOX
*****INCOMPLETE*****     Patient is a 69y old  Male who presents with a chief complaint of fever, cough, SOB (13 Jan 2025 07:14)      Hospital Course:    INTERVAL HPI/OVERNIGHT EVENTS:     No overnight events   Afebrile, hemodynamically stable     Subjective: Patient seen and examined at bedside.  REVIEW OF SYSTEMS: **Incomplete**  CONSTITUTIONAL:  Denies weakness, fevers, or chills  EYES/ENT:  Denies visual changes, vertigo, or throat pain   NECK:  Denies pain or stiffness  RESPIRATORY:  Denies SOB, cough, wheezing, hemoptysis  CARDIOVASCULAR:  Denies chest pain or palpitations  GASTROINTESTINAL:  Denies abdominal pain, nausea, vomiting, or hematemesis; No diarrhea or constipation. No melena or hematochezia.  GENITOURINARY:  Denies dysuria, change in frequency, or hematuria  NEUROLOGICAL:  Denies numbness or weakness  SKIN:  Denies itching or rashes      Objective:   ICU Vital Signs Last 24 Hrs  T(C): 35.9 (13 Jan 2025 05:37), Max: 36.6 (12 Jan 2025 20:32)  T(F): 96.6 (13 Jan 2025 05:37), Max: 97.8 (12 Jan 2025 20:32)  HR: 48 (13 Jan 2025 07:00) (30 - 122)  BP: 96/67 (13 Jan 2025 07:00) (72/52 - 148/92)  BP(mean): 77 (13 Jan 2025 07:00) (58 - 116)  ABP: --  ABP(mean): --  RR: 17 (13 Jan 2025 07:00) (16 - 23)  SpO2: 99% (13 Jan 2025 07:00) (92% - 99%)    O2 Parameters below as of 13 Jan 2025 08:00  Patient On (Oxygen Delivery Method): nasal cannula  O2 Flow (L/min): 2        I&O's Summary    12 Jan 2025 07:01  -  13 Jan 2025 07:00  --------------------------------------------------------  IN: 173.1 mL / OUT: 275 mL / NET: -101.9 mL          PHYSICAL EXAM:  GENERAL: No acute distress   HEAD:  Atraumatic, Normocephalic  EYES: EOMI, PERRLA, conjunctiva and sclera clear  ENMT: No tonsillar erythema, exudates, or enlargement; Moist mucous membranes  NECK: Supple, No JVD, Normal thyroid  HEART: Regular rate and rhythm; No murmurs, rubs, or gallops  RESPIRATORY: CTA B/L, No W/R/R  ABDOMEN: Soft, Nontender, Nondistended; Bowel sounds present  NEUROLOGY: A&Ox3, nonfocal, moving all extremities  EXTREMITIES:  2+ Peripheral Pulses, No clubbing, cyanosis, or edema  SKIN: warm, dry, normal color, no rash or abnormal lesions    LABS:                        10.4   5.96  )-----------( 189      ( 13 Jan 2025 05:30 )             30.9     01-13    133[L]  |  103  |  21  ----------------------------<  130[H]  3.2[L]   |  21[L]  |  1.42[H]    Ca    7.6[L]      13 Jan 2025 05:30  Phos  3.4     01-13  Mg     2.1     01-13    TPro  4.8[L]  /  Alb  2.8[L]  /  TBili  0.2  /  DBili  x   /  AST  114[H]  /  ALT  70[H]  /  AlkPhos  60  01-13    PT/INR - ( 13 Jan 2025 05:30 )   PT: 13.2 sec;   INR: 1.15          PTT - ( 13 Jan 2025 05:30 )  PTT:37.9 sec  Urinalysis Basic - ( 13 Jan 2025 05:30 )    Color: x / Appearance: x / SG: x / pH: x  Gluc: 130 mg/dL / Ketone: x  / Bili: x / Urobili: x   Blood: x / Protein: x / Nitrite: x   Leuk Esterase: x / RBC: x / WBC x   Sq Epi: x / Non Sq Epi: x / Bacteria: x      CAPILLARY BLOOD GLUCOSE            Micro:      RADIOLOGY & ADDITIONAL TESTS:    Consultant(s) Notes Reviewed:  [x ] YES  [ ] NO    MEDICATIONS  (STANDING):  albuterol/ipratropium for Nebulization 3 milliLiter(s) Nebulizer every 4 hours  buPROPion XL (24-Hour) . 300 milliGRAM(s) Oral daily  folic acid Injectable 1 milliGRAM(s) IV Push every 24 hours  heparin   Injectable 5000 Unit(s) SubCutaneous every 8 hours  influenza  Vaccine (HIGH DOSE) 0.5 milliLiter(s) IntraMuscular once  multivitamin  Chewable 1 Tablet(s) Oral daily  norepinephrine Infusion 0.05 MICROgram(s)/kG/Min (5.53 mL/Hr) IV Continuous <Continuous>  oseltamivir 30 milliGRAM(s) Oral every 24 hours  polyethylene glycol 3350 17 Gram(s) Oral daily  potassium chloride   Powder 40 milliEquivalent(s) Oral every 4 hours  senna 2 Tablet(s) Oral at bedtime  thiamine IVPB 500 milliGRAM(s) IV Intermittent every 24 hours    MEDICATIONS  (PRN):      Care Discussed with Consultants/Other Providers [ x] YES  [ ] NO   Patient is a 69y old  Male who presents with a chief complaint of fever, cough, SOB (13 Jan 2025 07:14)    ED Course:   Mr. Robb presented to [facility] ED on 1/12 BIBEMS w c/o genarlized weakness x the AM, shortness of breath, cough and tremors. SPO2 by EMS reportedly 90% on RA so he was placed on 2LPM NC. While in ED vital signs initially 100.1f (rectal), , BP 85/53, RR 21, SPO2 92% on 2 LPM with labs s/f BUN 25, Cr 2.234, , ALT 89, initial lactate of 4.6, troponin of 89, proBNP 3520 and RVP +influenza A. CTA of chest negative for PE but with b/l lower lobe mucus plugging. He was given CTX/Azithro, tamiflu, Duonebs, 3L LR, Mg 2g and tylenol. His lactate cleared but he remained hypotensive so was started on noepinephrine and transferreed to MICU at [facility]     Hospital/MICU Course (1/13-  ):   Admitted to MICU on 1/13 arrived from [facility] on Norepi about 1am and was initially titrated off but became hypotensive to 70s/50s so NE was resumed and patient had brief episode of bradycardia to 30s with possible junctional rhythm which self-resolved. His Cr downtrended to 1.4 and is still being treated for septic shock.     Interval History/Overnight event: Midodrine was started to attempt to wean off norepi, abx dc'd will continue to monitor off abx, potassaium repleted and he was weaned of O2 and is now on RA.     Subjective: Patient seen and examined at bedside.  REVIEW OF SYSTEMS:   CONSTITUTIONAL:  +Generalized weakness/chills. Denies fevers   EYES/ENT: +Dizziness reports x 2 weeks. Denies visual changes, ear pain or throat pain   NECK: Denies pain or stiffness  RESPIRATORY: +cough.  Denies SOB  CARDIOVASCULAR:  Denies chest pain or palpitations  GASTROINTESTINAL:  Denies abdominal pain, nausea, vomiting, diarrhea or constipation.   GENITOURINARY: Denies dysuria, change in frequency, or hematuria  NEUROLOGICAL: +dizziness. Denies numbness.  SKIN:  Denies itching or rashes    Objective:   ICU Vital Signs Last 24 Hrs  T(C): 35.9 (13 Jan 2025 05:37), Max: 36.6 (12 Jan 2025 20:32)  T(F): 96.6 (13 Jan 2025 05:37), Max: 97.8 (12 Jan 2025 20:32)  HR: 48 (13 Jan 2025 07:00) (30 - 122)  BP: 96/67 (13 Jan 2025 07:00) (72/52 - 148/92)  BP(mean): 77 (13 Jan 2025 07:00) (58 - 116)  RR: 17 (13 Jan 2025 07:00) (16 - 23)  SpO2: 99% (13 Jan 2025 07:00) (92% - 99%)  O2 Parameters below as of 13 Jan 2025 08:00  Patient On (Oxygen Delivery Method): nasal cannula  O2 Flow (L/min): 2    I&O's Summary  12 Jan 2025 07:01  -  13 Jan 2025 07:00  --------------------------------------------------------  IN: 173.1 mL / OUT: 275 mL / NET: -101.9 mL    PHYSICAL EXAM:  General: Elderly male in hospital bed appearing comfortable laying in bed, speaking in full sentences  HEENT: head NC/AT, no conjunctival injection, EOMI, MMM  Neck: supple, no JVD  Cardio: RRR, +S1/S2, no M/R/G  Resp: b/l expiratory wheeze to auscultation, +nonproductive cough. No rales/rhonchi, negative egophony and no and no retractions or accessory muscle use.   Abdo: soft, NT, ND, +bowel sounds x4  Extremities: WWP, no edema/cyanosis/clubbing   Vasc: 2+ radial and DP pulses b/l  Neuro: A&Ox3, nonfocal, moving all extremities  Psych: speech non-pressured, thoughts goal-oriented  Skin: dry, intact, no visible jaundice   Lines: PIV x2 (b/l 20g cdi)    LABS:                      10.4   5.96  )-----------( 189      ( 13 Jan 2025 05:30 )             30.9   01-13  133[L]  |  103  |  21  ----------------------------<  130[H]  3.2[L]   |  21[L]  |  1.42[H]  Ca    7.6[L]      13 Jan 2025 05:30  Phos  3.4     01-13  Mg     2.1     01-13  TPro  4.8[L]  /  Alb  2.8[L]  /  TBili  0.2  /  DBili  x   /  AST  114[H]  /  ALT  70[H]  /  AlkPhos  60  01-13  PT/INR - ( 13 Jan 2025 05:30 )   PT: 13.2 sec;   INR: 1.15     PTT - ( 13 Jan 2025 05:30 )  PTT:37.9 sec    Urinalysis Basic - ( 13 Jan 2025 05:30 )  Color: x / Appearance: x / SG: x / pH: x  Gluc: 130 mg/dL / Ketone: x  / Bili: x / Urobili: x   Blood: x / Protein: x / Nitrite: x   Leuk Esterase: x / RBC: x / WBC x   Sq Epi: x / Non Sq Epi: x / Bacteria: x    CAPILLARY BLOOD GLUCOSE    Micro:  COVID/Flu/RSV 1/12: +Influenza A   Bcx 1/12: Received no results     RADIOLOGY & ADDITIONAL TESTS:\  TTE 1/13:   CONCLUSIONS:   1. Normal left and right ventricular size and systolic function.   2. No significant valvular disease.   3. No evidence of pulmonary hypertension.   4. No pericardial effusion.   5. No prior echo is available for comparison.  CTA Chest 1/12: IMPRESSION:  No evidence of acute pulmonary.  Bilateral perinephric bronchial thickening particularly in the lower lobes with multifocal areas of mucous plugging in the bilateral lower lobes. Minimal bibasilar atelectasis. No segmental consolidations.    Chest XR 1/12:  IMPRESSION: The heart and lungs are normal. Chronic bilateral clavicle fractures.    Consultant(s) Notes Reviewed:  [x ] YES  [ ] NO    MEDICATIONS  (STANDING):  albuterol/ipratropium for Nebulization 3 milliLiter(s) Nebulizer every 4 hours  buPROPion XL (24-Hour) . 300 milliGRAM(s) Oral daily  folic acid Injectable 1 milliGRAM(s) IV Push every 24 hours  heparin   Injectable 5000 Unit(s) SubCutaneous every 8 hours  influenza  Vaccine (HIGH DOSE) 0.5 milliLiter(s) IntraMuscular once  multivitamin  Chewable 1 Tablet(s) Oral daily  norepinephrine Infusion 0.05 MICROgram(s)/kG/Min (5.53 mL/Hr) IV Continuous <Continuous>  oseltamivir 30 milliGRAM(s) Oral every 24 hours  polyethylene glycol 3350 17 Gram(s) Oral daily  potassium chloride   Powder 40 milliEquivalent(s) Oral every 4 hours  senna 2 Tablet(s) Oral at bedtime  thiamine IVPB 500 milliGRAM(s) IV Intermittent every 24 hours    Care Discussed with Consultants/Other Providers [ x] YES  [ ] NO

## 2025-01-13 NOTE — H&P ADULT - ASSESSMENT
69M PMH (per chart review) EtOH use disorder, HTN, hypothyroid, ?schizoaffective disorder, CAD, ?CKD, diabetes who was BIBEMS to [facility] from shelter for SOB, cough, fevers, and malaise, found to be +flu, became hypotensive sBP 80s despite 3L fluids, started on pressors and transferred to [facility] MICU.      NEURO   #H/o schizoaffective disorder   #H/o EtOH use disorder   Previously prescribed buproprion, risperidone, clonazepam, gabapentin; has not recently been taking any medications.   Per chart review, was empirically treated for Wernicke’s encephalopathy in 2022 at [place].  -thiamine, folic acid, multivitamin qd  - f/u UTox     CV   #Hypotension 2/2 septic shock   #h/o HTN   Per Surescripts, has previously been prescribed lisinopril, HCTZ, and lasix but patient has never filled.   s/p 3L fluids in [facility] ED, remained hypotensive sBP 80s, MAP low 60.   - Wean levo as tolerated, goal MAP >65     #Elevated troponin   #Elevated pro-BNP   Suspect 2/2 demand ischemia iso septic shock and SHIRA. EKG on admission without signs of acute infarct.   Pro-BNP 3k on admission.   - Trend to troponin peak   - f/u EKG   - f/u TTE     #CAD   Previously prescribed atorvastatin 20mg qhs.     PULM   #Influenza PNA   #C/F superimposed bacterial PNA   On 2L NC, wean as tolerated, SpO2 goal >92%   Rest of plan as below    GI   Diet: regular   Bowel reg: miralax, senna   PPI: none     RENAL/   #SHIRA on ?CKD   Cr 1.3-1.4 in 2022. P/w Cr 2.24, suspect pre-renal iso septic shock and decreased po intake and shock. UA spec grav elevated 1.046.    - f/u urine studies   - CTM Cr + lytes     ID   #Septic shock   Patient met sepsis criteria on admission (HR >90, RR >20). RVP +influenza A. UA neg for UTI. Lactate elevated to 4.6. Hypotension refractory to 3L fluids.    Lactate cleared s/p 3L fluids.   - c/w tamiflu x5d (1/12 - 1/16)   - c/w CTX 2g qd and azithromycin 500mg qd (1/12 - ) (due to c/f superimposed bacterial PNA)   - f/u BCx x2 drawn on 1/12   - f/u urine strep + Legionella   - MRSA nares   - collect sputum cx if able to produce     ENDO   #Diabetes   Prescribed metformin outpatient, has not been filled.   - f/u A1c    #?H/o Hypothyroidism   Previously prescribed Synthroid 25mcg qd, has never been filled.   - f/u TSH      HEME   SUSANNE     PPX   F: s/p 3L   E: Mg >2, K>4   N: regular   G: none   DVT ppx: heparin subQ   Code: FULL CODE   Dispo: MICU  69M PMH (per chart review) EtOH use disorder, HTN, hypothyroid, ?schizoaffective disorder, CAD, ?CKD, diabetes who was BIBEMS to [facility] from shelter for SOB, cough, fevers, and malaise, started on pressors and admitted to [facility] MICU for septic shock 2/2 influenza.    NEURO   #H/o schizoaffective disorder   #H/o EtOH use disorder   Previously prescribed buproprion, risperidone, clonazepam, gabapentin; has not recently been taking any medications.   Per chart review, was empirically treated for Wernicke’s encephalopathy in 2022 at [place].  -thiamine, folic acid, multivitamin qd  - f/u UTox     CV   #Hypotension 2/2 septic shock   #h/o HTN   Per Surescripts, has previously been prescribed lisinopril, HCTZ, and lasix but patient has never filled.   s/p 3L fluids in [facility] ED, remained hypotensive sBP 80s, MAP low 60.   - Wean levo as tolerated, goal MAP >65     #Elevated troponin   #Elevated pro-BNP   Suspect 2/2 demand ischemia iso septic shock and SHIRA. EKG on admission without signs of acute infarct.   Pro-BNP 3k on admission.   - Trend to troponin peak   - f/u EKG   - f/u TTE     #CAD   Previously prescribed atorvastatin 20mg qhs.     PULM   #Influenza PNA   #C/F superimposed bacterial PNA   On 2L NC, wean as tolerated, SpO2 goal >92%   Rest of plan as below    GI   Diet: regular   Bowel reg: miralax, senna   PPI: none     RENAL/   #SHIRA on ?CKD   Cr 1.3-1.4 in 2022. P/w Cr 2.24, suspect pre-renal iso septic shock and decreased po intake and shock. UA spec grav elevated 1.046.    - f/u urine studies   - CTM Cr + lytes     ID   #Septic shock   Patient met sepsis criteria on admission (HR >90, RR >20). RVP +influenza A. UA neg for UTI. Lactate elevated to 4.6. Hypotension refractory to 3L fluids.    Lactate cleared s/p 3L fluids.   - c/w tamiflu x5d (1/12 - 1/16)   - c/w CTX 2g qd and azithromycin 500mg qd (1/12 - ) (due to c/f superimposed bacterial PNA)   - f/u BCx x2 drawn on 1/12   - f/u urine strep + Legionella   - MRSA nares   - collect sputum cx if able to produce     ENDO   #Diabetes   Prescribed metformin outpatient, has not been filled.   - f/u A1c    #?H/o Hypothyroidism   Previously prescribed Synthroid 25mcg qd, has never been filled.   - f/u TSH      HEME   SUSANNE     PPX   F: s/p 3L   E: Mg >2, K>4   N: regular   G: none   DVT ppx: heparin subQ   Code: FULL CODE   Dispo: MICU  69M PMH (per chart review) EtOH use disorder, HTN, hypothyroid, ?schizoaffective disorder, CAD, ?CKD, diabetes who was BIBEMS to [facility] from shelter for SOB, cough, fevers, and malaise, started on pressors and admitted to [facility] MICU for septic shock 2/2 influenza.    NEURO   #H/o schizoaffective disorder   #H/o EtOH use disorder   Previously prescribed buproprion, risperidone, clonazepam, gabapentin; has not recently been taking any medications.   Per chart review, was empirically treated for Wernicke’s encephalopathy in 2022 at [place].  - IV thiamine 500mg qd x3d  - folic acid, multivitamin qd  - f/u UTox   - CIWA protocol    CV   #Hypotension 2/2 septic shock   #h/o HTN   Per Surescripts, has previously been prescribed lisinopril, HCTZ, and lasix but patient has never filled.   s/p 3L fluids in [facility] ED, remained hypotensive sBP 80s, MAP low 60.   - Wean levo as tolerated, goal MAP >65     #Elevated troponin   #Elevated pro-BNP   Suspect 2/2 demand ischemia iso septic shock and SHIRA. EKG on admission without signs of acute infarct.   Pro-BNP 3k on admission.   - Trend to troponin peak   - f/u EKG   - f/u TTE     #CAD   Previously prescribed atorvastatin 20mg qhs.     PULM   #Influenza PNA   #C/F superimposed bacterial PNA   On 2L NC, wean as tolerated, SpO2 goal >92%   Rest of plan as below    GI   Diet: regular   Bowel reg: miralax, senna   PPI: none     RENAL/   #SHIRA on ?CKD   Cr 1.3-1.4 in 2022. P/w Cr 2.24, suspect pre-renal iso septic shock and decreased po intake and shock. UA spec grav elevated 1.046.    - f/u urine studies   - CTM Cr + lytes     ID   #Septic shock   Patient met sepsis criteria on admission (HR >90, RR >20). RVP +influenza A. UA neg for UTI. Lactate elevated to 4.6. Hypotension refractory to 3L fluids.    Lactate cleared s/p 3L fluids.   - c/w tamiflu x5d (1/12 - 1/16)   - c/w CTX 2g qd and azithromycin 500mg qd (1/12 - ) (due to c/f superimposed bacterial PNA)   - f/u BCx x2 drawn on 1/12   - f/u urine strep + Legionella   - MRSA nares   - collect sputum cx if able to produce     ENDO   #Diabetes   Prescribed metformin outpatient, has not been filled.   - f/u A1c    #?H/o Hypothyroidism   Previously prescribed Synthroid 25mcg qd, has never been filled.   - f/u TSH      HEME   SUSANNE     PPX   F: s/p 3L   E: Mg >2, K>4   N: regular   G: none   DVT ppx: heparin subQ   Code: FULL CODE   Dispo: MICU  69M PMH (per chart review) EtOH use disorder, depression, HTN, hypothyroid, CAD, ?CKD, diabetes who was BIBEMS to [facility] from shelter for SOB, cough, fevers, and malaise, started on pressors and admitted to [facility] MICU for septic shock 2/2 influenza.    NEURO   AOx3     #MDD  #?h/o schizoaffective disorder  Previously prescribed buproprion, risperidone, clonazepam, gabapentin; has not recently been taking any medications.   Home med: Wellbutrin SR 200mg BID  - c/w buproprion 300mg qd (therapeutic interchange for wellbutrin 150mg BID)     #H/o EtOH use disorder   ~2 drinks per week. Last drink 1/10. Per chart review, was empirically treated for Wernicke’s encephalopathy in 2022 at [place].  - IV thiamine 500mg qd x3d  - folic acid, multivitamin qd  - f/u UTox, BAL  - CIWA protocol    CV   #Hypotension 2/2 septic shock   #h/o HTN and HLD  Per Surescripts, has previously been prescribed lisinopril/HCTZ 20/12.5mg qd, lasix, and atorvastatin but patient has never filled.   s/p 3L fluids in [facility] ED, remained hypotensive sBP 80s, MAP low 60.   - Wean levo as tolerated, goal MAP >65     #Elevated troponin   #Elevated pro-BNP   Suspect 2/2 demand ischemia iso septic shock and SHIRA. EKG on presentation without signs of acute infarct.   Pro-BNP 3k on admission. Repeat EKG on admission shows sinus bradycardia without evidence of ischemia. QTc 414  - Trend troponin to peak   - f/u pro-BNP  - f/u TTE     PULM   #Influenza PNA  - On 2L NC, wean as tolerated, SpO2 goal >92%   - Duonebs q6h standing  - Rest of plan as below    GI   Diet: regular   Bowel reg: miralax, senna   PPI: none     RENAL/   #SHIRA on ?CKD   Cr 1.3-1.4 in 2022. P/w Cr 2.24, suspect pre-renal iso septic shock and decreased po intake and shock. UA spec grav elevated 1.046.    - f/u urine studies   - CTM Cr + lytes     ID   #Septic shock   Patient met sepsis criteria on admission (HR >90, RR >20). RVP +influenza A. UA neg for UTI. Lactate elevated to 4.6. Hypotension refractory to 3L fluids.    Lactate cleared s/p 3L fluids. s/p CTX 1g and azithromycin in ED.   - c/w tamiflu 30mg q24h x5d (1/12 - 1/16) - dose adjusted for CrCl  - f/u procal   - f/u BCx x2 drawn on 1/12   - f/u urine strep + Legionella   - MRSA nares   - collect sputum cx if able to produce     ENDO   #Diabetes   Prescribed metformin outpatient, has not been filled.   - f/u A1c    #?H/o Hypothyroidism   Previously prescribed Synthroid 25mcg qd, has never been filled.   - f/u TSH      HEME   SUSANNE     PPX   F: s/p 3L   E: Mg >2, K>4   N: regular   G: none   DVT ppx: heparin subQ   Code: FULL CODE   Dispo: MICU  69M PMH (per chart review) EtOH use disorder, depression, HTN, hypothyroid, CAD, ?CKD, diabetes who was BIBEMS to [facility] from shelter for SOB, cough, fevers, and malaise, started on pressors and admitted to [facility] MICU for septic shock 2/2 influenza.    NEURO   AOx3     #MDD  #?h/o schizoaffective disorder  Previously prescribed buproprion, risperidone, clonazepam, gabapentin; has not recently been taking any medications.   Home med: Wellbutrin SR 200mg BID  - c/w buproprion 300mg qd (therapeutic interchange for wellbutrin 150mg BID)     #H/o EtOH use disorder   ~2 drinks per week, Last drink 1/10. Per chart review, was empirically treated for Wernicke’s encephalopathy in 2022 at [place].  - IV thiamine 500mg qd x3d  - folic acid, multivitamin qd  - f/u UTox, BAL  - CIWA protocol    CV   #Hypotension 2/2 septic shock   #h/o HTN and HLD  Per Surescripts, has previously been prescribed lisinopril/HCTZ 20/12.5mg qd, lasix, and atorvastatin but patient has never filled.   s/p 3L fluids in [facility] ED, remained hypotensive sBP 80s, MAP low 60.   - Wean levo as tolerated, goal MAP >65     #Elevated troponin   #Elevated pro-BNP   Suspect 2/2 demand ischemia iso septic shock and SHIRA. EKG on presentation without signs of acute infarct.   Pro-BNP 3.5k on admission. Repeat EKG on admission shows sinus bradycardia without evidence of ischemia. QTc 414  - Trend troponin to peak   - f/u pro-BNP  - f/u TTE     PULM   #Influenza PNA  CTA: negative for PE but showed bilateral lower lobe mucus plugging as well as bronchial wall thickening.   - On 2L NC, wean as tolerated, SpO2 goal >94%   - Duonebs q6h standing  - chest PT BID, incentive spirometry  - Rest of plan as below    GI   Diet: regular   Bowel reg: miralax, senna   PPI: none     #Transaminitis  , ALT 89. Ddx hypoperfusion iso septic shock vs alcohol hepatitis  - CTM  - consider RUQ if LFTs worsening    RENAL/   #SHIRA on ?CKD   Cr 1.3-1.4 in 2022. P/w Cr 2.24, suspect pre-renal iso septic shock and decreased po intake. UA spec grav elevated 1.046.    - f/u urine studies   - CTM Cr + lytes     ID   #Septic shock   Patient met sepsis criteria on admission (HR >90, RR >20). RVP +influenza A. UA neg for UTI. Lactate elevated to 4.6. Hypotension refractory to 3L fluids.    Lactate cleared s/p 3L fluids. s/p CTX 1g and azithromycin in ED.   - c/w tamiflu 30mg q24h x5d (1/12 - 1/16) - dose adjusted for CrCl  - f/u procal - if elevated, consider resuming CTX/azithro  - f/u BCx x2 drawn on 1/12   - f/u urine strep + Legionella   - MRSA nares   - collect sputum cx if able to produce     ENDO   #Diabetes   Prescribed metformin outpatient, has not been filled.   - f/u A1c    #?H/o Hypothyroidism   Previously prescribed Synthroid 25mcg qd, has never been filled.   - f/u TSH      HEME   SUSANNE     PPX   F: s/p 3L   E: Mg >2, K>4   N: regular   G: none   DVT ppx: heparin subQ   Code: FULL CODE   Dispo: MICU  69M PMH (per chart review) EtOH use disorder, depression, HTN, hypothyroid, CAD, ?CKD, diabetes who was BIBEMS to [facility] from shelter for SOB, cough, fevers, and malaise, started on pressors and admitted to [facility] MICU for septic shock 2/2 influenza.    NEURO   AOx3     #MDD  #?h/o schizoaffective disorder  Previously prescribed buproprion, risperidone, clonazepam, gabapentin; has not recently been taking any medications.   Home med: Wellbutrin SR 200mg BID  - c/w buproprion 300mg qd (therapeutic interchange for wellbutrin 150mg BID)     #H/o EtOH use disorder   ~2 drinks per week, Last drink 1/10. Per chart review, was empirically treated for Wernicke’s encephalopathy in 2022 at [place].  - IV thiamine 500mg qd x3d  - folic acid, multivitamin qd  - f/u UTox, BAL  - CIWA protocol    CV   #Hypotension 2/2 septic shock   #h/o HTN and HLD  Per Surescripts, has previously been prescribed lisinopril/HCTZ 20/12.5mg qd, lasix, and atorvastatin but patient has never filled.   s/p 3L fluids in [facility] ED, remained hypotensive sBP 80s, MAP low 60.   - Wean levo as tolerated, goal MAP >65     #Elevated troponin   #Elevated pro-BNP   Suspect 2/2 demand ischemia iso septic shock and SHIRA. EKG on presentation without signs of acute infarct.   Pro-BNP 3.5k on admission. Repeat EKG on admission shows sinus bradycardia without evidence of ischemia. QTc 414  - Trend troponin to peak   - f/u pro-BNP  - f/u TTE     PULM   #Influenza PNA  CTA: negative for PE but showed bilateral lower lobe mucus plugging as well as bronchial wall thickening.   - On 2L NC, wean as tolerated, SpO2 goal >94%   - Duonebs q6h standing  - chest PT BID, incentive spirometry  - Rest of plan as below    GI   Diet: regular   Bowel reg: miralax, senna   PPI: none     #Transaminitis  , ALT 89. Ddx hypoperfusion iso septic shock vs alcohol hepatitis  - CTM  - consider RUQ if LFTs worsening    RENAL/   #SHIRA on ?CKD   Cr 1.3-1.4 in 2022. P/w Cr 2.24, suspect pre-renal iso septic shock and decreased po intake. UA spec grav elevated 1.046.    - f/u urine studies   - CTM Cr + lytes     ID   #Septic shock   Patient met sepsis criteria on admission (HR >90, RR >20). RVP +influenza A. UA neg for UTI. Lactate elevated to 4.6. Hypotension refractory to 3L fluids.    Lactate cleared s/p 3L fluids. s/p CTX 1g and azithromycin in ED.   - c/w tamiflu 30mg q24h x5d (1/12 - 1/16) - dose adjusted for CrCl  - f/u procal - if elevated, consider resuming CTX/azithro  - f/u BCx x2 drawn on 1/12   - f/u urine strep + Legionella   - MRSA nares   - collect sputum cx if able to produce     ENDO   #?H/o Hypothyroidism   Previously prescribed Synthroid 25mcg qd, has never been filled.   - f/u TSH      #Pre-diabetes  Prescribed metformin outpatient, has not been filled.   A1c 6.4.    HEME   SUSANNE     PPX   F: s/p 3L   E: Mg >2, K>4   N: regular   G: none   DVT ppx: heparin subQ   Code: FULL CODE   Dispo: MICU  69M PMH (per chart review) EtOH use disorder, depression, HTN, hypothyroid, CAD, ?CKD, diabetes who was BIBEMS to [facility] from shelter for SOB, cough, fevers, and malaise, started on pressors and admitted to [facility] MICU for septic shock 2/2 influenza.    NEURO   AOx3     #MDD  #?h/o schizoaffective disorder  Previously prescribed buproprion, risperidone, clonazepam, gabapentin; has not recently been taking any medications.   Home med: Wellbutrin SR 200mg BID  - c/w buproprion 300mg qd (therapeutic interchange for wellbutrin 150mg BID)     #H/o EtOH use disorder   ~2 drinks per week, Last drink 1/10. Per chart review, was empirically treated for Wernicke’s encephalopathy in 2022 at [place].  - IV thiamine 500mg qd x3d  - folic acid, multivitamin qd  - f/u UTox, BAL  - CIWA protocol    CV   #Hypotension 2/2 septic shock   #h/o HTN and HLD  Per Surescripts, has previously been prescribed lisinopril/HCTZ 20/12.5mg qd, lasix, and atorvastatin but patient has never filled.   s/p 3L fluids in [facility] ED, remained hypotensive sBP 80s, MAP low 60.   - Wean levo as tolerated, goal MAP >65     #Elevated troponin   #Elevated pro-BNP   Suspect 2/2 demand ischemia iso septic shock and SHIRA. EKG on presentation without signs of acute infarct.   Pro-BNP 3.5k on admission. Repeat EKG on admission shows sinus bradycardia without evidence of ischemia. QTc 414  - Trend troponin to peak   - f/u pro-BNP  - f/u TTE     PULM   #Influenza PNA  CTA: negative for PE but showed bilateral lower lobe mucus plugging as well as bronchial wall thickening.   - On 2L NC, wean as tolerated, SpO2 goal >94%   - Duonebs q6h standing  - chest PT BID, incentive spirometry  - Rest of plan as below    GI   Diet: regular   Bowel reg: miralax, senna   PPI: none     #Transaminitis  , ALT 89. Ddx hypoperfusion iso septic shock vs alcohol hepatitis  - CTM  - consider RUQ if LFTs worsening    RENAL/   #SHIRA on ?CKD   Cr 1.3-1.4 in 2022. P/w Cr 2.24, suspect pre-renal iso septic shock and decreased po intake. UA spec grav elevated 1.046.    - f/u urine studies   - CTM Cr + lytes     ID   #Septic shock   Patient met sepsis criteria on admission (HR >90, RR >20). RVP +influenza A. UA neg for UTI. Lactate elevated to 4.6. Hypotension refractory to 3L fluids.    Lactate cleared s/p 3L fluids. s/p CTX 1g and azithromycin in ED.   - c/w tamiflu 30mg q24h x5d (1/12 - 1/16) - dose adjusted for CrCl  - f/u procal - if elevated, consider resuming CTX/azithro  - f/u BCx x2 drawn on 1/12   - f/u urine strep + Legionella   - MRSA nares   - collect sputum cx if able to produce     ENDO   #?H/o Hypothyroidism   Previously prescribed Synthroid 25mcg qd, has never been filled.   - f/u TSH      #Pre-diabetes  Prescribed metformin outpatient, has not been filled.   A1c 6.4.  SUSANNE    HEME   #Anemia  Hgb 10.3 s/p 3L fluid resuscitation. Possible h/o pernicious anemia per [facility] chart review.   - f/u b12, folate, iron panel in AM  - folic acid and MV as above    MSK  - PT consult    PPX   F: s/p 3L   E: Mg >2, K>4   N: regular   G: none   DVT ppx: heparin subQ   Dispo: MICU  69M PMH (per chart review) EtOH use disorder, depression, HTN, hypothyroid, CAD, ?CKD, diabetes who was BIBEMS to [facility] from shelter for SOB, cough, fevers, and malaise, started on pressors and admitted to [facility] MICU for septic shock 2/2 influenza.    NEURO   AOx3     #MDD  #?h/o schizoaffective disorder  Psychiatrist Dr. Corrales. Per chart review, has previously been prescribed Wellbutrin, risperidone, clonazepam, gabapentin; only takes Wellbutrin.  Home med: Wellbutrin SR 200mg BID  - c/w buproprion 300mg qd (therapeutic interchange for wellbutrin 150mg BID)     #H/o EtOH use disorder   ~2 drinks per week, Last drink 1/10. Per chart review, was empirically treated for Wernicke’s encephalopathy in 2022 at [place].  - IV thiamine 500mg qd x3d  - folic acid, multivitamin qd  - f/u UTox, BAL  - CIWA protocol    CV   #Hypotension 2/2 septic shock   #Sinus bradycardia  #?h/o HTN and HLD  Per Surescripts, has previously been prescribed lisinopril/HCTZ 20/12.5mg qd, lasix, and atorvastatin but patient is not currently taking/has never filled.  s/p 3L fluids in [facility] ED, remained hypotensive sBP 80s, MAP low 60. TSH wnl.   - Wean levo as tolerated, goal MAP >65   - atropine at bedside    #Elevated troponin   #Elevated pro-BNP   Suspect 2/2 demand ischemia iso septic shock and SHIRA. EKG on presentation without signs of acute infarct.   Pro-BNP 3.5k on admission. Repeat EKG on admission shows sinus bradycardia without evidence of ischemia. QTc 414  - Trend troponin to peak   - f/u pro-BNP  - f/u TTE     PULM   #Influenza PNA  CTA: negative for PE but showed bilateral lower lobe mucus plugging as well as bronchial wall thickening.   - On 2L NC, wean as tolerated, SpO2 goal >94%   - Duonebs q4h standing  - chest PT BID, incentive spirometry  - Rest of plan as below    GI   Diet: regular   Bowel reg: miralax, senna   PPI: none     #Transaminitis  , ALT 89. Ddx hypoperfusion iso septic shock vs alcohol hepatitis  - CTM  - consider RUQ if LFTs worsening    RENAL/   #SHIRA on ?CKD   Cr 1.3-1.4 in 2022. P/w Cr 2.24, suspect pre-renal iso septic shock and decreased po intake. UA spec grav elevated 1.046.    - f/u urine studies   - CTM Cr + lytes     ID   #Septic shock   Patient met sepsis criteria on admission (HR >90, RR >20). RVP +influenza A. UA neg for UTI. Lactate elevated to 4.6. Hypotension refractory to 3L fluids.    Lactate cleared s/p 3L fluids. s/p CTX 1g and azithromycin in ED. Procal (after receiving abx) 1.05.  - c/w tamiflu 30mg q24h x5d (1/12 - 1/16) - dose adjusted for CrCl  - CTM off antibiotics  - f/u BCx x2 drawn on 1/12   - f/u urine strep + Legionella, MRSA nares  - collect sputum cx if able to produce     ENDO   #Pre-diabetes  Prescribed metformin outpatient, has not been filled.   A1c 6.4.  SUSANNE    #R/o Hypothyroidism   Previously prescribed Synthroid 25mcg qd, has never been filled. TSH wnl.  SUSANNE    HEME   #Anemia  Hgb 10.3 s/p 3L fluid resuscitation. Possible h/o pernicious anemia per [facility] chart review.   - f/u b12, folate, iron panel in AM  - folic acid and MV as above    MSK  - PT consult    PPX   F: s/p 3L   E: Mg >2, K>4   N: regular   G: none   DVT ppx: heparin subQ   Dispo: MICU

## 2025-01-13 NOTE — DIETITIAN INITIAL EVALUATION ADULT - OTHER INFO
69M PMH (per chart review) EtOH use disorder, depression, HTN, hypothyroid, CAD, ?CKD, diabetes who was BIBEMS to [facility] from shelter for SOB, cough, fevers, and malaise, started on pressors and admitted to [facility] MICU for septic shock 2/2 influenza.    Pt care discussed in interdisciplinary care team rounds. Rx and labs reviewed. Pt presents with moderate wasting of the orbital, temporal, and clavicular regions. Pt received resting in bed, alert and participatory in nutrition assessment. Pt reports a good appetite and reflective PO intake; eating lunch at time of assessment and tray >50% consumed. Notes a usual body weight of 150 pounds; 130 pounds. Pt supports this wt loss noting ~15lbs wt loss over 3 weeks in setting of poor PO intake (13.3%BW). Describes intake of <75% for about 1 month PTA. Pt meets ASPEN criteria for severe, chronic protein-calorie malnutrition in setting of significant muscle/fat wasting, unintentional wt loss, and inadequate intake. Discussed current diet therapy and rationale; encouraged good PO intake and emphasis on protein- pt agreeable and verbalized understanding. Agreeable to oral nutrition supplement regimen. No complaints of nausea/vomiting/constipation/diarrhea or difficult chew/swallow. NKA to food. RDN to remain available, see recommendations below.    Pain: 0 per chart review   GI: Abdomen non-distended/non-tender, +BS x4, last bowel movement 1/13  Skin: Warm/Dry/Intact, +1 generalized edema

## 2025-01-14 LAB
ALBUMIN SERPL ELPH-MCNC: 3.1 G/DL — LOW (ref 3.3–5)
ALP SERPL-CCNC: 64 U/L — SIGNIFICANT CHANGE UP (ref 40–120)
ALT FLD-CCNC: 58 U/L — HIGH (ref 10–45)
ANION GAP SERPL CALC-SCNC: 11 MMOL/L — SIGNIFICANT CHANGE UP (ref 5–17)
ANION GAP SERPL CALC-SCNC: 12 MMOL/L — SIGNIFICANT CHANGE UP (ref 5–17)
APPEARANCE UR: CLEAR — SIGNIFICANT CHANGE UP
AST SERPL-CCNC: 72 U/L — HIGH (ref 10–40)
BASOPHILS # BLD AUTO: 0.02 K/UL — SIGNIFICANT CHANGE UP (ref 0–0.2)
BASOPHILS # BLD AUTO: 0.03 K/UL — SIGNIFICANT CHANGE UP (ref 0–0.2)
BASOPHILS NFR BLD AUTO: 0.3 % — SIGNIFICANT CHANGE UP (ref 0–2)
BASOPHILS NFR BLD AUTO: 0.5 % — SIGNIFICANT CHANGE UP (ref 0–2)
BILIRUB SERPL-MCNC: 0.2 MG/DL — SIGNIFICANT CHANGE UP (ref 0.2–1.2)
BILIRUB UR-MCNC: NEGATIVE — SIGNIFICANT CHANGE UP
BUN SERPL-MCNC: 12 MG/DL — SIGNIFICANT CHANGE UP (ref 7–23)
BUN SERPL-MCNC: 13 MG/DL — SIGNIFICANT CHANGE UP (ref 7–23)
CALCIUM SERPL-MCNC: 7.7 MG/DL — LOW (ref 8.4–10.5)
CALCIUM SERPL-MCNC: 7.8 MG/DL — LOW (ref 8.4–10.5)
CHLORIDE SERPL-SCNC: 103 MMOL/L — SIGNIFICANT CHANGE UP (ref 96–108)
CHLORIDE SERPL-SCNC: 99 MMOL/L — SIGNIFICANT CHANGE UP (ref 96–108)
CHOLEST SERPL-MCNC: 85 MG/DL — SIGNIFICANT CHANGE UP
CK SERPL-CCNC: 901 U/L — HIGH (ref 30–200)
CO2 SERPL-SCNC: 17 MMOL/L — LOW (ref 22–31)
CO2 SERPL-SCNC: 22 MMOL/L — SIGNIFICANT CHANGE UP (ref 22–31)
COLOR SPEC: YELLOW — SIGNIFICANT CHANGE UP
CREAT ?TM UR-MCNC: 73 MG/DL — SIGNIFICANT CHANGE UP
CREAT SERPL-MCNC: 1.08 MG/DL — SIGNIFICANT CHANGE UP (ref 0.5–1.3)
CREAT SERPL-MCNC: 1.2 MG/DL — SIGNIFICANT CHANGE UP (ref 0.5–1.3)
DIFF PNL FLD: NEGATIVE — SIGNIFICANT CHANGE UP
EGFR: 65 ML/MIN/1.73M2 — SIGNIFICANT CHANGE UP
EGFR: 74 ML/MIN/1.73M2 — SIGNIFICANT CHANGE UP
EOSINOPHIL # BLD AUTO: 0.06 K/UL — SIGNIFICANT CHANGE UP (ref 0–0.5)
EOSINOPHIL # BLD AUTO: 0.08 K/UL — SIGNIFICANT CHANGE UP (ref 0–0.5)
EOSINOPHIL NFR BLD AUTO: 1 % — SIGNIFICANT CHANGE UP (ref 0–6)
EOSINOPHIL NFR BLD AUTO: 1.3 % — SIGNIFICANT CHANGE UP (ref 0–6)
GLUCOSE SERPL-MCNC: 103 MG/DL — HIGH (ref 70–99)
GLUCOSE SERPL-MCNC: 106 MG/DL — HIGH (ref 70–99)
GLUCOSE UR QL: NEGATIVE MG/DL — SIGNIFICANT CHANGE UP
HAV IGM SER-ACNC: SIGNIFICANT CHANGE UP
HBV CORE AB SER-ACNC: SIGNIFICANT CHANGE UP
HBV CORE IGM SER-ACNC: SIGNIFICANT CHANGE UP
HBV SURFACE AB SER-ACNC: ABNORMAL
HBV SURFACE AG SER-ACNC: SIGNIFICANT CHANGE UP
HCT VFR BLD CALC: 33.7 % — LOW (ref 39–50)
HCT VFR BLD CALC: 34.4 % — LOW (ref 39–50)
HCV AB S/CO SERPL IA: 0.05 S/CO — SIGNIFICANT CHANGE UP
HCV AB SERPL-IMP: SIGNIFICANT CHANGE UP
HDLC SERPL-MCNC: 31 MG/DL — LOW
HGB BLD-MCNC: 11.2 G/DL — LOW (ref 13–17)
HGB BLD-MCNC: 11.7 G/DL — LOW (ref 13–17)
IMM GRANULOCYTES NFR BLD AUTO: 0.2 % — SIGNIFICANT CHANGE UP (ref 0–0.9)
IMM GRANULOCYTES NFR BLD AUTO: 0.3 % — SIGNIFICANT CHANGE UP (ref 0–0.9)
KETONES UR-MCNC: NEGATIVE MG/DL — SIGNIFICANT CHANGE UP
LACTATE SERPL-SCNC: 2 MMOL/L — SIGNIFICANT CHANGE UP (ref 0.5–2)
LEUKOCYTE ESTERASE UR-ACNC: NEGATIVE — SIGNIFICANT CHANGE UP
LIPID PNL WITH DIRECT LDL SERPL: 36 MG/DL — SIGNIFICANT CHANGE UP
LYMPHOCYTES # BLD AUTO: 2.47 K/UL — SIGNIFICANT CHANGE UP (ref 1–3.3)
LYMPHOCYTES # BLD AUTO: 2.71 K/UL — SIGNIFICANT CHANGE UP (ref 1–3.3)
LYMPHOCYTES # BLD AUTO: 40.6 % — SIGNIFICANT CHANGE UP (ref 13–44)
LYMPHOCYTES # BLD AUTO: 44.1 % — HIGH (ref 13–44)
MAGNESIUM SERPL-MCNC: 1.9 MG/DL — SIGNIFICANT CHANGE UP (ref 1.6–2.6)
MAGNESIUM SERPL-MCNC: 2 MG/DL — SIGNIFICANT CHANGE UP (ref 1.6–2.6)
MCHC RBC-ENTMCNC: 32.2 PG — SIGNIFICANT CHANGE UP (ref 27–34)
MCHC RBC-ENTMCNC: 32.3 PG — SIGNIFICANT CHANGE UP (ref 27–34)
MCHC RBC-ENTMCNC: 33.2 G/DL — SIGNIFICANT CHANGE UP (ref 32–36)
MCHC RBC-ENTMCNC: 34 G/DL — SIGNIFICANT CHANGE UP (ref 32–36)
MCV RBC AUTO: 94.8 FL — SIGNIFICANT CHANGE UP (ref 80–100)
MCV RBC AUTO: 97.1 FL — SIGNIFICANT CHANGE UP (ref 80–100)
MONOCYTES # BLD AUTO: 0.44 K/UL — SIGNIFICANT CHANGE UP (ref 0–0.9)
MONOCYTES # BLD AUTO: 0.58 K/UL — SIGNIFICANT CHANGE UP (ref 0–0.9)
MONOCYTES NFR BLD AUTO: 7.2 % — SIGNIFICANT CHANGE UP (ref 2–14)
MONOCYTES NFR BLD AUTO: 9.4 % — SIGNIFICANT CHANGE UP (ref 2–14)
NEUTROPHILS # BLD AUTO: 2.77 K/UL — SIGNIFICANT CHANGE UP (ref 1.8–7.4)
NEUTROPHILS # BLD AUTO: 3.05 K/UL — SIGNIFICANT CHANGE UP (ref 1.8–7.4)
NEUTROPHILS NFR BLD AUTO: 45 % — SIGNIFICANT CHANGE UP (ref 43–77)
NEUTROPHILS NFR BLD AUTO: 50.1 % — SIGNIFICANT CHANGE UP (ref 43–77)
NITRITE UR-MCNC: NEGATIVE — SIGNIFICANT CHANGE UP
NON HDL CHOLESTEROL: 54 MG/DL — SIGNIFICANT CHANGE UP
NRBC # BLD: 0 /100 WBCS — SIGNIFICANT CHANGE UP (ref 0–0)
NRBC # BLD: 0 /100 WBCS — SIGNIFICANT CHANGE UP (ref 0–0)
NRBC BLD-RTO: 0 /100 WBCS — SIGNIFICANT CHANGE UP (ref 0–0)
NRBC BLD-RTO: 0 /100 WBCS — SIGNIFICANT CHANGE UP (ref 0–0)
OSMOLALITY UR: 292 MOSM/KG — LOW (ref 300–900)
PH UR: 5.5 — SIGNIFICANT CHANGE UP (ref 5–8)
PHOSPHATE SERPL-MCNC: 2.8 MG/DL — SIGNIFICANT CHANGE UP (ref 2.5–4.5)
PHOSPHATE SERPL-MCNC: 5 MG/DL — HIGH (ref 2.5–4.5)
PLATELET # BLD AUTO: 161 K/UL — SIGNIFICANT CHANGE UP (ref 150–400)
PLATELET # BLD AUTO: 209 K/UL — SIGNIFICANT CHANGE UP (ref 150–400)
POTASSIUM SERPL-MCNC: 3.8 MMOL/L — SIGNIFICANT CHANGE UP (ref 3.5–5.3)
POTASSIUM SERPL-MCNC: 4.6 MMOL/L — SIGNIFICANT CHANGE UP (ref 3.5–5.3)
POTASSIUM SERPL-SCNC: 3.8 MMOL/L — SIGNIFICANT CHANGE UP (ref 3.5–5.3)
POTASSIUM SERPL-SCNC: 4.6 MMOL/L — SIGNIFICANT CHANGE UP (ref 3.5–5.3)
POTASSIUM UR-SCNC: 23 MMOL/L — SIGNIFICANT CHANGE UP
PROT ?TM UR-MCNC: 22 MG/DL — HIGH (ref 0–12)
PROT SERPL-MCNC: 5.5 G/DL — LOW (ref 6–8.3)
PROT UR-MCNC: SIGNIFICANT CHANGE UP MG/DL
PROT/CREAT UR-RTO: 0.3 RATIO — HIGH (ref 0–0.2)
RBC # BLD: 3.47 M/UL — LOW (ref 4.2–5.8)
RBC # BLD: 3.63 M/UL — LOW (ref 4.2–5.8)
RBC # FLD: 13.6 % — SIGNIFICANT CHANGE UP (ref 10.3–14.5)
RBC # FLD: 13.8 % — SIGNIFICANT CHANGE UP (ref 10.3–14.5)
SODIUM SERPL-SCNC: 131 MMOL/L — LOW (ref 135–145)
SODIUM SERPL-SCNC: 133 MMOL/L — LOW (ref 135–145)
SODIUM UR-SCNC: 38 MMOL/L — SIGNIFICANT CHANGE UP
SP GR SPEC: 1.01 — SIGNIFICANT CHANGE UP (ref 1–1.03)
TRIGL SERPL-MCNC: 92 MG/DL — SIGNIFICANT CHANGE UP
UROBILINOGEN FLD QL: 0.2 MG/DL — SIGNIFICANT CHANGE UP (ref 0.2–1)
UUN UR-MCNC: 338 MG/DL — SIGNIFICANT CHANGE UP
WBC # BLD: 6.09 K/UL — SIGNIFICANT CHANGE UP (ref 3.8–10.5)
WBC # BLD: 6.15 K/UL — SIGNIFICANT CHANGE UP (ref 3.8–10.5)
WBC # FLD AUTO: 6.09 K/UL — SIGNIFICANT CHANGE UP (ref 3.8–10.5)
WBC # FLD AUTO: 6.15 K/UL — SIGNIFICANT CHANGE UP (ref 3.8–10.5)

## 2025-01-14 PROCEDURE — 71045 X-RAY EXAM CHEST 1 VIEW: CPT | Mod: 26

## 2025-01-14 PROCEDURE — 99222 1ST HOSP IP/OBS MODERATE 55: CPT

## 2025-01-14 PROCEDURE — 99233 SBSQ HOSP IP/OBS HIGH 50: CPT

## 2025-01-14 RX ORDER — MIDODRINE HYDROCHLORIDE 5 MG/1
5 TABLET ORAL EVERY 8 HOURS
Refills: 0 | Status: DISCONTINUED | OUTPATIENT
Start: 2025-01-14 | End: 2025-01-15

## 2025-01-14 RX ORDER — SODIUM CHLORIDE 9 G/ML
500 INJECTION, SOLUTION INTRAVENOUS ONCE
Refills: 0 | Status: COMPLETED | OUTPATIENT
Start: 2025-01-14 | End: 2025-01-14

## 2025-01-14 RX ORDER — POTASSIUM CHLORIDE 750 MG/1
20 TABLET, EXTENDED RELEASE ORAL ONCE
Refills: 0 | Status: COMPLETED | OUTPATIENT
Start: 2025-01-14 | End: 2025-01-14

## 2025-01-14 RX ORDER — SODIUM CHLORIDE 9 G/ML
500 INJECTION, SOLUTION INTRAVENOUS
Refills: 0 | Status: DISCONTINUED | OUTPATIENT
Start: 2025-01-14 | End: 2025-01-14

## 2025-01-14 RX ORDER — MAGNESIUM SULFATE 0.8 MEQ/ML
1 AMPUL (ML) INJECTION ONCE
Refills: 0 | Status: COMPLETED | OUTPATIENT
Start: 2025-01-14 | End: 2025-01-14

## 2025-01-14 RX ORDER — FLUDROCORTISONE ACETATE 0.1 MG/1
0.1 TABLET ORAL EVERY 24 HOURS
Refills: 0 | Status: DISCONTINUED | OUTPATIENT
Start: 2025-01-14 | End: 2025-01-16

## 2025-01-14 RX ADMIN — FLUDROCORTISONE ACETATE 0.1 MILLIGRAM(S): 0.1 TABLET ORAL at 21:24

## 2025-01-14 RX ADMIN — Medication 0.5 MILLIGRAM(S): at 21:24

## 2025-01-14 RX ADMIN — IPRATROPIUM BROMIDE AND ALBUTEROL SULFATE 3 MILLILITER(S): .5; 2.5 SOLUTION RESPIRATORY (INHALATION) at 09:41

## 2025-01-14 RX ADMIN — POLYETHYLENE GLYCOL 3350 17 GRAM(S): 17 POWDER, FOR SOLUTION ORAL at 11:07

## 2025-01-14 RX ADMIN — SODIUM CHLORIDE 500 MILLILITER(S): 9 INJECTION, SOLUTION INTRAVENOUS at 10:40

## 2025-01-14 RX ADMIN — Medication 1000 UNIT(S): at 11:11

## 2025-01-14 RX ADMIN — MIDODRINE HYDROCHLORIDE 5 MILLIGRAM(S): 5 TABLET ORAL at 21:24

## 2025-01-14 RX ADMIN — Medication 5000 UNIT(S): at 13:30

## 2025-01-14 RX ADMIN — IPRATROPIUM BROMIDE AND ALBUTEROL SULFATE 3 MILLILITER(S): .5; 2.5 SOLUTION RESPIRATORY (INHALATION) at 02:28

## 2025-01-14 RX ADMIN — MIDODRINE HYDROCHLORIDE 10 MILLIGRAM(S): 5 TABLET ORAL at 13:30

## 2025-01-14 RX ADMIN — Medication 105 MILLIGRAM(S): at 06:04

## 2025-01-14 RX ADMIN — Medication 100 GRAM(S): at 07:45

## 2025-01-14 RX ADMIN — OSELTAMIVIR PHOSPHATE 30 MILLIGRAM(S): 75 CAPSULE ORAL at 18:35

## 2025-01-14 RX ADMIN — Medication 5000 UNIT(S): at 21:25

## 2025-01-14 RX ADMIN — Medication 105 MILLIGRAM(S): at 21:25

## 2025-01-14 RX ADMIN — Medication 1 MILLIGRAM(S): at 11:11

## 2025-01-14 RX ADMIN — IPRATROPIUM BROMIDE AND ALBUTEROL SULFATE 3 MILLILITER(S): .5; 2.5 SOLUTION RESPIRATORY (INHALATION) at 22:37

## 2025-01-14 RX ADMIN — POTASSIUM CHLORIDE 20 MILLIEQUIVALENT(S): 750 TABLET, EXTENDED RELEASE ORAL at 08:22

## 2025-01-14 RX ADMIN — MIDODRINE HYDROCHLORIDE 10 MILLIGRAM(S): 5 TABLET ORAL at 06:03

## 2025-01-14 RX ADMIN — SODIUM CHLORIDE 1000 MILLILITER(S): 9 INJECTION, SOLUTION INTRAVENOUS at 11:35

## 2025-01-14 RX ADMIN — Medication 105 MILLIGRAM(S): at 13:30

## 2025-01-14 RX ADMIN — Medication 5000 UNIT(S): at 06:04

## 2025-01-14 RX ADMIN — ANTISEPTIC SURGICAL SCRUB 1 APPLICATION(S): 0.04 SOLUTION TOPICAL at 06:02

## 2025-01-14 RX ADMIN — LEVOTHYROXINE SODIUM 25 MICROGRAM(S): 25 TABLET ORAL at 06:03

## 2025-01-14 RX ADMIN — IPRATROPIUM BROMIDE AND ALBUTEROL SULFATE 3 MILLILITER(S): .5; 2.5 SOLUTION RESPIRATORY (INHALATION) at 06:12

## 2025-01-14 RX ADMIN — OSELTAMIVIR PHOSPHATE 30 MILLIGRAM(S): 75 CAPSULE ORAL at 06:03

## 2025-01-14 RX ADMIN — IPRATROPIUM BROMIDE AND ALBUTEROL SULFATE 3 MILLILITER(S): .5; 2.5 SOLUTION RESPIRATORY (INHALATION) at 17:25

## 2025-01-14 RX ADMIN — Medication 2 TABLET(S): at 21:25

## 2025-01-14 RX ADMIN — Medication 0.5 MILLIGRAM(S): at 21:25

## 2025-01-14 RX ADMIN — Medication 1 TABLET(S): at 11:11

## 2025-01-14 RX ADMIN — BUPROPION HYDROCHLORIDE 300 MILLIGRAM(S): 150 TABLET, EXTENDED RELEASE ORAL at 11:12

## 2025-01-14 RX ADMIN — IPRATROPIUM BROMIDE AND ALBUTEROL SULFATE 3 MILLILITER(S): .5; 2.5 SOLUTION RESPIRATORY (INHALATION) at 13:25

## 2025-01-14 NOTE — OCCUPATIONAL THERAPY INITIAL EVALUATION ADULT - ADDITIONAL COMMENTS
pt lives in an SRO, no stairs to enter. pt was indep PTA with ADLs and functional mobility +cane. pt states he has a hx of dizziness which may be related to ear problems however pt unclear at this time, pt endorses 1 fall in the last 6 months.

## 2025-01-14 NOTE — OCCUPATIONAL THERAPY INITIAL EVALUATION ADULT - GENERAL OBSERVATIONS, REHAB EVAL
Pt received seated in bedside chair +tele, in NAD and agreeable to OT. Cleared by LEVON Lara to see pt.

## 2025-01-14 NOTE — CONSULT NOTE ADULT - ASSESSMENT
69M PMH (per chart review) EtOH use disorder, HTN, ?hypothyroidism, ?schizoaffective disorder, CAD, ?CKD, diabetes who was BIBEMS to [facility] from shelter for SOB, cough, fevers, and malaise admitted to MICU for septic shock 2/2 influenza.  Neurology consulted for intermittent, positional dizziness w/ hearing loss/tinnitus. History and physical suggestive of peripheral etiology.     RECOMMENDATIONS:   69M PMH (per chart review) EtOH use disorder, HTN, ?hypothyroidism, ?schizoaffective disorder, CAD, ?CKD, diabetes who was BIBEMS to [facility] from shelter for SOB, cough, fevers, and malaise admitted to MICU for septic shock 2/2 influenza.  Neurology consulted for intermittent, positional dizziness w/ hearing loss/tinnitus. Orthostatics negative.     RECOMMENDATIONS:  Obtain MRI brain w/ and w/o IAC protocol  Obtain MRA head and neck    Discussed with Dr. Hargrove 69M PMH (per chart review) EtOH use disorder, HTN, ?hypothyroidism, ?schizoaffective disorder, CAD, ?CKD, diabetes who was BIBEMS to [facility] from shelter for SOB, cough, fevers, and malaise admitted to MICU for septic shock 2/2 influenza.  Neurology consulted for intermittent, positional dizziness only on standing w/ hearing loss/tinnitus. Orthostatics negative.     RECOMMENDATIONS:  Obtain MRI brain w/ and w/o IAC protocol  Obtain MRA head and neck    Discussed with Dr. Hargrove

## 2025-01-14 NOTE — OCCUPATIONAL THERAPY INITIAL EVALUATION ADULT - DIAGNOSIS, OT EVAL
pt admitted for SOB, found to have the flu. Pt presents with decreased strength, balance, endurance, impacting ability to perform ADL and mobility.

## 2025-01-14 NOTE — OCCUPATIONAL THERAPY INITIAL EVALUATION ADULT - PERTINENT HX OF CURRENT PROBLEM, REHAB EVAL
69 y.o male presenting by EMS from shelter with cough, SOB , malaise, fevers.   Pt. has been admitted to MICU for management of septic shock requiring pressure support i/s/o influenza A.

## 2025-01-14 NOTE — OCCUPATIONAL THERAPY INITIAL EVALUATION ADULT - MODIFIED CLINICAL TEST OF SENSORY INTEGRATION IN BALANCE TEST
Pt took ~6 forward steps with Chelsie +Rw +chair follow, pt c/o of fatigue and deferred further mobility

## 2025-01-14 NOTE — PROGRESS NOTE ADULT - ASSESSMENT
******INCOMPLETE NOTE*****     69M PMH (per chart review) EtOH use disorder, depression, HTN, hypothyroid, CAD, ?CKD, diabetes who was BIBEMS to [facility] from shelter for SOB, cough, fevers, and malaise, started on pressors and admitted to [facility] MICU for septic shock 2/2 influenza.    NEURO   #MDD  #?h/o schizoaffective disorder  Psychiatrist Dr. Corrales. Per chart review, has previously been prescribed Wellbutrin, risperidone, clonazepam, gabapentin; reports only taking Wellbutrin.   Home med: Wellbutrin SR 200mg BID, iSTOP with clonazepam filled montly last filled 12/18 and per discussion today with patient's PCP he has most recently been rx'd risperidone, clonazepam, wellbutin (per the  shop last filled on 12/19/24)  - c/w buproprion 300mg qd (therapeutic interchange for wellbutrin 150mg BID)   - Start risperidone 0.5mg HS  - Start clonazepam 0.5mg HS    #H/o Alcohol use disorder   ~2 drinks per week, Last drink 1/10. Per chart review, was empirically treated for Wernicke’s encephalopathy in 2022 at [place]. Discussed with mary's PCP about AUD and she states he is not forthcoming about his alcohol intake and quantity and frequency is not clear.   - IV thiamine 500mg qd x3d  - start folic acid, multivitamin qd  - utox unable to be run x 2 due to acidic urine  - CIWA protocol will consider phenobarb protocol if CIWA > 8     #Chronic Dizziness and unsteady gait  Patient c/o dizziness x 2 weeks. Per patient's PCP he has been chronically been c/o dizziness x months and has been having gait difficulties  - F/u [facility]  - Will consider Neurology consult   - Start meclizine PRN   - c/w high dose thiamine  - PT/OT consult     CV   #Septic Shock  #Sinus bradycardia; Resolved  #h/o HTN and HLD  Per Surescripts, has previously been prescribed lisinopril/HCTZ 20/12.5mg qd, lasix, and atorvastatin but patient is not currently taking/has never filled.  s/p 3L fluids in [facility] ED, remained hypotensive sBP 80s, MAP low 60. TSH wnl.   - Wean levo as tolerated, goal MAP >65   - Midodrine 5mg q8 to wean vasopressors   - Watch HR closesly w initiation of midodrine given h/o bradycardia  - atropine at bedside    #Elevated troponin   #Elevated pro-BNP   Suspect 2/2 demand ischemia iso septic shock and SHIRA. EKG on presentation without signs of acute infarct.   Pro-BNP 3.5k on admission. Repeat EKG on admission shows sinus bradycardia without evidence of ischemia. QTc 414  - Troponin peaked @ 51   - TTE 1/13 normal systolic function    PULMONOLOGY  #Acute hypoxic respiratory failure  #Influenza PNA  CTA: negative for PE but showed bilateral lower lobe mucus plugging as well as bronchial wall thickening.   - On 2L NC, wean as tolerated, SpO2 goal >94%   - Duoneb q4h standing  - chest PT BID, incentive spirometry, aerobika   - Rest of plan as below    GI   #Transaminitis  , ALT 89. Ddx hypoperfusion iso septic shock vs alcohol hepatitis  - Trend daily  - consider RUQ if LFTs worsening  - Hepatitis panel w AM labs     RENAL/   #SHIRA  likely prerenal 2/2 hypovolemia  Cr 1.3-1.4 in 2022 and per PCP 1.27 in August 2024. P/w Cr 2.24, suspect pre-renal iso septic shock and decreased po intake. UA spec grav elevated 1.046, CK 1026, FeNa 0.9%   - BMP daily     #Mild Hyponatremia(132) possibly 2/2 SIADH 2/2 influenza; appears euvolemic on exam  -Evening BMP   -If Na continues to downtrend will consider fluid restrictions    INFECTIOUS DISEASE  #Septic shock   Patient met sepsis criteria on admission (HR >90, RR >20). RVP +influenza A. UA neg for UTI. Lactate elevated to 4.6. Hypotension refractory to 3L fluids.    Lactate cleared s/p 3L fluids. s/p CTX 1g and azithromycin in ED. Procal (after receiving abx) 1.05.  - c/w tamiflu 30mg q24h x5d (1/12 - 1/16) - dose adjusted for CrCl  - Will keep off abx for now if persistently febrile or if worsening shock will start CTX 2g/Azithro 500mg for CAP coverage  - f/u BCx x2 drawn on 1/12   - Urine strep/legionella negative  - f/u MRSA  - collect sputum cx if able to produce     ENDO   #Pre-diabetes  Prescribed metformin outpatient, has not been filled. A1c 6.4.  -SSI w premeal finger sticks    #R/o Hypothyroidism   Previously prescribed Synthroid 25mcg qd filled 12/12/25 per the  shop   - TSH 1.18 T4 4.52  - Start home dose of levothyroxine 25mcg    HEME   #Anemia likely anemia of chronic disease or from sepsis   Hgb 10.3 s/p 3L fluid resuscitation. Possible h/o pernicious anemia per [facility] chart review.   - Folate 6.0, B12 596  - folic acid and MV as above  - Maintain active T&S  - Daily CBC    MSK  #Unsteady gait   - PT/OT consult    Maintainence  F: s/p 3L  E: Replete K>4 Mg>2 Phos>3  D: Regular  GI PPx: Not indicated  VTE PPx: SQH  Code status: Full  Emergency Contact: Not file  Dispo: [facility]  c: [phone]  p: [phone]   69M PMH (per chart review) EtOH use disorder, depression, former smoker (20 pack year quit 15 years ago), HTN, hypothyroid, CAD, diabetes who was BIBEMS to [facility] from shelter for SOB, cough, fevers, and malaise. Transferred to MICU for septic shock 2/2 influenza now off pressors and weaned to nasal cannula.     NEURO   #MDD  #h/o schizoaffective disorder  Psychiatrist Dr. Corrales. Per chart review, has previously been prescribed Wellbutrin, risperidone, clonazepam, gabapentin; reports only taking Wellbutrin.   Home med: Wellbutrin SR 200mg BID, iSTOP with clonazepam filled monthly last filled 12/18 and per discussion today with patient's PCP he has most recently been rx'd risperidone, clonazepam, Wellbutrin (per the  shop last filled on 12/19/24)  - c/w buproprion 300mg qd (therapeutic interchange for Wellbutrin 150mg BID)   - c/w risperidone 0.5mg HS  - c/w clonazepam 0.5mg HS    #H/o Alcohol use disorder   ~2 drinks per week, Last drink 1/10. Per chart review, was empirically treated for Wernicke’s encephalopathy in 2022 at [place]. Discussed with mary's PCP about AUD and she states he is not forthcoming about his alcohol intake and quantity and frequency is not clear.   - IV thiamine 500mg qd x3d  - c/w folic acid, multivitamin qd  - utox unable to be run x 2 due to acidic urine  - CIWA protocol will consider phenobarb protocol if CIWA > 8     #Chronic Dizziness and unsteady gait  Patient c/o dizziness x 2 weeks. Per patient's PCP he has been chronically been c/o dizziness x months and has been having gait difficulties  - Head CT w Moderate severity chronic white matter microvascular type changes.  - Neuro consult   - c/w meclizine PRN got 1 dose yesterday w some relief  - c/w high dose thiamine  - PT/OT consult     CV   #Septic Shock  #Sinus bradycardia; Resolved  #h/o HTN and HLD  s/p 3L fluids in [facility] ED, remained hypotensive sBP 80s, MAP low 60. TSH wnl. On Lisinopril/HCTZ outpatient  - Wean levo as tolerated, goal MAP >65   - Midodrine increased to 10mg TID 1/13 PM   - Watch HR closesly w initiation of midodrine given h/o bradycardia  - Lipid panel (Total: 85 HDL: 31 LDL: 36) okay to keep of atorvastatin  - Maintain MAP>65   - s/p 500mL LR bolus this morning   - Hold BP meds    #Elevated troponin   #Elevated pro-BNP   Suspect 2/2 demand ischemia iso septic shock and SHIRA. EKG on presentation without signs of acute infarct.   Pro-BNP 3.5k on admission. Repeat EKG on admission shows sinus bradycardia without evidence of ischemia. QTc 414  - Troponin peaked @ 51   - TTE 1/13 normal systolic function    PULMONOLOGY  #Acute hypoxic respiratory failure  #Influenza PNA  CTA: negative for PE but showed bilateral lower lobe mucus plugging as well as bronchial wall thickening.   - Weaned to room air as tolerated, Maintain SpO2 goal >92%   - Duoneb q4h standing  - chest PT BID, incentive spirometry, aerobika   - Rest of plan as below    GI   #Transaminitis  , ALT 89. Ddx hypoperfusion iso septic shock vs alcohol hepatitis  - LFTs continue to downtrend  - Hepatitis panel negative   - RUQ not indicated no RUQ tenderness and LFTs almost normal     #Nutrition; severe, chronic protein-calorie malnutrition  - Regular diet w Ensure supplementation  - RD following and recs appreciated     RENAL/   #SHIRA likely prerenal 2/2 hypovolemia (Resolved)  Cr 1.3-1.4 in 2022 and per PCP 1.27 in August 2024. P/w Cr 2.24, suspect pre-renal iso septic shock and decreased po intake. UA spec grav elevated 1.046, CK 1026, FeNa 0.9%   - BMP daily   - Strict I/O    #Mild Hyponatremia(132) possibly 2/2 SIADH 2/2 influenza; appears euvolemic on exam  -Daily BMP  -If Na continues to downtrend will consider fluid restrictions    INFECTIOUS DISEASE  #Septic shock   Patient met sepsis criteria on admission (HR >90, RR >20). RVP +influenza A. UA neg for UTI. Lactate elevated to 4.6. Hypotension refractory to 3L fluids.    Lactate cleared s/p 3L fluids. s/p CTX 1g and azithromycin in ED. Procal (after receiving abx) 1.05.  - s/p CTX 1g/Azithro 500mg x 1 (1/12)  - c/w Tamiflu 30mg q24h x5d (1/12 - 1/16) - dose adjusted for CrCl increased to 30mg BID 1/13  - Will keep off abx for now if persistently febrile or if worsening shock will start CTX 2g/Azithro 500mg for CAP coverage  - f/u BCx x2 drawn on 1/12   - Urine legionella negative  - MRSA neg  - collect sputum cx if able to produce     ENDO   #Pre-diabetes  Prescribed metformin outpatient, has not been filled. A1c 6.4.  -SSI w premeal finger sticks    #R/o Hypothyroidism   Previously prescribed Synthroid 25mcg qd filled 12/12/25 per the  shop   - TSH 1.18 T4 4.52  - c/w home dose of levothyroxine 25mcg    HEME   #Anemia likely anemia of chronic disease or from sepsis   Hgb 10.3 s/p 3L fluid resuscitation. Possible h/o pernicious anemia per [facility] chart review.   - Folate 6.0, B12 596  - folic acid and MV as above  - Maintain active T&S  - Daily CBC    MSK  #Unsteady gait   - PT/OT consult    Maintainence  F: s/p 500mL LR today  E: Replete K>4 Mg>2 Phos>3  D: Regular w Ensure  GI PPx: Not indicated  VTE PPx: SQH  Code status: Full  Emergency Contact: Not file  Dispo: MICU; Possible SD to [facility] this afternoon  Lines: PIV x 1    [facility]  c: [phone]  p: [phone]

## 2025-01-14 NOTE — PROGRESS NOTE ADULT - SUBJECTIVE AND OBJECTIVE BOX
*****INCOMPLETE NOTE*****   Patient is a 69y old  Male who presents with a chief complaint of fever, cough, SOB (13 Jan 2025 07:14)    ED Course:   Mr. Robb presented to [facility] ED on 1/12 BIBEMS w c/o genarlized weakness x the AM, shortness of breath, cough and tremors. SPO2 by EMS reportedly 90% on RA so he was placed on 2LPM NC. While in ED vital signs initially 100.1f (rectal), , BP 85/53, RR 21, SPO2 92% on 2 LPM with labs s/f BUN 25, Cr 2.234, , ALT 89, initial lactate of 4.6, troponin of 89, proBNP 3520 and RVP +influenza A. CTA of chest negative for PE but with b/l lower lobe mucus plugging. He was given CTX/Azithro, tamiflu, Duonebs, 3L LR, Mg 2g and tylenol. His lactate cleared but he remained hypotensive so was started on noepinephrine and transferreed to MICU at [facility]     Hospital/MICU Course (1/13-  ):   Admitted to MICU on 1/13 arrived from [facility] on Norepi about 1am and was initially titrated off but became hypotensive to 70s/50s so NE was resumed and patient had brief episode of bradycardia to 30s with possible junctional rhythm which self-resolved. His Cr downtrended to 1.4, outpatient psych regimen restarted after getting collateral from PCP, midodrine started to wean pressors, only received abx in ED as septic shock likely 2/2 influenza and no pna on POCUS or CXR.     Interval History/Overnight event: Midodrine up titrated to 10mg TID, weaned off norepinephrine overnight. On exam this morning patient w/o any complaints, states he feels "a little better" than when he arrived in the hospital and c/o inability to sleep because "they kept waking me up over night".      Subjective: Patient seen and examined at bedside.  REVIEW OF SYSTEMS:   CONSTITUTIONAL:  +Generalized weakness. Denies fevers/chills   EYES/ENT: +Dizziness with position changes. Denies visual changes, ear pain or throat pain   NECK: Denies pain or stiffness  RESPIRATORY: +cough nonproductive.  Denies SOB  CARDIOVASCULAR:  Denies chest pain or palpitations  GASTROINTESTINAL: +Increase in appetite this morning. Denies abdominal pain, nausea, vomiting, diarrhea or constipation.   GENITOURINARY: Denies dysuria, change in frequency, or hematuria  NEUROLOGICAL: +dizziness. Denies numbness.  SKIN:  Denies itching or rashes    Objective:   ICU Vital Signs Last 24 Hrs  T(C): 36.6 (14 Jan 2025 04:35), Max: 36.8 (13 Jan 2025 22:00)  T(F): 97.8 (14 Jan 2025 04:35), Max: 98.2 (13 Jan 2025 22:00)  HR: 75 (14 Jan 2025 08:00) (52 - 84)  BP: 92/69 (14 Jan 2025 08:00) (74/53 - 114/81)  BP(mean): 77 (14 Jan 2025 08:00) (60 - 93)  ABP: --  ABP(mean): --  RR: 27 (14 Jan 2025 08:00) (17 - 30)  SpO2: 99% (14 Jan 2025 08:00) (82% - 100%)  O2 Parameters below as of 14 Jan 2025 08:00  Patient On (Oxygen Delivery Method): room air    I&O's Summary  13 Jan 2025 07:01  -  14 Jan 2025 07:00  --------------------------------------------------------  IN: 2160.3 mL / OUT: 2200 mL / NET: -39.7 mL    PHYSICAL EXAM:   General: Elderly male in hospital bed appearing comfortable laying in bed, speaking in full sentences  HEENT: head NC/AT, no conjunctival injection, EOMI, MMM  Neck: supple, no JVD  Cardio: RRR, +S1/S2, no M/R/G  Resp: b/l expiratory wheeze to auscultation, +nonproductive cough. No rales/rhonchi, negative egophony and no and no retractions or accessory muscle use.   Abdo: soft, NT, ND, +bowel sounds x4  Extremities: WWP, no edema/cyanosis/clubbing   Vasc: 2+ radial and DP pulses b/l  Neuro: A&Ox4, nonfocal, moving all extremities, equal strength b/l, unsteady gait w transfer from bed to chair.  Psych: speech non-pressured, thoughts goal-oriented  Skin: dry, intact, no visible jaundice   Lines: PIV x1 R FA CDI    LABS:              11.7   6.15  )-----------( 209      ( 14 Jan 2025 05:30 )             34.4     01-14  133[L]  |  99  |  12  ----------------------------<  103[H]  3.8   |  22  |  1.20    Ca    7.7[L]      14 Jan 2025 05:30  Phos  5.0     01-14  Mg     1.9     01-14  TPro  5.5[L]  /  Alb  3.1[L]  /  TBili  0.2  /  DBili  x   /  AST  72[H]  /  ALT  58[H]  /  AlkPhos  64  01-14  PT/INR - ( 13 Jan 2025 05:30 )   PT: 13.2 sec;   INR: 1.15      PTT - ( 13 Jan 2025 05:30 )  PTT:37.9 sec    Urinalysis Basic - ( 14 Jan 2025 05:30 )  Color: x / Appearance: x / SG: x / pH: x  Gluc: 103 mg/dL / Ketone: x  / Bili: x / Urobili: x   Blood: x / Protein: x / Nitrite: x   Leuk Esterase: x / RBC: x / WBC x   Sq Epi: x / Non Sq Epi: x / Bacteria: x    CAPILLARY BLOOD GLUCOSE  POCT Blood Glucose.: 98 mg/dL (14 Jan 2025 06:43)  POCT Blood Glucose.: 127 mg/dL (13 Jan 2025 22:39)  POCT Blood Glucose.: 104 mg/dL (13 Jan 2025 16:30)    Micro:  COVID/Flu/RSV 1/12: +Influenza A   Legionella Urine 1/13: Negative  Blood Culture Results:   No growth at 24 hours (01-12-25 @ 16:50)  Culture Results:   No growth at 24 hours (01-12-25 @ 16:50)    RADIOLOGY & ADDITIONAL TESTS:  CTHead 1/13:  IMPRESSION: No acute intracranial hemorrhage, mass effect, or shift of   the midline structures.  Moderate severity chronic white matter microvascular type changes.    TTE 1/13:   CONCLUSIONS:   1. Normal left and right ventricular size and systolic function.   2. No significant valvular disease.   3. No evidence of pulmonary hypertension.   4. No pericardial effusion.   5. No prior echo is available for comparison.  CTA Chest 1/12: IMPRESSION:  No evidence of acute pulmonary.  Bilateral perinephric bronchial thickening particularly in the lower lobes with multifocal areas of mucous plugging in the bilateral lower lobes. Minimal bibasilar atelectasis. No segmental consolidations.    Chest XR 1/12:  IMPRESSION: The heart and lungs are normal. Chronic bilateral clavicle fractures.    Consultant(s) Notes Reviewed:  [x ] YES  [ ] NO    MEDICATIONS  (STANDING):  albuterol/ipratropium for Nebulization 3 milliLiter(s) Nebulizer every 4 hours  buPROPion XL (24-Hour) . 300 milliGRAM(s) Oral daily  chlorhexidine 2% Cloths 1 Application(s) Topical <User Schedule>  cholecalciferol 1000 Unit(s) Oral daily  clonazePAM  Tablet 0.5 milliGRAM(s) Oral at bedtime  dextrose 5%. 1000 milliLiter(s) (50 mL/Hr) IV Continuous <Continuous>  dextrose 5%. 1000 milliLiter(s) (100 mL/Hr) IV Continuous <Continuous>  dextrose 50% Injectable 25 Gram(s) IV Push once  dextrose 50% Injectable 12.5 Gram(s) IV Push once  dextrose 50% Injectable 25 Gram(s) IV Push once  folic acid 1 milliGRAM(s) Oral daily  glucagon  Injectable 1 milliGRAM(s) IntraMuscular once  heparin   Injectable 5000 Unit(s) SubCutaneous every 8 hours  influenza  Vaccine (HIGH DOSE) 0.5 milliLiter(s) IntraMuscular once  insulin lispro (ADMELOG) corrective regimen sliding scale   SubCutaneous Before meals and at bedtime  levothyroxine 25 MICROGram(s) Oral daily  midodrine 10 milliGRAM(s) Oral every 8 hours  multivitamin  Chewable 1 Tablet(s) Oral daily  norepinephrine Infusion 0.05 MICROgram(s)/kG/Min (5.53 mL/Hr) IV Continuous <Continuous>  oseltamivir 30 milliGRAM(s) Oral every 12 hours  polyethylene glycol 3350 17 Gram(s) Oral daily  risperiDONE   Tablet 0.5 milliGRAM(s) Oral at bedtime  senna 2 Tablet(s) Oral at bedtime  thiamine IVPB 500 milliGRAM(s) IV Intermittent every 8 hours    Care Discussed with Consultants/Other Providers [ x] YES  [ ] NO   *****INCOMPLETE NOTE*****   Patient is a 69y old  Male who presents with a chief complaint of fever, cough, SOB (13 Jan 2025 07:14)    ED Course:   Mr. Robb presented to [facility] ED on 1/12 BIBEMS w c/o generalized weakness x the AM, shortness of breath, cough and tremors. SPO2 by EMS reportedly 90% on RA so he was placed on 2LPM NC. While in ED vital signs initially 100.1f (rectal), , BP 85/53, RR 21, SPO2 92% on 2 LPM with labs s/f BUN 25, Cr 2.234, , ALT 89, initial lactate of 4.6, troponin of 89, proBNP 3520 and RVP +influenza A. CTA of chest negative for PE but with b/l lower lobe mucus plugging. He was given CTX/Azithro, tamiflu, Duonebs, 3L LR, Mg 2g and tylenol. His lactate cleared but he remained hypotensive so was started on norepinephrine and transferred to MICU at [facility]     Hospital/MICU Course (1/13-  ):   Admitted to MICU on 1/13 arrived from [facility] on Norepi about 1am and was initially titrated off but became hypotensive to 70s/50s so NE was resumed and patient had brief episode of bradycardia to 30s with possible junctional rhythm which self-resolved. His Cr downtrended to 1.4, outpatient psych regimen restarted after getting collateral from PCP, midodrine started to wean pressors, only received abx in ED as septic shock likely 2/2 influenza and no pna on POCUS or CXR.     Interval History/Overnight event: Midodrine up titrated to 10mg TID, weaned off norepinephrine overnight. On exam this morning patient w/o any complaints, states he feels "a little better" than when he arrived in the hospital and c/o inability to sleep because "they kept waking me up over night".      Subjective: Patient seen and examined at bedside.  REVIEW OF SYSTEMS:   CONSTITUTIONAL:  +Generalized weakness. Denies fevers/chills   EYES/ENT: +Dizziness with position changes and c/o "ear fullness". Denies visual changes, ear pain or throat pain   NECK: Denies pain or stiffness  RESPIRATORY: +cough nonproductive.  Denies SOB  CARDIOVASCULAR:  Denies chest pain or palpitations  GASTROINTESTINAL: +Increase in appetite this morning. Denies abdominal pain, nausea, vomiting, diarrhea or constipation.   GENITOURINARY: Denies dysuria, change in frequency, or hematuria  NEUROLOGICAL: +dizziness. Denies numbness.  SKIN:  Denies itching or rashes    Objective:   ICU Vital Signs Last 24 Hrs  T(C): 36.6 (14 Jan 2025 04:35), Max: 36.8 (13 Jan 2025 22:00)  T(F): 97.8 (14 Jan 2025 04:35), Max: 98.2 (13 Jan 2025 22:00)  HR: 75 (14 Jan 2025 08:00) (52 - 84)  BP: 92/69 (14 Jan 2025 08:00) (74/53 - 114/81)  BP(mean): 77 (14 Jan 2025 08:00) (60 - 93)  ABP: --  ABP(mean): --  RR: 27 (14 Jan 2025 08:00) (17 - 30)  SpO2: 99% (14 Jan 2025 08:00) (82% - 100%)  O2 Parameters below as of 14 Jan 2025 08:00  Patient On (Oxygen Delivery Method): room air    I&O's Summary  13 Jan 2025 07:01  -  14 Jan 2025 07:00  --------------------------------------------------------  IN: 2160.3 mL / OUT: 2200 mL / NET: -39.7 mL    PHYSICAL EXAM:   General: Elderly male in hospital bed appearing comfortable laying in bed, speaking in full sentences  HEENT: head NC/AT, no conjunctival injection, EOMI, MMM  Neck: supple, no JVD  Cardio: RRR, +S1/S2, no M/R/G  Resp: b/l expiratory wheeze to auscultation, +nonproductive cough. No rales/rhonchi, negative egophony and no and no retractions or accessory muscle use.   Abdo: soft, NT, ND, +bowel sounds x4  Extremities: WWP, no edema/cyanosis/clubbing   Vasc: 2+ radial and DP pulses b/l  Neuro: A&Ox4, nonfocal, moving all extremities, equal strength b/l, unsteady gait w transfer from bed to chair.  Psych: speech non-pressured, thoughts goal-oriented  Skin: dry, intact, no visible jaundice   Lines: PIV x1 R FA CDI    LABS:              11.7   6.15  )-----------( 209      ( 14 Jan 2025 05:30 )             34.4     01-14  133[L]  |  99  |  12  ----------------------------<  103[H]  3.8   |  22  |  1.20    Ca    7.7[L]      14 Jan 2025 05:30  Phos  5.0     01-14  Mg     1.9     01-14  TPro  5.5[L]  /  Alb  3.1[L]  /  TBili  0.2  /  DBili  x   /  AST  72[H]  /  ALT  58[H]  /  AlkPhos  64  01-14  PT/INR - ( 13 Jan 2025 05:30 )   PT: 13.2 sec;   INR: 1.15      PTT - ( 13 Jan 2025 05:30 )  PTT:37.9 sec    Urinalysis Basic - ( 14 Jan 2025 05:30 )  Color: x / Appearance: x / SG: x / pH: x  Gluc: 103 mg/dL / Ketone: x  / Bili: x / Urobili: x   Blood: x / Protein: x / Nitrite: x   Leuk Esterase: x / RBC: x / WBC x   Sq Epi: x / Non Sq Epi: x / Bacteria: x    CAPILLARY BLOOD GLUCOSE  POCT Blood Glucose.: 98 mg/dL (14 Jan 2025 06:43)  POCT Blood Glucose.: 127 mg/dL (13 Jan 2025 22:39)  POCT Blood Glucose.: 104 mg/dL (13 Jan 2025 16:30)    Micro:  COVID/Flu/RSV 1/12: +Influenza A   Legionella Urine 1/13: Negative  Blood Culture Results:   No growth at 24 hours (01-12-25 @ 16:50)  Culture Results:   No growth at 24 hours (01-12-25 @ 16:50)    RADIOLOGY & ADDITIONAL TESTS:  CTHead 1/13:  IMPRESSION: No acute intracranial hemorrhage, mass effect, or shift of   the midline structures.  Moderate severity chronic white matter microvascular type changes.    TTE 1/13:   CONCLUSIONS:   1. Normal left and right ventricular size and systolic function.   2. No significant valvular disease.   3. No evidence of pulmonary hypertension.   4. No pericardial effusion.   5. No prior echo is available for comparison.  CTA Chest 1/12: IMPRESSION:  No evidence of acute pulmonary.  Bilateral perinephric bronchial thickening particularly in the lower lobes with multifocal areas of mucous plugging in the bilateral lower lobes. Minimal bibasilar atelectasis. No segmental consolidations.    Chest XR 1/12:  IMPRESSION: The heart and lungs are normal. Chronic bilateral clavicle fractures.    Consultant(s) Notes Reviewed:  [x ] YES  [ ] NO    MEDICATIONS  (STANDING):  albuterol/ipratropium for Nebulization 3 milliLiter(s) Nebulizer every 4 hours  buPROPion XL (24-Hour) . 300 milliGRAM(s) Oral daily  chlorhexidine 2% Cloths 1 Application(s) Topical <User Schedule>  cholecalciferol 1000 Unit(s) Oral daily  clonazePAM  Tablet 0.5 milliGRAM(s) Oral at bedtime  dextrose 5%. 1000 milliLiter(s) (50 mL/Hr) IV Continuous <Continuous>  dextrose 5%. 1000 milliLiter(s) (100 mL/Hr) IV Continuous <Continuous>  dextrose 50% Injectable 25 Gram(s) IV Push once  dextrose 50% Injectable 12.5 Gram(s) IV Push once  dextrose 50% Injectable 25 Gram(s) IV Push once  folic acid 1 milliGRAM(s) Oral daily  glucagon  Injectable 1 milliGRAM(s) IntraMuscular once  heparin   Injectable 5000 Unit(s) SubCutaneous every 8 hours  influenza  Vaccine (HIGH DOSE) 0.5 milliLiter(s) IntraMuscular once  insulin lispro (ADMELOG) corrective regimen sliding scale   SubCutaneous Before meals and at bedtime  levothyroxine 25 MICROGram(s) Oral daily  midodrine 10 milliGRAM(s) Oral every 8 hours  multivitamin  Chewable 1 Tablet(s) Oral daily  norepinephrine Infusion 0.05 MICROgram(s)/kG/Min (5.53 mL/Hr) IV Continuous <Continuous>  oseltamivir 30 milliGRAM(s) Oral every 12 hours  polyethylene glycol 3350 17 Gram(s) Oral daily  risperiDONE   Tablet 0.5 milliGRAM(s) Oral at bedtime  senna 2 Tablet(s) Oral at bedtime  thiamine IVPB 500 milliGRAM(s) IV Intermittent every 8 hours    Care Discussed with Consultants/Other Providers [ x] YES  [ ] NO   Patient is a 69y old  Male who presents with a chief complaint of fever, cough, SOB (13 Jan 2025 07:14)    ED Course:   Mr. Robb presented to [facility] ED on 1/12 BIBEMS w c/o generalized weakness x the AM, shortness of breath, cough and tremors. SPO2 by EMS reportedly 90% on RA so he was placed on 2LPM NC. While in ED vital signs initially 100.1f (rectal), , BP 85/53, RR 21, SPO2 92% on 2 LPM with labs s/f BUN 25, Cr 2.234, , ALT 89, initial lactate of 4.6, troponin of 89, proBNP 3520 and RVP +influenza A. CTA of chest negative for PE but with b/l lower lobe mucus plugging. He was given CTX/Azithro, tamiflu, Duonebs, 3L LR, Mg 2g and tylenol. His lactate cleared but he remained hypotensive so was started on norepinephrine and transferred to MICU at [facility]     Hospital/MICU Course (1/13-  ):   Admitted to MICU on 1/13 arrived from [facility] on Norepi about 1am and was initially titrated off but became hypotensive to 70s/50s so NE was resumed and patient had brief episode of bradycardia to 30s with possible junctional rhythm which self-resolved. His Cr downtrended to 1.4, outpatient psych regimen restarted after getting collateral from PCP, midodrine started to wean pressors, only received abx in ED as septic shock likely 2/2 influenza and no pna on POCUS or CXR.     Interval History/Overnight event: Midodrine up titrated to 10mg TID, weaned off norepinephrine overnight. On exam this morning patient w/o any complaints, states he feels "a little better" than when he arrived in the hospital and c/o inability to sleep because "they kept waking me up over night".      Subjective: Patient seen and examined at bedside.  REVIEW OF SYSTEMS:   CONSTITUTIONAL:  +Generalized weakness. Denies fevers/chills   EYES/ENT: +Dizziness with position changes and c/o "ear fullness". Denies visual changes, ear pain or throat pain   NECK: Denies pain or stiffness  RESPIRATORY: +cough nonproductive.  Denies SOB  CARDIOVASCULAR:  Denies chest pain or palpitations  GASTROINTESTINAL: +Increase in appetite this morning. Denies abdominal pain, nausea, vomiting, diarrhea or constipation.   GENITOURINARY: Denies dysuria, change in frequency, or hematuria  NEUROLOGICAL: +dizziness. Denies numbness.  SKIN:  Denies itching or rashes    Objective:   ICU Vital Signs Last 24 Hrs  T(C): 36.6 (14 Jan 2025 04:35), Max: 36.8 (13 Jan 2025 22:00)  T(F): 97.8 (14 Jan 2025 04:35), Max: 98.2 (13 Jan 2025 22:00)  HR: 75 (14 Jan 2025 08:00) (52 - 84)  BP: 92/69 (14 Jan 2025 08:00) (74/53 - 114/81)  BP(mean): 77 (14 Jan 2025 08:00) (60 - 93)  ABP: --  ABP(mean): --  RR: 27 (14 Jan 2025 08:00) (17 - 30)  SpO2: 99% (14 Jan 2025 08:00) (82% - 100%)  O2 Parameters below as of 14 Jan 2025 08:00  Patient On (Oxygen Delivery Method): room air    I&O's Summary  13 Jan 2025 07:01  -  14 Jan 2025 07:00  --------------------------------------------------------  IN: 2160.3 mL / OUT: 2200 mL / NET: -39.7 mL    PHYSICAL EXAM:   General: Elderly male in hospital bed appearing comfortable laying in bed, speaking in full sentences  HEENT: head NC/AT, no conjunctival injection, EOMI, MMM  Neck: supple, no JVD  Cardio: RRR, +S1/S2, no M/R/G  Resp: b/l expiratory wheeze to auscultation, +nonproductive cough. No rales/rhonchi, negative egophony and no and no retractions or accessory muscle use.   Abdo: soft, NT, ND, +bowel sounds x4  Extremities: WWP, no edema/cyanosis/clubbing   Vasc: 2+ radial and DP pulses b/l  Neuro: A&Ox4, nonfocal, moving all extremities, equal strength b/l, unsteady gait w transfer from bed to chair.  Psych: speech non-pressured, thoughts goal-oriented  Skin: dry, intact, no visible jaundice   Lines: PIV x1 R FA CDI    LABS:              11.7   6.15  )-----------( 209      ( 14 Jan 2025 05:30 )             34.4     01-14  133[L]  |  99  |  12  ----------------------------<  103[H]  3.8   |  22  |  1.20    Ca    7.7[L]      14 Jan 2025 05:30  Phos  5.0     01-14  Mg     1.9     01-14  TPro  5.5[L]  /  Alb  3.1[L]  /  TBili  0.2  /  DBili  x   /  AST  72[H]  /  ALT  58[H]  /  AlkPhos  64  01-14  PT/INR - ( 13 Jan 2025 05:30 )   PT: 13.2 sec;   INR: 1.15      PTT - ( 13 Jan 2025 05:30 )  PTT:37.9 sec    Urinalysis Basic - ( 14 Jan 2025 05:30 )  Color: x / Appearance: x / SG: x / pH: x  Gluc: 103 mg/dL / Ketone: x  / Bili: x / Urobili: x   Blood: x / Protein: x / Nitrite: x   Leuk Esterase: x / RBC: x / WBC x   Sq Epi: x / Non Sq Epi: x / Bacteria: x    CAPILLARY BLOOD GLUCOSE  POCT Blood Glucose.: 98 mg/dL (14 Jan 2025 06:43)  POCT Blood Glucose.: 127 mg/dL (13 Jan 2025 22:39)  POCT Blood Glucose.: 104 mg/dL (13 Jan 2025 16:30)    Micro:  COVID/Flu/RSV 1/12: +Influenza A   Legionella Urine 1/13: Negative  Blood Culture Results:   No growth at 24 hours (01-12-25 @ 16:50)  Culture Results:   No growth at 24 hours (01-12-25 @ 16:50)    RADIOLOGY & ADDITIONAL TESTS:  CTHead 1/13:  IMPRESSION: No acute intracranial hemorrhage, mass effect, or shift of   the midline structures.  Moderate severity chronic white matter microvascular type changes.    TTE 1/13:   CONCLUSIONS:   1. Normal left and right ventricular size and systolic function.   2. No significant valvular disease.   3. No evidence of pulmonary hypertension.   4. No pericardial effusion.   5. No prior echo is available for comparison.  CTA Chest 1/12: IMPRESSION:  No evidence of acute pulmonary.  Bilateral perinephric bronchial thickening particularly in the lower lobes with multifocal areas of mucous plugging in the bilateral lower lobes. Minimal bibasilar atelectasis. No segmental consolidations.    Chest XR 1/12:  IMPRESSION: The heart and lungs are normal. Chronic bilateral clavicle fractures.    Consultant(s) Notes Reviewed:  [x ] YES  [ ] NO    MEDICATIONS  (STANDING):  albuterol/ipratropium for Nebulization 3 milliLiter(s) Nebulizer every 4 hours  buPROPion XL (24-Hour) . 300 milliGRAM(s) Oral daily  chlorhexidine 2% Cloths 1 Application(s) Topical <User Schedule>  cholecalciferol 1000 Unit(s) Oral daily  clonazePAM  Tablet 0.5 milliGRAM(s) Oral at bedtime  dextrose 5%. 1000 milliLiter(s) (50 mL/Hr) IV Continuous <Continuous>  dextrose 5%. 1000 milliLiter(s) (100 mL/Hr) IV Continuous <Continuous>  dextrose 50% Injectable 25 Gram(s) IV Push once  dextrose 50% Injectable 12.5 Gram(s) IV Push once  dextrose 50% Injectable 25 Gram(s) IV Push once  folic acid 1 milliGRAM(s) Oral daily  glucagon  Injectable 1 milliGRAM(s) IntraMuscular once  heparin   Injectable 5000 Unit(s) SubCutaneous every 8 hours  influenza  Vaccine (HIGH DOSE) 0.5 milliLiter(s) IntraMuscular once  insulin lispro (ADMELOG) corrective regimen sliding scale   SubCutaneous Before meals and at bedtime  levothyroxine 25 MICROGram(s) Oral daily  midodrine 10 milliGRAM(s) Oral every 8 hours  multivitamin  Chewable 1 Tablet(s) Oral daily  norepinephrine Infusion 0.05 MICROgram(s)/kG/Min (5.53 mL/Hr) IV Continuous <Continuous>  oseltamivir 30 milliGRAM(s) Oral every 12 hours  polyethylene glycol 3350 17 Gram(s) Oral daily  risperiDONE   Tablet 0.5 milliGRAM(s) Oral at bedtime  senna 2 Tablet(s) Oral at bedtime  thiamine IVPB 500 milliGRAM(s) IV Intermittent every 8 hours    Care Discussed with Consultants/Other Providers [ x] YES  [ ] NO

## 2025-01-14 NOTE — PROGRESS NOTE ADULT - ATTENDING COMMENTS
69 M p/w influenza A, hypotensive and junctional rhythm   on oseltamivir, monitoring off abx   - off pressors, had been started on midodrine  -SHIRA improving    Decision making high complexity

## 2025-01-14 NOTE — CONSULT NOTE ADULT - SUBJECTIVE AND OBJECTIVE BOX
NEUROLOGY CONSULT    HPI:  69M PMH (per chart review) EtOH use disorder, HTN, ?hypothyroidism, ?schizoaffective disorder, CAD, ?CKD, diabetes who was BIBEMS to [facility] from shelter for SOB, cough, fevers, and malaise admitted to MICU for septic shock 2/2 influenza. Neurology consulted for dizziness. He says he started with dizziness, described as disequilibrium (rocking on a ship), 2 weeks ago (however per medicine team may have started months ago as they called his PCP) with associated hearing loss and tinnitus (buzzing). He says his symptoms are intermittent, feels asymptomatic when lying down but has the sensation when he stands up and tries to walk. No visual symptoms. He thinks the symptoms started gradually but has trouble pinpointing an exact time at onset. No other alleviating or exacerbating symptoms.        MEDICATIONS  Home Medications:  Wellbutrin  mg/12 hours oral tablet, extended release: 1 tab(s) orally every 12 hours (13 Jan 2025 01:58)    MEDICATIONS  (STANDING):  albuterol/ipratropium for Nebulization 3 milliLiter(s) Nebulizer every 4 hours  buPROPion XL (24-Hour) . 300 milliGRAM(s) Oral daily  chlorhexidine 2% Cloths 1 Application(s) Topical <User Schedule>  cholecalciferol 1000 Unit(s) Oral daily  clonazePAM  Tablet 0.5 milliGRAM(s) Oral at bedtime  dextrose 5%. 1000 milliLiter(s) (50 mL/Hr) IV Continuous <Continuous>  dextrose 5%. 1000 milliLiter(s) (100 mL/Hr) IV Continuous <Continuous>  dextrose 50% Injectable 25 Gram(s) IV Push once  dextrose 50% Injectable 12.5 Gram(s) IV Push once  dextrose 50% Injectable 25 Gram(s) IV Push once  folic acid 1 milliGRAM(s) Oral daily  glucagon  Injectable 1 milliGRAM(s) IntraMuscular once  heparin   Injectable 5000 Unit(s) SubCutaneous every 8 hours  influenza  Vaccine (HIGH DOSE) 0.5 milliLiter(s) IntraMuscular once  insulin lispro (ADMELOG) corrective regimen sliding scale   SubCutaneous Before meals and at bedtime  levothyroxine 25 MICROGram(s) Oral daily  multivitamin  Chewable 1 Tablet(s) Oral daily  oseltamivir 30 milliGRAM(s) Oral every 12 hours  polyethylene glycol 3350 17 Gram(s) Oral daily  risperiDONE   Tablet 0.5 milliGRAM(s) Oral at bedtime  senna 2 Tablet(s) Oral at bedtime  thiamine IVPB 500 milliGRAM(s) IV Intermittent every 8 hours    MEDICATIONS  (PRN):  dextrose Oral Gel 15 Gram(s) Oral once PRN Blood Glucose LESS THAN 70 milliGRAM(s)/deciliter  meclizine 25 milliGRAM(s) Oral every 12 hours PRN Dizziness      FAMILY HISTORY:    SOCIAL HISTORY: negative for tobacco, alcohol, or ilicit drug use.    Allergies    No Known Allergies    Intolerances    Neurologic:   -Mental status: Awake, alert, oriented to person, place, and time. Speech is fluent with intact naming, repetition, and comprehension, no dysarthria.Follows commands. Attention/concentration intact.  -Cranial nerves:   II: Visual fields are full to confrontation.  III, IV, VI: Extraocular movements are intact without nystagmus. Pupils equally round and reactive to light  V:  Facial sensation V1-V3 equal and intact   VII: Face is symmetric with normal eye closure and smile  VIII: Hearing is bilaterally intact to finger rub  IX, X: Uvula is midline and soft palate rises symmetrically  XI: Head turning and shoulder shrug are intact.  XII: Tongue protrudes midline  Motor: Normal bulk and tone. No pronator drift. Strength bilateral upper extremity 5/5, bilateral lower extremities 5/5.  Rapid alternating movements intact and symmetric  Sensation: Intact to light touch bilaterally. No neglect or extinction on double simultaneous testing.  Coordination: No dysmetria on finger-to-nose and heel-to-shin bilaterally No dysdiadochokinesia   Reflexes: Downgoing toes bilaterally. DTRs 2+ throughout  Gait: Slightly wide based with walker. Romberg deferred for safety.    HINTS (asymptomatic during examination) negative.    LABS:                        11.7   6.15  )-----------( 209      ( 14 Jan 2025 05:30 )             34.4     01-14    133[L]  |  99  |  12  ----------------------------<  103[H]  3.8   |  22  |  1.20    Ca    7.7[L]      14 Jan 2025 05:30  Phos  5.0     01-14  Mg     1.9     01-14    TPro  5.5[L]  /  Alb  3.1[L]  /  TBili  0.2  /  DBili  x   /  AST  72[H]  /  ALT  58[H]  /  AlkPhos  64  01-14    Hemoglobin A1C:   Vitamin B12   PT/INR - ( 13 Jan 2025 05:30 )   PT: 13.2 sec;   INR: 1.15          PTT - ( 13 Jan 2025 05:30 )  PTT:37.9 sec  CAPILLARY BLOOD GLUCOSE      POCT Blood Glucose.: 112 mg/dL (14 Jan 2025 11:04)      Urinalysis Basic - ( 14 Jan 2025 05:30 )    Color: x / Appearance: x / SG: x / pH: x  Gluc: 103 mg/dL / Ketone: x  / Bili: x / Urobili: x   Blood: x / Protein: x / Nitrite: x   Leuk Esterase: x / RBC: x / WBC x   Sq Epi: x / Non Sq Epi: x / Bacteria: x            Microbiology:    Culture - Blood (collected 12 Jan 2025 16:50)  Source: .Blood BLOOD  Preliminary Report (13 Jan 2025 23:01):    No growth at 24 hours    Culture - Blood (collected 12 Jan 2025 16:50)  Source: .Blood BLOOD  Preliminary Report (13 Jan 2025 23:01):    No growth at 24 hours    Urinalysis with Rflx Culture (collected 12 Jan 2025 16:50)        RADIOLOGY, EKG AND ADDITIONAL TESTS: Reviewed.

## 2025-01-14 NOTE — CONSULT NOTE ADULT - ATTENDING COMMENTS
triggered intermittent vestibular syndrome. only trigger is ambulation which might indicate the problem is imbalance rather than dizziness. however, causes of triggered vestibular dysfunction must be considered, orthostatic hypotension, vertebrobasilar insufficiency, posterior fossa lesion. Orthostatics negative. Of note, significant temporal and frontal atrophy on CT head.    Obtain MRI brain w/ and w/o IAC protocol  Obtain MRA head and neck

## 2025-01-15 LAB
ALBUMIN SERPL ELPH-MCNC: 3 G/DL — LOW (ref 3.3–5)
ALP SERPL-CCNC: 68 U/L — SIGNIFICANT CHANGE UP (ref 40–120)
ALT FLD-CCNC: 39 U/L — SIGNIFICANT CHANGE UP (ref 10–45)
ANION GAP SERPL CALC-SCNC: 13 MMOL/L — SIGNIFICANT CHANGE UP (ref 5–17)
AST SERPL-CCNC: 41 U/L — HIGH (ref 10–40)
BASOPHILS # BLD AUTO: 0.03 K/UL — SIGNIFICANT CHANGE UP (ref 0–0.2)
BASOPHILS NFR BLD AUTO: 0.4 % — SIGNIFICANT CHANGE UP (ref 0–2)
BILIRUB SERPL-MCNC: 0.3 MG/DL — SIGNIFICANT CHANGE UP (ref 0.2–1.2)
BUN SERPL-MCNC: 15 MG/DL — SIGNIFICANT CHANGE UP (ref 7–23)
CALCIUM SERPL-MCNC: 7.5 MG/DL — LOW (ref 8.4–10.5)
CHLORIDE SERPL-SCNC: 101 MMOL/L — SIGNIFICANT CHANGE UP (ref 96–108)
CO2 SERPL-SCNC: 20 MMOL/L — LOW (ref 22–31)
CREAT SERPL-MCNC: 1.18 MG/DL — SIGNIFICANT CHANGE UP (ref 0.5–1.3)
EGFR: 67 ML/MIN/1.73M2 — SIGNIFICANT CHANGE UP
EOSINOPHIL # BLD AUTO: 0.14 K/UL — SIGNIFICANT CHANGE UP (ref 0–0.5)
EOSINOPHIL NFR BLD AUTO: 1.9 % — SIGNIFICANT CHANGE UP (ref 0–6)
GLUCOSE SERPL-MCNC: 103 MG/DL — HIGH (ref 70–99)
HCT VFR BLD CALC: 31.5 % — LOW (ref 39–50)
HGB BLD-MCNC: 10.7 G/DL — LOW (ref 13–17)
IMM GRANULOCYTES NFR BLD AUTO: 0.3 % — SIGNIFICANT CHANGE UP (ref 0–0.9)
LYMPHOCYTES # BLD AUTO: 2.6 K/UL — SIGNIFICANT CHANGE UP (ref 1–3.3)
LYMPHOCYTES # BLD AUTO: 35.8 % — SIGNIFICANT CHANGE UP (ref 13–44)
MAGNESIUM SERPL-MCNC: 1.7 MG/DL — SIGNIFICANT CHANGE UP (ref 1.6–2.6)
MCHC RBC-ENTMCNC: 32.2 PG — SIGNIFICANT CHANGE UP (ref 27–34)
MCHC RBC-ENTMCNC: 34 G/DL — SIGNIFICANT CHANGE UP (ref 32–36)
MCV RBC AUTO: 94.9 FL — SIGNIFICANT CHANGE UP (ref 80–100)
MONOCYTES # BLD AUTO: 0.49 K/UL — SIGNIFICANT CHANGE UP (ref 0–0.9)
MONOCYTES NFR BLD AUTO: 6.7 % — SIGNIFICANT CHANGE UP (ref 2–14)
NEUTROPHILS # BLD AUTO: 3.98 K/UL — SIGNIFICANT CHANGE UP (ref 1.8–7.4)
NEUTROPHILS NFR BLD AUTO: 54.9 % — SIGNIFICANT CHANGE UP (ref 43–77)
NRBC # BLD: 0 /100 WBCS — SIGNIFICANT CHANGE UP (ref 0–0)
NRBC BLD-RTO: 0 /100 WBCS — SIGNIFICANT CHANGE UP (ref 0–0)
PHOSPHATE SERPL-MCNC: 1.9 MG/DL — LOW (ref 2.5–4.5)
PLATELET # BLD AUTO: 183 K/UL — SIGNIFICANT CHANGE UP (ref 150–400)
POTASSIUM SERPL-MCNC: 3.9 MMOL/L — SIGNIFICANT CHANGE UP (ref 3.5–5.3)
POTASSIUM SERPL-SCNC: 3.9 MMOL/L — SIGNIFICANT CHANGE UP (ref 3.5–5.3)
PROT SERPL-MCNC: 5.3 G/DL — LOW (ref 6–8.3)
RBC # BLD: 3.32 M/UL — LOW (ref 4.2–5.8)
RBC # FLD: 13.8 % — SIGNIFICANT CHANGE UP (ref 10.3–14.5)
SODIUM SERPL-SCNC: 134 MMOL/L — LOW (ref 135–145)
WBC # BLD: 7.26 K/UL — SIGNIFICANT CHANGE UP (ref 3.8–10.5)
WBC # FLD AUTO: 7.26 K/UL — SIGNIFICANT CHANGE UP (ref 3.8–10.5)

## 2025-01-15 PROCEDURE — 99233 SBSQ HOSP IP/OBS HIGH 50: CPT | Mod: GC

## 2025-01-15 PROCEDURE — 99232 SBSQ HOSP IP/OBS MODERATE 35: CPT

## 2025-01-15 PROCEDURE — 95816 EEG AWAKE AND DROWSY: CPT | Mod: 26

## 2025-01-15 RX ORDER — SODIUM CHLORIDE 9 G/ML
1000 INJECTION, SOLUTION INTRAVENOUS ONCE
Refills: 0 | Status: COMPLETED | OUTPATIENT
Start: 2025-01-15 | End: 2025-01-15

## 2025-01-15 RX ORDER — MIDODRINE HYDROCHLORIDE 5 MG/1
10 TABLET ORAL EVERY 8 HOURS
Refills: 0 | Status: DISCONTINUED | OUTPATIENT
Start: 2025-01-15 | End: 2025-01-15

## 2025-01-15 RX ORDER — MIDODRINE HYDROCHLORIDE 5 MG/1
20 TABLET ORAL EVERY 8 HOURS
Refills: 0 | Status: DISCONTINUED | OUTPATIENT
Start: 2025-01-15 | End: 2025-01-16

## 2025-01-15 RX ORDER — ATORVASTATIN CALCIUM 80 MG/1
20 TABLET, FILM COATED ORAL AT BEDTIME
Refills: 0 | Status: DISCONTINUED | OUTPATIENT
Start: 2025-01-15 | End: 2025-01-21

## 2025-01-15 RX ORDER — MIDODRINE HYDROCHLORIDE 5 MG/1
5 TABLET ORAL ONCE
Refills: 0 | Status: COMPLETED | OUTPATIENT
Start: 2025-01-15 | End: 2025-01-15

## 2025-01-15 RX ORDER — POTASSIUM PHOSPHATE, MONOBASIC POTASSIUM PHOSPHATE, DIBASIC 236; 224 MG/ML; MG/ML
30 INJECTION, SOLUTION INTRAVENOUS ONCE
Refills: 0 | Status: DISCONTINUED | OUTPATIENT
Start: 2025-01-15 | End: 2025-01-15

## 2025-01-15 RX ORDER — SODIUM PHOSPHATE, DIBASIC, ANHYDROUS, POTASSIUM PHOSPHATE, MONOBASIC, AND SODIUM PHOSPHATE, MONOBASIC, MONOHYDRATE 852; 155; 130 MG/1; MG/1; MG/1
1 TABLET, COATED ORAL ONCE
Refills: 0 | Status: COMPLETED | OUTPATIENT
Start: 2025-01-15 | End: 2025-01-15

## 2025-01-15 RX ORDER — OSELTAMIVIR PHOSPHATE 75 MG/1
30 CAPSULE ORAL EVERY 12 HOURS
Refills: 0 | Status: COMPLETED | OUTPATIENT
Start: 2025-01-15 | End: 2025-01-17

## 2025-01-15 RX ORDER — THIAMINE HCL 100 MG
100 TABLET ORAL DAILY
Refills: 0 | Status: DISCONTINUED | OUTPATIENT
Start: 2025-01-16 | End: 2025-01-24

## 2025-01-15 RX ORDER — IPRATROPIUM BROMIDE AND ALBUTEROL SULFATE .5; 2.5 MG/3ML; MG/3ML
3 SOLUTION RESPIRATORY (INHALATION) EVERY 6 HOURS
Refills: 0 | Status: DISCONTINUED | OUTPATIENT
Start: 2025-01-15 | End: 2025-01-21

## 2025-01-15 RX ORDER — ENOXAPARIN SODIUM 100 MG/ML
40 INJECTION SUBCUTANEOUS EVERY 24 HOURS
Refills: 0 | Status: DISCONTINUED | OUTPATIENT
Start: 2025-01-15 | End: 2025-01-24

## 2025-01-15 RX ADMIN — SODIUM PHOSPHATE, DIBASIC, ANHYDROUS, POTASSIUM PHOSPHATE, MONOBASIC, AND SODIUM PHOSPHATE, MONOBASIC, MONOHYDRATE 1 PACKET(S): 852; 155; 130 TABLET, COATED ORAL at 12:31

## 2025-01-15 RX ADMIN — ATORVASTATIN CALCIUM 20 MILLIGRAM(S): 80 TABLET, FILM COATED ORAL at 21:16

## 2025-01-15 RX ADMIN — IPRATROPIUM BROMIDE AND ALBUTEROL SULFATE 3 MILLILITER(S): .5; 2.5 SOLUTION RESPIRATORY (INHALATION) at 06:17

## 2025-01-15 RX ADMIN — SODIUM CHLORIDE 1000 MILLILITER(S): 9 INJECTION, SOLUTION INTRAVENOUS at 12:28

## 2025-01-15 RX ADMIN — OSELTAMIVIR PHOSPHATE 30 MILLIGRAM(S): 75 CAPSULE ORAL at 05:05

## 2025-01-15 RX ADMIN — Medication 5000 UNIT(S): at 05:05

## 2025-01-15 RX ADMIN — Medication 1 TABLET(S): at 12:30

## 2025-01-15 RX ADMIN — Medication 105 MILLIGRAM(S): at 05:05

## 2025-01-15 RX ADMIN — POLYETHYLENE GLYCOL 3350 17 GRAM(S): 17 POWDER, FOR SOLUTION ORAL at 12:31

## 2025-01-15 RX ADMIN — MIDODRINE HYDROCHLORIDE 5 MILLIGRAM(S): 5 TABLET ORAL at 05:05

## 2025-01-15 RX ADMIN — OSELTAMIVIR PHOSPHATE 30 MILLIGRAM(S): 75 CAPSULE ORAL at 17:06

## 2025-01-15 RX ADMIN — Medication 0.5 MILLIGRAM(S): at 21:16

## 2025-01-15 RX ADMIN — Medication 1000 UNIT(S): at 12:27

## 2025-01-15 RX ADMIN — LEVOTHYROXINE SODIUM 25 MICROGRAM(S): 25 TABLET ORAL at 05:05

## 2025-01-15 RX ADMIN — IPRATROPIUM BROMIDE AND ALBUTEROL SULFATE 3 MILLILITER(S): .5; 2.5 SOLUTION RESPIRATORY (INHALATION) at 03:11

## 2025-01-15 RX ADMIN — MIDODRINE HYDROCHLORIDE 20 MILLIGRAM(S): 5 TABLET ORAL at 21:16

## 2025-01-15 RX ADMIN — IPRATROPIUM BROMIDE AND ALBUTEROL SULFATE 3 MILLILITER(S): .5; 2.5 SOLUTION RESPIRATORY (INHALATION) at 13:41

## 2025-01-15 RX ADMIN — Medication 2 TABLET(S): at 21:17

## 2025-01-15 RX ADMIN — FLUDROCORTISONE ACETATE 0.1 MILLIGRAM(S): 0.1 TABLET ORAL at 17:06

## 2025-01-15 RX ADMIN — MIDODRINE HYDROCHLORIDE 5 MILLIGRAM(S): 5 TABLET ORAL at 10:40

## 2025-01-15 RX ADMIN — ENOXAPARIN SODIUM 40 MILLIGRAM(S): 100 INJECTION SUBCUTANEOUS at 12:32

## 2025-01-15 RX ADMIN — MIDODRINE HYDROCHLORIDE 10 MILLIGRAM(S): 5 TABLET ORAL at 13:58

## 2025-01-15 RX ADMIN — Medication 1 MILLIGRAM(S): at 12:27

## 2025-01-15 RX ADMIN — Medication 0.5 MILLIGRAM(S): at 21:20

## 2025-01-15 RX ADMIN — IPRATROPIUM BROMIDE AND ALBUTEROL SULFATE 3 MILLILITER(S): .5; 2.5 SOLUTION RESPIRATORY (INHALATION) at 21:56

## 2025-01-15 RX ADMIN — IPRATROPIUM BROMIDE AND ALBUTEROL SULFATE 3 MILLILITER(S): .5; 2.5 SOLUTION RESPIRATORY (INHALATION) at 10:10

## 2025-01-15 RX ADMIN — BUPROPION HYDROCHLORIDE 300 MILLIGRAM(S): 150 TABLET, EXTENDED RELEASE ORAL at 12:27

## 2025-01-15 NOTE — PROGRESS NOTE ADULT - ASSESSMENT
69M PMH (per chart review) EtOH use disorder, depression, former smoker (20 pack year quit 15 years ago), HTN, hypothyroid, CAD, diabetes who was BIBEMS to [facility] from shelter for SOB, cough, fevers, and malaise. Transferred to MICU for septic shock 2/2 influenza now off pressors and weaned to nasal cannula.     NEURO   #MDD  #h/o schizoaffective disorder  Psychiatrist Dr. Corrales. Per chart review, has previously been prescribed Wellbutrin, risperidone, clonazepam, gabapentin; reports only taking Wellbutrin.   Home med: Wellbutrin SR 200mg BID, iSTOP with clonazepam filled monthly last filled 12/18 and per discussion today with patient's PCP he has most recently been rx'd risperidone, clonazepam, Wellbutrin (per the  shop last filled on 12/19/24)  - c/w buproprion 300mg qd (therapeutic interchange for Wellbutrin 150mg BID)   - c/w risperidone 0.5mg HS  - c/w clonazepam 0.5mg HS    #H/o Alcohol use disorder   ~2 drinks per week, Last drink 1/10. Per chart review, was empirically treated for Wernicke’s encephalopathy in 2022 at [place]. Discussed with mary's PCP about AUD and she states he is not forthcoming about his alcohol intake and quantity and frequency is not clear.   - IV thiamine 500mg qd x3d  - c/w folic acid, multivitamin qd  - utox unable to be run x 2 due to acidic urine  - CIWA protocol will consider phenobarb protocol if CIWA > 8     #Chronic Dizziness and unsteady gait  Patient c/o dizziness x 2 weeks. Per patient's PCP he has been chronically been c/o dizziness x months and has been having gait difficulties  - Head CT w Moderate severity chronic white matter microvascular type changes.  - Neuro consult   - c/w meclizine PRN got 1 dose yesterday w some relief  - c/w high dose thiamine  - PT/OT consult     CV   #Septic Shock; resolved  #Sinus bradycardia; Resolved  #h/o HTN and HLD  s/p 3L fluids in [facility] ED, remained hypotensive sBP 80s, MAP low 60. TSH wnl. On Lisinopril/HCTZ outpatient  - Weaned levophed  - Midodrine increased to 10mg TID 1/13 PM --> increased to 20mg TID 1/15 PM  - started fludrocortisone 0.1mg qd 1/14 PM  - Watch HR closely w initiation of midodrine given h/o bradycardia  - Lipid panel (Total: 85 HDL: 31 LDL: 36) okay to keep of atorvastatin  - Maintain MAP>65   - s/p 500mL LR bolus this morning   - Hold BP meds    #Elevated troponin   #Elevated pro-BNP   Suspect 2/2 demand ischemia iso septic shock and SHIRA. EKG on presentation without signs of acute infarct.   Pro-BNP 3.5k on admission. Repeat EKG on admission shows sinus bradycardia without evidence of ischemia. QTc 414  - Troponin peaked @ 51   - TTE 1/13 normal systolic function    PULMONOLOGY  #Acute hypoxic respiratory failure  #Influenza PNA  CTA: negative for PE but showed bilateral lower lobe mucus plugging as well as bronchial wall thickening.   - Weaned to room air as tolerated, Maintain SpO2 goal >92%   - Duoneb q4h standing  - chest PT BID, incentive spirometry, aerobika   - Rest of plan as below    GI   #Transaminitis  , ALT 89. Ddx hypoperfusion iso septic shock vs alcohol hepatitis  - LFTs continue to downtrend  - Hepatitis panel negative   - RUQ not indicated no RUQ tenderness and LFTs almost normal     #Nutrition; severe, chronic protein-calorie malnutrition  - Regular diet w Ensure supplementation  - RD following and recs appreciated     RENAL/   #SHIRA likely prerenal 2/2 hypovolemia (Resolved)  Cr 1.3-1.4 in 2022 and per PCP 1.27 in August 2024. P/w Cr 2.24, suspect pre-renal iso septic shock and decreased po intake. UA spec grav elevated 1.046, CK 1026, FeNa 0.9%   - BMP daily   - Strict I/O    #Mild Hyponatremia(132) possibly 2/2 SIADH 2/2 influenza; appears euvolemic on exam  -Daily BMP  -If Na continues to downtrend will consider fluid restrictions    INFECTIOUS DISEASE  #Septic shock   Patient met sepsis criteria on admission (HR >90, RR >20). RVP +influenza A. UA neg for UTI. Lactate elevated to 4.6. Hypotension refractory to 3L fluids.    Lactate cleared s/p 3L fluids. s/p CTX 1g and azithromycin in ED. Procal (after receiving abx) 1.05.  - s/p CTX 1g/Azithro 500mg x 1 (1/12)  - c/w Tamiflu 30mg q24h x5d (1/12 - 1/16) - dose adjusted for CrCl increased to 30mg BID 1/13  - Will keep off abx for now if persistently febrile or if worsening shock will start CTX 2g/Azithro 500mg for CAP coverage  - f/u BCx x2 drawn on 1/12   - Urine legionella negative  - MRSA neg  - collect sputum cx if able to produce     ENDO   #Pre-diabetes  Prescribed metformin outpatient, has not been filled. A1c 6.4.  -SSI w premeal finger sticks    #R/o Hypothyroidism   Previously prescribed Synthroid 25mcg qd filled 12/12/25 per the  shop   - TSH 1.18 T4 4.52  - c/w home dose of levothyroxine 25mcg    HEME   #Anemia likely anemia of chronic disease or from sepsis   Hgb 10.3 s/p 3L fluid resuscitation. Possible h/o pernicious anemia per [facility] chart review.   - Folate 6.0, B12 596  - folic acid and MV as above  - Maintain active T&S  - Daily CBC    MSK  #Unsteady gait   - PT/OT consult    Maintainence  F: s/p 500mL LR today  E: Replete K>4 Mg>2 Phos>3  D: Regular w Ensure  GI PPx: Not indicated  VTE PPx: SQH  Code status: Full  Emergency Contact: Not file  Dispo: MICU; Possible SD to [facility] this afternoon  Lines: PIV x 1    Bebeto Lewis [facility]  c: [phone]  p: [phone] 69M PMH (per chart review) EtOH use disorder, depression, former smoker (20 pack year quit 15 years ago), HTN, hypothyroid, CAD, diabetes who was BIBEMS to [facility] from shelter for SOB, cough, fevers, and malaise. Transferred to MICU for septic shock 2/2 influenza now off pressors and weaned to nasal cannula.     NEURO   #MDD  #h/o schizoaffective disorder  Psychiatrist Dr. Corrales. Per chart review, has previously been prescribed Wellbutrin, risperidone, clonazepam, gabapentin; reports only taking Wellbutrin.   Home med: Wellbutrin SR 200mg BID, iSTOP with clonazepam filled monthly last filled 12/18 and per discussion today with patient's PCP he has most recently been rx'd risperidone, clonazepam, Wellbutrin (per the  shop last filled on 12/19/24)  - c/w buproprion 300mg qd (therapeutic interchange for Wellbutrin 150mg BID)   - c/w risperidone 0.5mg HS  - c/w clonazepam 0.5mg HS    #H/o Alcohol use disorder   ~2 drinks per week, Last drink 1/10. Per chart review, was empirically treated for Wernicke’s encephalopathy in 2022 at [place]. Discussed with mary's PCP about AUD and she states he is not forthcoming about his alcohol intake and quantity and frequency is not clear.   - IV thiamine 500mg qd x3d  - c/w folic acid, multivitamin qd  - utox unable to be run x 2 due to acidic urine  - CIWA protocol will consider phenobarb protocol if CIWA > 8     #Chronic Dizziness and unsteady gait  Patient c/o dizziness x 2 weeks. Per patient's PCP he has been chronically been c/o dizziness x months and has been having gait difficulties  - Head CT w Moderate severity chronic white matter microvascular type changes.  - Neuro consult   - c/w meclizine PRN got 1 dose yesterday w some relief  - c/w high dose thiamine  - PT/OT consult     CV   #Septic Shock; resolved  #Sinus bradycardia; Resolved  #h/o HTN and HLD  s/p 3L fluids in [facility] ED, remained hypotensive sBP 80s, MAP low 60. TSH wnl. On Lisinopril/HCTZ outpatient  - Weaned levophed  - Midodrine increased to 10mg TID 1/13 PM --> increased to 20mg TID 1/15 PM  - started fludrocortisone 0.1mg qd 1/14 PM  - Watch HR closely w initiation of midodrine given h/o bradycardia  - Lipid panel (Total: 85 HDL: 31 LDL: 36) okay to keep of atorvastatin  - Maintain MAP>65   - s/p 500mL LR bolus this morning   - Hold BP meds    #Elevated troponin   #Elevated pro-BNP   Suspect 2/2 demand ischemia iso septic shock and SHIRA. EKG on presentation without signs of acute infarct.   Pro-BNP 3.5k on admission. Repeat EKG on admission shows sinus bradycardia without evidence of ischemia. QTc 414  - Troponin peaked @ 51   - TTE 1/13 normal systolic function    PULMONOLOGY  #Acute hypoxic respiratory failure  #Influenza PNA  CTA: negative for PE but showed bilateral lower lobe mucus plugging as well as bronchial wall thickening.   - Weaned to room air as tolerated, Maintain SpO2 goal >92%   - Duoneb q4h standing  - chest PT BID, incentive spirometry, aerobika   - Rest of plan as below    GI   #Transaminitis  , ALT 89. Ddx hypoperfusion iso septic shock vs alcohol hepatitis  - LFTs continue to downtrend  - Hepatitis panel negative   - RUQ not indicated no RUQ tenderness and LFTs almost normal     #Nutrition; severe, chronic protein-calorie malnutrition  - Regular diet w Ensure supplementation  - RD following and recs appreciated     RENAL/   #SHIRA likely prerenal 2/2 hypovolemia (Resolved)  Cr 1.3-1.4 in 2022 and per PCP 1.27 in August 2024. P/w Cr 2.24, suspect pre-renal iso septic shock and decreased po intake. UA spec grav elevated 1.046, CK 1026, FeNa 0.9%   - BMP daily   - Strict I/O    #Mild Hyponatremia(132) possibly 2/2 SIADH 2/2 influenza; appears euvolemic on exam  -Daily BMP  -If Na continues to downtrend will consider fluid restrictions    INFECTIOUS DISEASE  #Septic shock   Patient met sepsis criteria on admission (HR >90, RR >20). RVP +influenza A. UA neg for UTI. Lactate elevated to 4.6. Hypotension refractory to 3L fluids.    Lactate cleared s/p 3L fluids. s/p CTX 1g and azithromycin in ED. Procal (after receiving abx) 1.05.  - s/p CTX 1g/Azithro 500mg x 1 (1/12)  - c/w Tamiflu 30mg q24h x5d (1/12 - 1/16) - dose adjusted for CrCl increased to 30mg BID 1/13  - Will keep off abx for now if persistently febrile or if worsening shock will start CTX 2g/Azithro 500mg for CAP coverage  - f/u BCx x2 drawn on 1/12   - Urine legionella negative  - MRSA neg  - collect sputum cx if able to produce     ENDO   #Pre-diabetes  Prescribed metformin outpatient, has not been filled. A1c 6.4.  -SSI w premeal finger sticks    #R/o Hypothyroidism   Previously prescribed Synthroid 25mcg qd filled 12/12/25 per the  shop   - TSH 1.18 T4 4.52  - c/w home dose of levothyroxine 25mcg    HEME   #Anemia likely anemia of chronic disease or from sepsis   Hgb 10.3 s/p 3L fluid resuscitation. Possible h/o pernicious anemia per [facility] chart review.   - Folate 6.0, B12 596  - folic acid and MV as above  - Maintain active T&S  - Daily CBC    MSK  #Unsteady gait   - PT/OT consult    Prophylaxis:   F: s/p 500mL LR today  E: Replete K>4 Mg>2 Phos>3  D: Regular w Ensure  GI PPx: Not indicated  VTE PPx: SQH  Code status: Full  Dispo: [id number]    Emergency Contact: Not file  Lines: PIV x 1    Bebeto Lewis [facility]  c: [phone]  p: [phone]

## 2025-01-15 NOTE — PROGRESS NOTE ADULT - ASSESSMENT
MICU     69 y o M PMH (per chart review) EtOH use disorder, depression, former smoker (20 pack year quit 15 years ago), HTN, hypothyroid, CAD, diabetes who was BIBEMS to [facility] from shelter for SOB, cough, fevers, and malaise. Transferred to MICU for septic shock 2/2 influenza now off pressors and weaned to nasal cannula.     NEURO   #MDD  #h/o schizoaffective disorder  Psychiatrist Dr. Corrales. Per chart review, has previously been prescribed Wellbutrin, risperidone, clonazepam, gabapentin; reports only taking Wellbutrin.   Home med: Wellbutrin SR 200mg BID, iSTOP with clonazepam filled monthly last filled 12/18 and per discussion today with patient's PCP he has most recently been rx'd risperidone, clonazepam, Wellbutrin (per the  shop last filled on 12/19/24)  - c/w buproprion 300mg qd (therapeutic interchange for Wellbutrin 150mg BID)   - c/w risperidone 0.5mg HS  - c/w clonazepam 0.5mg HS    #H/o Alcohol use disorder   ~2 drinks per week, Last drink 1/10. Per chart review, was empirically treated for Wernicke’s encephalopathy in 2022 at [place]. Discussed with mary's PCP about AUD and she states he is not forthcoming about his alcohol intake and quantity and frequency is not clear.   - IV thiamine 500mg qd x3d  - c/w folic acid, multivitamin qd  - utox unable to be run x 2 due to acidic urine  - CIWA protocol will consider phenobarb protocol if CIWA > 8     #Chronic Dizziness and unsteady gait  Patient c/o dizziness x 2 weeks. Per patient's PCP he has been chronically been c/o dizziness x months and has been having gait difficulties  - Head CT w Moderate severity chronic white matter microvascular type changes.  - Neuro consult   - c/w meclizine PRN got 1 dose yesterday w some relief  - c/w high dose thiamine  - PT/OT consult     CV   #Septic Shock  #Sinus bradycardia; Resolved  #h/o HTN and HLD  s/p 3L fluids in [facility] ED, remained hypotensive sBP 80s, MAP low 60. TSH wnl. On Lisinopril/HCTZ outpatient  - Wean levo as tolerated, goal MAP >65   - Midodrine increased to 10mg TID 1/13 PM   - Watch HR closesly w initiation of midodrine given h/o bradycardia  - Lipid panel (Total: 85 HDL: 31 LDL: 36) okay to keep of atorvastatin  - Maintain MAP>65   - s/p 500mL LR bolus this morning   - Hold BP meds    #Elevated troponin   #Elevated pro-BNP   Suspect 2/2 demand ischemia iso septic shock and SHIRA. EKG on presentation without signs of acute infarct.   Pro-BNP 3.5k on admission. Repeat EKG on admission shows sinus bradycardia without evidence of ischemia. QTc 414  - Troponin peaked @ 51   - TTE 1/13 normal systolic function    PULMONOLOGY  #Acute hypoxic respiratory failure  #Influenza PNA  CTA: negative for PE but showed bilateral lower lobe mucus plugging as well as bronchial wall thickening.   - Weaned to room air as tolerated, Maintain SpO2 goal >92%   - Duoneb q4h standing  - chest PT BID, incentive spirometry, aerobika   - Rest of plan as below    GI   #Transaminitis  , ALT 89. Ddx hypoperfusion iso septic shock vs alcohol hepatitis  - LFTs continue to downtrend  - Hepatitis panel negative   - RUQ not indicated no RUQ tenderness and LFTs almost normal     #Nutrition; severe, chronic protein-calorie malnutrition  - Regular diet w Ensure supplementation  - RD following and recs appreciated     RENAL/   #SHIRA likely prerenal 2/2 hypovolemia (Resolved)  Cr 1.3-1.4 in 2022 and per PCP 1.27 in August 2024. P/w Cr 2.24, suspect pre-renal iso septic shock and decreased po intake. UA spec grav elevated 1.046, CK 1026, FeNa 0.9%   - BMP daily   - Strict I/O    #Mild Hyponatremia(132) possibly 2/2 SIADH 2/2 influenza; appears euvolemic on exam  -Daily BMP  -If Na continues to downtrend will consider fluid restrictions    INFECTIOUS DISEASE  #Septic shock   Patient met sepsis criteria on admission (HR >90, RR >20). RVP +influenza A. UA neg for UTI. Lactate elevated to 4.6. Hypotension refractory to 3L fluids.    Lactate cleared s/p 3L fluids. s/p CTX 1g and azithromycin in ED. Procal (after receiving abx) 1.05.  - s/p CTX 1g/Azithro 500mg x 1 (1/12)  - c/w Tamiflu 30mg q24h x5d (1/12 - 1/16) - dose adjusted for CrCl increased to 30mg BID 1/13  - Will keep off abx for now if persistently febrile or if worsening shock will start CTX 2g/Azithro 500mg for CAP coverage  - f/u BCx x2 drawn on 1/12   - Urine legionella negative  - MRSA neg  - collect sputum cx if able to produce     ENDO   #Pre-diabetes  Prescribed metformin outpatient, has not been filled. A1c 6.4.  -SSI w premeal finger sticks    #R/o Hypothyroidism   Previously prescribed Synthroid 25mcg qd filled 12/12/25 per the  shop   - TSH 1.18 T4 4.52  - c/w home dose of levothyroxine 25mcg    HEME   #Anemia likely anemia of chronic disease or from sepsis   Hgb 10.3 s/p 3L fluid resuscitation. Possible h/o pernicious anemia per [facility] chart review.   - Folate 6.0, B12 596  - folic acid and MV as above  - Maintain active T&S  - Daily CBC    MSK  #Unsteady gait   - PT/OT consult    Maintainence  F: s/p 500mL LR today  E: Replete K>4 Mg>2 Phos>3  D: Regular w Ensure  GI PPx: Not indicated  VTE PPx: SQH  Code status: Full  Emergency Contact: Not file  Dispo: MICU; Possible SD to [facility] this afternoon  Lines: PIV x 1    Bebeto Lewis [facility]  c: [phone]  p: [phone]        Nutritional Assessment:  · Nutritional Assessment	This patient has been assessed with a concern for Malnutrition and has been determined to have a diagnosis/diagnoses of Severe protein-calorie malnutrition.    This patient is being managed with:   Diet Regular-  1500mL Fluid Restriction ([id number])  Supplement Feeding Modality:  Oral  Ensure Plus High Protein Cans or Servings Per Day:  1       Frequency:  Two Times a day  Entered: Jan 14 2025  6:15PM

## 2025-01-15 NOTE — PROGRESS NOTE ADULT - SUBJECTIVE AND OBJECTIVE BOX
Neurology Progress Note    Interval History:  The patient was seen and examined at the bedside. Feeling well, no complaints while sitting down.       PAST MEDICAL & SURGICAL HISTORY:  H/O: depression            Medications:  albuterol/ipratropium for Nebulization 3 milliLiter(s) Nebulizer every 4 hours  buPROPion XL (24-Hour) . 300 milliGRAM(s) Oral daily  chlorhexidine 2% Cloths 1 Application(s) Topical <User Schedule>  cholecalciferol 1000 Unit(s) Oral daily  clonazePAM  Tablet 0.5 milliGRAM(s) Oral at bedtime  dextrose 5%. 1000 milliLiter(s) IV Continuous <Continuous>  dextrose 5%. 1000 milliLiter(s) IV Continuous <Continuous>  dextrose 50% Injectable 25 Gram(s) IV Push once  dextrose 50% Injectable 12.5 Gram(s) IV Push once  dextrose 50% Injectable 25 Gram(s) IV Push once  dextrose Oral Gel 15 Gram(s) Oral once PRN  enoxaparin Injectable 40 milliGRAM(s) SubCutaneous every 24 hours  fludroCORTISONE 0.1 milliGRAM(s) Oral every 24 hours  folic acid 1 milliGRAM(s) Oral daily  glucagon  Injectable 1 milliGRAM(s) IntraMuscular once  influenza  Vaccine (HIGH DOSE) 0.5 milliLiter(s) IntraMuscular once  insulin lispro (ADMELOG) corrective regimen sliding scale   SubCutaneous Before meals and at bedtime  levothyroxine 25 MICROGram(s) Oral daily  meclizine 25 milliGRAM(s) Oral every 12 hours PRN  midodrine 10 milliGRAM(s) Oral every 8 hours  multivitamin  Chewable 1 Tablet(s) Oral daily  oseltamivir 30 milliGRAM(s) Oral every 12 hours  polyethylene glycol 3350 17 Gram(s) Oral daily  risperiDONE   Tablet 0.5 milliGRAM(s) Oral at bedtime  senna 2 Tablet(s) Oral at bedtime      Vital Signs Last 24 Hrs  T(C): 36.9 (15 [place] 2025 09:56), Max: 37.1 (15 [place] 2025 05:21)  T(F): 98.5 (15 [place] 2025 09:56), Max: 98.8 (15 [place] 2025 05:21)  HR: 63 (15 [place] 2025 13:00) (56 - 77)  BP: 85/61 (15 [place] 2025 13:00) (76/51 - 129/70)  BP(mean): 69 (15 [place] 2025 13:00) (60 - 103)  RR: 23 (15 [place] 2025 13:00) (16 - 29)  SpO2: 98% (15 [place] 2025 13:00) (94% - 98%)    Parameters below as of 15 [place] 2025 13:00  Patient On (Oxygen Delivery Method): room air      Neurologic:   -Mental status: Awake, alert, oriented to person, place, and time. Speech is fluent with intact naming, repetition, and comprehension, no dysarthria.Follows commands. Attention/concentration intact.  -Cranial nerves:   II: Visual fields are full to confrontation.  III, IV, VI: Extraocular movements are intact without nystagmus. Pupils equally round and reactive to light  V:  Facial sensation V1-V3 equal and intact   VII: Face is symmetric with normal eye closure and smile  VIII: Hearing is bilaterally intact to finger rub  IX, X: Uvula is midline and soft palate rises symmetrically  XI: Head turning and shoulder shrug are intact.  XII: Tongue protrudes midline  Motor: Normal bulk and tone. No pronator drift. Strength bilateral upper extremity 5/5, bilateral lower extremities 5/5.  Rapid alternating movements intact and symmetric  Sensation: Intact to light touch bilaterally. No neglect or extinction on double simultaneous testing.  Coordination: No dysmetria on finger-to-nose and heel-to-shin bilaterally No dysdiadochokinesia   Reflexes: Downgoing toes bilaterally. DTRs 2+ throughout  Gait: Slightly wide based with walker. Romberg deferred for safety.    Labs:  CBC Full  -  ( 15 [place] 2025 05:30 )  WBC Count : 7.26 K/uL  RBC Count : 3.32 M/uL  Hemoglobin : 10.7 g/dL  Hematocrit : 31.5 %  Platelet Count - Automated : 183 K/uL  Mean Cell Volume : 94.9 fl  Mean Cell Hemoglobin : 32.2 pg  Mean Cell Hemoglobin Concentration : 34.0 g/dL  Auto Neutrophil # : 3.98 K/uL  Auto Lymphocyte # : 2.60 K/uL  Auto Monocyte # : 0.49 K/uL  Auto Eosinophil # : 0.14 K/uL  Auto Basophil # : 0.03 K/uL  Auto Neutrophil % : 54.9 %  Auto Lymphocyte % : 35.8 %  Auto Monocyte % : 6.7 %  Auto Eosinophil % : 1.9 %  Auto Basophil % : 0.4 %    01-15    134[L]  |  101  |  15  ----------------------------<  103[H]  3.9   |  20[L]  |  1.18    Ca    7.5[L]      15 [place] 2025 05:30  Phos  1.9     01-15  Mg     1.7     01-15    TPro  5.3[L]  /  Alb  3.0[L]  /  TBili  0.3  /  DBili  x   /  AST  41[H]  /  ALT  39  /  AlkPhos  68  01-15    LIVER FUNCTIONS - ( 15 [place] 2025 05:30 )  Alb: 3.0 g/dL / Pro: 5.3 g/dL / ALK PHOS: 68 U/L / ALT: 39 U/L / AST: 41 U/L / GGT: x             Urinalysis Basic - ( 15 [place] 2025 05:30 )    Color: x / Appearance: x / SG: x / pH: x  Gluc: 103 mg/dL / Ketone: x  / Bili: x / Urobili: x   Blood: x / Protein: x / Nitrite: x   Leuk Esterase: x / RBC: x / WBC x   Sq Epi: x / Non Sq Epi: x / Bacteria: x        Assessment:  This is a 69y Male w/ h/o     Plan: Neurology Progress Note    Interval History:  The patient was seen and examined at the bedside. Feeling well, no complaints while sitting down. He endorsed continued dizziness after standing up during the afternoon, also endorsed some memory issues    PAST MEDICAL & SURGICAL HISTORY:  H/O: depression            Medications:  albuterol/ipratropium for Nebulization 3 milliLiter(s) Nebulizer every 4 hours  buPROPion XL (24-Hour) . 300 milliGRAM(s) Oral daily  chlorhexidine 2% Cloths 1 Application(s) Topical <User Schedule>  cholecalciferol 1000 Unit(s) Oral daily  clonazePAM  Tablet 0.5 milliGRAM(s) Oral at bedtime  dextrose 5%. 1000 milliLiter(s) IV Continuous <Continuous>  dextrose 5%. 1000 milliLiter(s) IV Continuous <Continuous>  dextrose 50% Injectable 25 Gram(s) IV Push once  dextrose 50% Injectable 12.5 Gram(s) IV Push once  dextrose 50% Injectable 25 Gram(s) IV Push once  dextrose Oral Gel 15 Gram(s) Oral once PRN  enoxaparin Injectable 40 milliGRAM(s) SubCutaneous every 24 hours  fludroCORTISONE 0.1 milliGRAM(s) Oral every 24 hours  folic acid 1 milliGRAM(s) Oral daily  glucagon  Injectable 1 milliGRAM(s) IntraMuscular once  influenza  Vaccine (HIGH DOSE) 0.5 milliLiter(s) IntraMuscular once  insulin lispro (ADMELOG) corrective regimen sliding scale   SubCutaneous Before meals and at bedtime  levothyroxine 25 MICROGram(s) Oral daily  meclizine 25 milliGRAM(s) Oral every 12 hours PRN  midodrine 10 milliGRAM(s) Oral every 8 hours  multivitamin  Chewable 1 Tablet(s) Oral daily  oseltamivir 30 milliGRAM(s) Oral every 12 hours  polyethylene glycol 3350 17 Gram(s) Oral daily  risperiDONE   Tablet 0.5 milliGRAM(s) Oral at bedtime  senna 2 Tablet(s) Oral at bedtime      Vital Signs Last 24 Hrs  T(C): 36.9 (15 [place] 2025 09:56), Max: 37.1 (15 [place] 2025 05:21)  T(F): 98.5 (15 [place] 2025 09:56), Max: 98.8 (15 [place] 2025 05:21)  HR: 63 (15 [place] 2025 13:00) (56 - 77)  BP: 85/61 (15 [place] 2025 13:00) (76/51 - 129/70)  BP(mean): 69 (15 [place] 2025 13:00) (60 - 103)  RR: 23 (15 [place] 2025 13:00) (16 - 29)  SpO2: 98% (15 [place] 2025 13:00) (94% - 98%)    Parameters below as of 15 [place] 2025 13:00  Patient On (Oxygen Delivery Method): room air      Neurologic:   -Mental status: Awake, alert, oriented to person, place, and time. Speech is fluent with intact naming, repetition, and comprehension, no dysarthria. Follows commands. Attention/concentration intact. immediate recall impaired (3/5, 5/5), short term recall impaired (1/5), long term recall intact. No motor apraxia  -Cranial nerves:   II: Visual fields are full to confrontation.  III, IV, VI: Extraocular movements are intact without nystagmus. No oculomotor apraxia. Pupils equally round and reactive to light.  V:  Facial sensation V1-V3 equal and intact   VII: Face is symmetric with normal eye closure and smile  VIII: Hearing is bilaterally intact to finger rub  IX, X: Uvula is midline and soft palate rises symmetrically  XI: Head turning and shoulder shrug are intact.  XII: Tongue protrudes midline  Motor: Normal bulk and tone. No pronator drift. Strength bilateral upper extremity 5/5, bilateral lower extremities 5/5.  Rapid alternating movements intact and symmetric  Sensation: Intact to light touch bilaterally. No neglect or extinction on double simultaneous testing.  Coordination: No dysmetria on finger-to-nose and heel-to-shin bilaterally. No dysdiadochokinesia   Reflexes: Downgoing toes bilaterally. DTRs 2+ throughout  Gait: Slightly wide based with walker. Romberg deferred for safety.    MOCA: 19/30 (Notably, short term memory recall 1/5, and 0/5 visuospatial, although he didn't have his reading glasses, which confounds the visuospatial exam); included additional point for 12th grade level of educaiton    Labs:  CBC Full  -  ( 15 [place] 2025 05:30 )  WBC Count : 7.26 K/uL  RBC Count : 3.32 M/uL  Hemoglobin : 10.7 g/dL  Hematocrit : 31.5 %  Platelet Count - Automated : 183 K/uL  Mean Cell Volume : 94.9 fl  Mean Cell Hemoglobin : 32.2 pg  Mean Cell Hemoglobin Concentration : 34.0 g/dL  Auto Neutrophil # : 3.98 K/uL  Auto Lymphocyte # : 2.60 K/uL  Auto Monocyte # : 0.49 K/uL  Auto Eosinophil # : 0.14 K/uL  Auto Basophil # : 0.03 K/uL  Auto Neutrophil % : 54.9 %  Auto Lymphocyte % : 35.8 %  Auto Monocyte % : 6.7 %  Auto Eosinophil % : 1.9 %  Auto Basophil % : 0.4 %    01-15    134[L]  |  101  |  15  ----------------------------<  103[H]  3.9   |  20[L]  |  1.18    Ca    7.5[L]      15 [place] 2025 05:30  Phos  1.9     01-15  Mg     1.7     01-15    TPro  5.3[L]  /  Alb  3.0[L]  /  TBili  0.3  /  DBili  x   /  AST  41[H]  /  ALT  39  /  AlkPhos  68  01-15    LIVER FUNCTIONS - ( 15 [place] 2025 05:30 )  Alb: 3.0 g/dL / Pro: 5.3 g/dL / ALK PHOS: 68 U/L / ALT: 39 U/L / AST: 41 U/L / GGT: x             Urinalysis Basic - ( 15 [place] 2025 05:30 )    Color: x / Appearance: x / SG: x / pH: x  Gluc: 103 mg/dL / Ketone: x  / Bili: x / Urobili: x   Blood: x / Protein: x / Nitrite: x   Leuk Esterase: x / RBC: x / WBC x   Sq Epi: x / Non Sq Epi: x / Bacteria: x        Assessment:  This is a 69y Male w/ h/o     Plan:

## 2025-01-15 NOTE — PROGRESS NOTE ADULT - SUBJECTIVE AND OBJECTIVE BOX
************INCOMPLETE****************      69y old  Male who presents with a chief complaint of fever, cough, SOB (13 Jan 2025 07:14)    ED Course:   Mr. Robb presented to [facility] ED on 1/12 BIBEMS w c/o generalized weakness x the AM, shortness of breath, cough and tremors. SPO2 by EMS reportedly 90% on RA so he was placed on 2LPM NC. While in ED vital signs initially 100.1f (rectal), , BP 85/53, RR 21, SPO2 92% on 2 LPM with labs s/f BUN 25, Cr 2.234, , ALT 89, initial lactate of 4.6, troponin of 89, proBNP 3520 and RVP +influenza A. CTA of chest negative for PE but with b/l lower lobe mucus plugging. He was given CTX/Azithro, tamiflu, Duonebs, 3L LR, Mg 2g and tylenol. His lactate cleared but he remained hypotensive so was started on norepinephrine and transferred to MICU at [facility]     Hospital/MICU Course (1/13-  ):   Admitted to MICU on 1/13 arrived from [facility] on Norepi about 1am and was initially titrated off but became hypotensive to 70s/50s so NE was resumed and patient had brief episode of bradycardia to 30s with possible junctional rhythm which self-resolved. His Cr downtrended to 1.4, outpatient psych regimen restarted after getting collateral from PCP, midodrine started to wean pressors, only received abx in ED as septic shock likely 2/2 influenza and no pna on POCUS or CXR.     Interval History/Overnight event: Midodrine up titrated to 10mg TID, weaned off norepinephrine overnight. On exam this morning patient w/o any complaints, states he feels "a little better" than when he arrived in the hospital and c/o inability to sleep because "they kept waking me up over night".      Subjective: Patient seen and examined at bedside.  REVIEW OF SYSTEMS:   CONSTITUTIONAL:  +Generalized weakness. Denies fevers/chills   EYES/ENT: +Dizziness with position changes and c/o "ear fullness". Denies visual changes, ear pain or throat pain   NECK: Denies pain or stiffness  RESPIRATORY: +cough nonproductive.  Denies SOB  CARDIOVASCULAR:  Denies chest pain or palpitations  GASTROINTESTINAL: +Increase in appetite this morning. Denies abdominal pain, nausea, vomiting, diarrhea or constipation.   GENITOURINARY: Denies dysuria, change in frequency, or hematuria  NEUROLOGICAL: +dizziness. Denies numbness.  SKIN:  Denies itching or rashes    Objective:   ICU Vital Signs Last 24 Hrs  T(C): 36.6 (14 Jan 2025 04:35), Max: 36.8 (13 Jan 2025 22:00)  T(F): 97.8 (14 Jan 2025 04:35), Max: 98.2 (13 Jan 2025 22:00)  HR: 75 (14 Jan 2025 08:00) (52 - 84)  BP: 92/69 (14 Jan 2025 08:00) (74/53 - 114/81)  BP(mean): 77 (14 Jan 2025 08:00) (60 - 93)  ABP: --  ABP(mean): --  RR: 27 (14 Jan 2025 08:00) (17 - 30)  SpO2: 99% (14 Jan 2025 08:00) (82% - 100%)  O2 Parameters below as of 14 Jan 2025 08:00  Patient On (Oxygen Delivery Method): room air    I&O's Summary  13 Jan 2025 07:01  -  14 Jan 2025 07:00  --------------------------------------------------------  IN: 2160.3 mL / OUT: 2200 mL / NET: -39.7 mL    PHYSICAL EXAM:   General: Elderly male in hospital bed appearing comfortable laying in bed, speaking in full sentences  HEENT: head NC/AT, no conjunctival injection, EOMI, MMM  Neck: supple, no JVD  Cardio: RRR, +S1/S2, no M/R/G  Resp: b/l expiratory wheeze to auscultation, +nonproductive cough. No rales/rhonchi, negative egophony and no and no retractions or accessory muscle use.   Abdo: soft, NT, ND, +bowel sounds x4  Extremities: WWP, no edema/cyanosis/clubbing   Vasc: 2+ radial and DP pulses b/l  Neuro: A&Ox4, nonfocal, moving all extremities, equal strength b/l, unsteady gait w transfer from bed to chair.  Psych: speech non-pressured, thoughts goal-oriented  Skin: dry, intact, no visible jaundice   Lines: PIV x1 R FA CDI    LABS:              11.7   6.15  )-----------( 209      ( 14 Jan 2025 05:30 )             34.4     01-14  133[L]  |  99  |  12  ----------------------------<  103[H]  3.8   |  22  |  1.20    Ca    7.7[L]      14 Jan 2025 05:30  Phos  5.0     01-14  Mg     1.9     01-14  TPro  5.5[L]  /  Alb  3.1[L]  /  TBili  0.2  /  DBili  x   /  AST  72[H]  /  ALT  58[H]  /  AlkPhos  64  01-14  PT/INR - ( 13 Jan 2025 05:30 )   PT: 13.2 sec;   INR: 1.15      PTT - ( 13 Jan 2025 05:30 )  PTT:37.9 sec    Urinalysis Basic - ( 14 Jan 2025 05:30 )  Color: x / Appearance: x / SG: x / pH: x  Gluc: 103 mg/dL / Ketone: x  / Bili: x / Urobili: x   Blood: x / Protein: x / Nitrite: x   Leuk Esterase: x / RBC: x / WBC x   Sq Epi: x / Non Sq Epi: x / Bacteria: x    CAPILLARY BLOOD GLUCOSE  POCT Blood Glucose.: 98 mg/dL (14 Jan 2025 06:43)  POCT Blood Glucose.: 127 mg/dL (13 Jan 2025 22:39)  POCT Blood Glucose.: 104 mg/dL (13 Jan 2025 16:30)    Micro:  COVID/Flu/RSV 1/12: +Influenza A   Legionella Urine 1/13: Negative  Blood Culture Results:   No growth at 24 hours (01-12-25 @ 16:50)  Culture Results:   No growth at 24 hours (01-12-25 @ 16:50)    RADIOLOGY & ADDITIONAL TESTS:  CTHead 1/13:  IMPRESSION: No acute intracranial hemorrhage, mass effect, or shift of   the midline structures.  Moderate severity chronic white matter microvascular type changes.    TTE 1/13:   CONCLUSIONS:   1. Normal left and right ventricular size and systolic function.   2. No significant valvular disease.   3. No evidence of pulmonary hypertension.   4. No pericardial effusion.   5. No prior echo is available for comparison.  CTA Chest 1/12: IMPRESSION:  No evidence of acute pulmonary.  Bilateral perinephric bronchial thickening particularly in the lower lobes with multifocal areas of mucous plugging in the bilateral lower lobes. Minimal bibasilar atelectasis. No segmental consolidations.    Chest XR 1/12:  IMPRESSION: The heart and lungs are normal. Chronic bilateral clavicle fractures.    Consultant(s) Notes Reviewed:  [x ] YES  [ ] NO    MEDICATIONS  (STANDING):  albuterol/ipratropium for Nebulization 3 milliLiter(s) Nebulizer every 4 hours  buPROPion XL (24-Hour) . 300 milliGRAM(s) Oral daily  chlorhexidine 2% Cloths 1 Application(s) Topical <User Schedule>  cholecalciferol 1000 Unit(s) Oral daily  clonazePAM  Tablet 0.5 milliGRAM(s) Oral at bedtime  dextrose 5%. 1000 milliLiter(s) (50 mL/Hr) IV Continuous <Continuous>  dextrose 5%. 1000 milliLiter(s) (100 mL/Hr) IV Continuous <Continuous>  dextrose 50% Injectable 25 Gram(s) IV Push once  dextrose 50% Injectable 12.5 Gram(s) IV Push once  dextrose 50% Injectable 25 Gram(s) IV Push once  folic acid 1 milliGRAM(s) Oral daily  glucagon  Injectable 1 milliGRAM(s) IntraMuscular once  heparin   Injectable 5000 Unit(s) SubCutaneous every 8 hours  influenza  Vaccine (HIGH DOSE) 0.5 milliLiter(s) IntraMuscular once  insulin lispro (ADMELOG) corrective regimen sliding scale   SubCutaneous Before meals and at bedtime  levothyroxine 25 MICROGram(s) Oral daily  midodrine 10 milliGRAM(s) Oral every 8 hours  multivitamin  Chewable 1 Tablet(s) Oral daily  norepinephrine Infusion 0.05 MICROgram(s)/kG/Min (5.53 mL/Hr) IV Continuous <Continuous>  oseltamivir 30 milliGRAM(s) Oral every 12 hours  polyethylene glycol 3350 17 Gram(s) Oral daily  risperiDONE   Tablet 0.5 milliGRAM(s) Oral at bedtime  senna 2 Tablet(s) Oral at bedtime  thiamine IVPB 500 milliGRAM(s) IV Intermittent every 8 hours    Care Discussed with Consultants/Other Providers [ x] YES  [ ] NO   ************INCOMPLETE****************    STEPDOWN MICU to [facility]    69y old  Male who presents with a chief complaint of fever, cough, SOB (13 Jan 2025 07:14)    ED Course:   Mr. Robb presented to [facility] ED on 1/12 BIBEMS w c/o generalized weakness x the AM, shortness of breath, cough and tremors. SPO2 by EMS reportedly 90% on RA so he was placed on 2LPM NC. While in ED vital signs initially 100.1f (rectal), , BP 85/53, RR 21, SPO2 92% on 2 LPM with labs s/f BUN 25, Cr 2.234, , ALT 89, initial lactate of 4.6, troponin of 89, proBNP 3520 and RVP +influenza A. CTA of chest negative for PE but with b/l lower lobe mucus plugging. He was given CTX/Azithro, tamiflu, Duonebs, 3L LR, Mg 2g and tylenol. His lactate cleared but he remained hypotensive so was started on norepinephrine and transferred to MICU at [facility]     Hospital/MICU Course (1/13-1/15/2025):   Admitted to MICU on 1/13 arrived from [facility] on Norepi about 1am and was initially titrated off but became hypotensive to 70s/50s so NE was resumed and patient had brief episode of bradycardia to 30s with possible junctional rhythm which self-resolved. His Cr downtrended to 1.4, outpatient psych regimen restarted after getting collateral from PCP, midodrine started to wean off pressors, only received abx in ED as septic shock likely 2/2 influenza and no pna on POCUS or CXR.    Interval History/Overnight event: Remained off pressors overnight, however MAP 60 so Midodrine uptitrated to 10mg TID. On exam today pt reports feeling, "better" and "ready to go", reporting improvement in SOB/WOB and offered no complaints.      ************INCOMPLETE****************        Subjective: Patient seen and examined at bedside.  REVIEW OF SYSTEMS:   CONSTITUTIONAL:  +Denies weakness/lethargy. Denies fevers/chills  EYES/ENT: +Dizziness with position changes and c/o "ear fullness", however no reported episodes since yesterday. Denies visual changes, ear pain or throat pain   NECK: Denies pain or stiffness  RESPIRATORY: +cough nonproductive.  Denies SOB  CARDIOVASCULAR:  Denies chest pain or palpitations  GASTROINTESTINAL: Tolerated diet this AM. Denies abdominal pain, nausea, vomiting, diarrhea or constipation.   GENITOURINARY: Denies dysuria, change in frequency, or hematuria  NEUROLOGICAL: +dizziness. Denies numbness.  SKIN:  Denies itching or rashes    Objective:   ICU Vital Signs Last 24 Hrs  T(C): 36.6 (14 Jan 2025 04:35), Max: 36.8 (13 Jan 2025 22:00)  T(F): 97.8 (14 Jan 2025 04:35), Max: 98.2 (13 Jan 2025 22:00)  HR: 75 (14 Jan 2025 08:00) (52 - 84)  BP: 92/69 (14 Jan 2025 08:00) (74/53 - 114/81)  BP(mean): 77 (14 Jan 2025 08:00) (60 - 93)  ABP: --  ABP(mean): --  RR: 27 (14 Jan 2025 08:00) (17 - 30)  SpO2: 99% (14 Jan 2025 08:00) (82% - 100%)  O2 Parameters below as of 14 Jan 2025 08:00  Patient On (Oxygen Delivery Method): room air    I&O's Summary  13 Jan 2025 07:01  -  14 Jan 2025 07:00  --------------------------------------------------------  IN: 2160.3 mL / OUT: 2200 mL / NET: -39.7 mL    PHYSICAL EXAM:   General: Elderly male in hospital bed appearing comfortable laying in bed, speaking in full sentences  HEENT: head NC/AT, no conjunctival injection, EOMI, MMM  Neck: supple, no JVD  Cardio: RRR, +S1/S2, no M/R/G  Resp: b/l expiratory wheeze to auscultation, +nonproductive cough. No rales/rhonchi, negative egophony and no and no retractions or accessory muscle use.   Abdo: soft, NT, ND, +bowel sounds x4  Extremities: WWP, no edema/cyanosis/clubbing   Vasc: 2+ radial and DP pulses b/l  Neuro: A&Ox4, nonfocal, moving all extremities, equal strength b/l, unsteady gait w transfer from bed to chair.  Psych: speech non-pressured, thoughts goal-oriented  Skin: dry, intact, no visible jaundice   Lines: PIV x1 R FA CDI    LABS:              11.7   6.15  )-----------( 209      ( 14 Jan 2025 05:30 )             34.4     01-14  133[L]  |  99  |  12  ----------------------------<  103[H]  3.8   |  22  |  1.20    Ca    7.7[L]      14 Jan 2025 05:30  Phos  5.0     01-14  Mg     1.9     01-14  TPro  5.5[L]  /  Alb  3.1[L]  /  TBili  0.2  /  DBili  x   /  AST  72[H]  /  ALT  58[H]  /  AlkPhos  64  01-14  PT/INR - ( 13 Jan 2025 05:30 )   PT: 13.2 sec;   INR: 1.15      PTT - ( 13 Jan 2025 05:30 )  PTT:37.9 sec    Urinalysis Basic - ( 14 Jan 2025 05:30 )  Color: x / Appearance: x / SG: x / pH: x  Gluc: 103 mg/dL / Ketone: x  / Bili: x / Urobili: x   Blood: x / Protein: x / Nitrite: x   Leuk Esterase: x / RBC: x / WBC x   Sq Epi: x / Non Sq Epi: x / Bacteria: x    CAPILLARY BLOOD GLUCOSE  POCT Blood Glucose.: 98 mg/dL (14 Jan 2025 06:43)  POCT Blood Glucose.: 127 mg/dL (13 Jan 2025 22:39)  POCT Blood Glucose.: 104 mg/dL (13 Jan 2025 16:30)    Micro:  COVID/Flu/RSV 1/12: +Influenza A   Legionella Urine 1/13: Negative  Blood Culture Results:   No growth at 24 hours (01-12-25 @ 16:50)  Culture Results:   No growth at 24 hours (01-12-25 @ 16:50)    RADIOLOGY & ADDITIONAL TESTS:  CTHead 1/13:  IMPRESSION: No acute intracranial hemorrhage, mass effect, or shift of   the midline structures.  Moderate severity chronic white matter microvascular type changes.    TTE 1/13:   CONCLUSIONS:   1. Normal left and right ventricular size and systolic function.   2. No significant valvular disease.   3. No evidence of pulmonary hypertension.   4. No pericardial effusion.   5. No prior echo is available for comparison.  CTA Chest 1/12: IMPRESSION:  No evidence of acute pulmonary.  Bilateral perinephric bronchial thickening particularly in the lower lobes with multifocal areas of mucous plugging in the bilateral lower lobes. Minimal bibasilar atelectasis. No segmental consolidations.    Chest XR 1/12:  IMPRESSION: The heart and lungs are normal. Chronic bilateral clavicle fractures.    Consultant(s) Notes Reviewed:  [x ] YES  [ ] NO    MEDICATIONS  (STANDING):  albuterol/ipratropium for Nebulization 3 milliLiter(s) Nebulizer every 4 hours  buPROPion XL (24-Hour) . 300 milliGRAM(s) Oral daily  chlorhexidine 2% Cloths 1 Application(s) Topical <User Schedule>  cholecalciferol 1000 Unit(s) Oral daily  clonazePAM  Tablet 0.5 milliGRAM(s) Oral at bedtime  dextrose 5%. 1000 milliLiter(s) (50 mL/Hr) IV Continuous <Continuous>  dextrose 5%. 1000 milliLiter(s) (100 mL/Hr) IV Continuous <Continuous>  dextrose 50% Injectable 25 Gram(s) IV Push once  dextrose 50% Injectable 12.5 Gram(s) IV Push once  dextrose 50% Injectable 25 Gram(s) IV Push once  folic acid 1 milliGRAM(s) Oral daily  glucagon  Injectable 1 milliGRAM(s) IntraMuscular once  heparin   Injectable 5000 Unit(s) SubCutaneous every 8 hours  influenza  Vaccine (HIGH DOSE) 0.5 milliLiter(s) IntraMuscular once  insulin lispro (ADMELOG) corrective regimen sliding scale   SubCutaneous Before meals and at bedtime  levothyroxine 25 MICROGram(s) Oral daily  midodrine 10 milliGRAM(s) Oral every 8 hours  multivitamin  Chewable 1 Tablet(s) Oral daily  norepinephrine Infusion 0.05 MICROgram(s)/kG/Min (5.53 mL/Hr) IV Continuous <Continuous>  oseltamivir 30 milliGRAM(s) Oral every 12 hours  polyethylene glycol 3350 17 Gram(s) Oral daily  risperiDONE   Tablet 0.5 milliGRAM(s) Oral at bedtime  senna 2 Tablet(s) Oral at bedtime  thiamine IVPB 500 milliGRAM(s) IV Intermittent every 8 hours    Care Discussed with Consultants/Other Providers [ x] YES  [ ] NO   STEPDOWN MICU to [facility]    68 y/o Male w/ PMHx of AUD, ?schizoaffective disorder, CAD, , HTN, ?CKD, and DM who initially presented to [facility] ED [facility] from shelter for SOB, cough, fever and malaise and was subsequently admitted to [facility] MICU for septic shock 2/2 influenza requiring pressor support.    ED Course:   Mr. Robb presented to [facility] ED on 1/12 BIBEMS w c/o generalized weakness x the AM, shortness of breath, cough and tremors. SPO2 by EMS reportedly 90% on RA so he was placed on 2LPM NC. While in ED vital signs initially 100.1f (rectal), , BP 85/53, RR 21, SPO2 92% on 2 LPM with labs s/f BUN 25, Cr 2.234, , ALT 89, initial lactate of 4.6, troponin of 89, proBNP 3520 and RVP +influenza A. CTA of chest negative for PE but with b/l lower lobe mucus plugging. He was given CTX/Azithro, tamiflu, Duonebs, 3L LR, Mg 2g and tylenol. His lactate cleared but he remained hypotensive so was started on norepinephrine and transferred to MICU at [facility]     Hospital/MICU Course (1/13-1/15/2025):   68 y/o Male w/ PMHx of AUD, ?schizoaffective disorder, CAD, , HTN, ?CKD, and DM who initially presented to [facility] ED [facility] from shelter for SOB, cough, fever and malaise. Labs significant for lactic acidosis, pre-renal SHIRA, and transaminitis c/w septic shock, pt was hypotensive to SBP 80s and MAP 60 s/p 3L LR after which pt was started on Levo, given CTX x1, Azithro x1, was started on Tamiflu and admitted to MICU. Since admission to MICU pt has been continue on Tamiflu and monitored off Abx w/ BCx NGTD, UA neg, UStrep neg, ULeg neg, MRSA neg. TTE 1/13 unremarkable. Pt was started on Midodrine w/ subsequently weaned off as of 5am 1/14, after which pt received 1L LR and Midodrine was uptitrated to 10mg q8hrs for SBP 80s, MAP 60 today (1/15). Hospital course c/b pt reporting dizziness w/ changes in position for which Neuro was consulted, pt was started on Florinef, high dose thiamine, underwent head CT showing chronic microvascular white matter changes, and pt is now pending EEG to assess for cerebral dysfunction/slowing and MRI head/neck. PT/OT consulted and rec TARYN.      Interval History/Overnight event: Remained off pressors overnight, however still w/ soft BP's (SBP 80-90s, MAP 60-65) so Midodrine uptitrated to 10mg TID. On exam today pt reports feeling, "better" and "ready to go", reporting improvement in SOB/WOB and offered no complaints. Hypophosphatemia repleted.      Subjective: Patient seen and examined at bedside.  REVIEW OF SYSTEMS:   CONSTITUTIONAL:  +Denies weakness/lethargy. Denies fevers/chills  EYES/ENT: +Dizziness with position changes and c/o "ear fullness", however denies any episodes today. Denies visual changes, ear pain or throat pain   NECK: Denies pain or stiffness  RESPIRATORY: +cough nonproductive.  Denies SOB  CARDIOVASCULAR:  Denies chest pain or palpitations  GASTROINTESTINAL: Tolerated diet this AM. Denies abdominal pain, nausea, vomiting, diarrhea or constipation.   GENITOURINARY: Denies dysuria, change in frequency, or hematuria  NEUROLOGICAL: +intermittent dizziness related to change in position, however denies any episodes today (1/15). Denies numbness.  SKIN:  Denies itching or rashes    Objective:   ICU Vital Signs Last 24 Hrs  T(C): 36.9 (15 [place] 2025 09:56), Max: 37.1 (15 [place] 2025 05:21)  T(F): 98.5 (15 [place] 2025 09:56), Max: 98.8 (15 [place] 2025 05:21)  HR: 63 (15 [place] 2025 13:00) (56 - 77)  BP: 85/61 (15 [place] 2025 13:00) (76/51 - 129/70)  BP(mean): 69 (15 [place] 2025 13:00) (60 - 103)  ABP: --  ABP(mean): --  RR: 23 (15 [place] 2025 13:00) (16 - 29)  SpO2: 98% (15 [place] 2025 13:00) (94% - 98%)    O2 Parameters below as of 15 [place] 2025 13:00  Patient On (Oxygen Delivery Method): room air      I&O's Summary    14 Jan 2025 07:01  -  15 [place] 2025 07:00  --------------------------------------------------------  IN: 2145 mL / OUT: 1900 mL / NET: 245 mL    15 [place] 2025 07:01  -  15 [place] 2025 13:30  --------------------------------------------------------  IN: 999 mL / OUT: 350 mL / NET: 649 mL        PHYSICAL EXAM:   General: Elderly male in hospital bed appearing comfortable laying in bed, speaking in full sentences, in no acute distress  HEENT: head NC/AT, no conjunctival injection, EOMI, MMM  Neck: supple, no JVD  Cardio: RRR, +S1/S2, no M/R/G  Resp: LS CTA b/l, +nonproductive cough. No rales/rhonchi/wheezing, no retractions or accessory muscle use.   Abdo: soft, NT, ND, +bowel sounds x4  Extremities: WWP, no edema/cyanosis/clubbing   Vasc: 2+ radial and DP pulses b/l  Neuro: A&Ox4, nonfocal, moving all extremities, equal strength b/l, unsteady gait w transfer from bed to chair.  Psych: speech non-pressured, thoughts goal-oriented, grossly unremarkable.  Skin: dry, intact, no jaundice   Lines: PIV x1 R forearm dressing c/d/i    LABS:                LABS:                        10.7   7.26  )-----------( 183      ( 15 [place] 2025 05:30 )             31.5     01-15    134[L]  |  101  |  15  ----------------------------<  103[H]  3.9   |  20[L]  |  1.18    Ca    7.5[L]      15 [place] 2025 05:30  Phos  1.9     01-15  Mg     1.7     01-15    TPro  5.3[L]  /  Alb  3.0[L]  /  TBili  0.3  /  DBili  x   /  AST  41[H]  /  ALT  39  /  AlkPhos  68  01-15        LIVER FUNCTIONS - ( 15 [place] 2025 05:30 )  Alb: 3.0 g/dL / Pro: 5.3 g/dL / ALK PHOS: 68 U/L / ALT: 39 U/L / AST: 41 U/L / GGT: x             Urinalysis Basic - ( 15 [place] 2025 05:30 )    Color: x / Appearance: x / SG: x / pH: x  Gluc: 103 mg/dL / Ketone: x  / Bili: x / Urobili: x   Blood: x / Protein: x / Nitrite: x   Leuk Esterase: x / RBC: x / WBC x   Sq Epi: x / Non Sq Epi: x / Bacteria: x        RADIOLOGY & ADDITIONAL TESTS:  CTHead 1/13:  IMPRESSION: No acute intracranial hemorrhage, mass effect, or shift of   the midline structures.  Moderate severity chronic white matter microvascular type changes.    TTE 1/13:   CONCLUSIONS:   1. Normal left and right ventricular size and systolic function.   2. No significant valvular disease.   3. No evidence of pulmonary hypertension.   4. No pericardial effusion.   5. No prior echo is available for comparison.    CTA Chest 1/12: IMPRESSION:  No evidence of acute pulmonary.  Bilateral perinephric bronchial thickening particularly in the lower lobes with multifocal areas of mucous plugging in the bilateral lower lobes. Minimal bibasilar atelectasis. No segmental consolidations.    Chest XR 1/12:  IMPRESSION: The heart and lungs are normal. Chronic bilateral clavicle fractures.    Consultant(s) Notes Reviewed:  [x ] YES  [ ] NO    MEDICATIONS  (STANDING):  albuterol/ipratropium for Nebulization 3 milliLiter(s) Nebulizer every 4 hours  buPROPion XL (24-Hour) . 300 milliGRAM(s) Oral daily  chlorhexidine 2% Cloths 1 Application(s) Topical <User Schedule>  cholecalciferol 1000 Unit(s) Oral daily  clonazePAM  Tablet 0.5 milliGRAM(s) Oral at bedtime  dextrose 5%. 1000 milliLiter(s) (50 mL/Hr) IV Continuous <Continuous>  dextrose 5%. 1000 milliLiter(s) (100 mL/Hr) IV Continuous <Continuous>  dextrose 50% Injectable 25 Gram(s) IV Push once  dextrose 50% Injectable 12.5 Gram(s) IV Push once  dextrose 50% Injectable 25 Gram(s) IV Push once  folic acid 1 milliGRAM(s) Oral daily  glucagon  Injectable 1 milliGRAM(s) IntraMuscular once  heparin   Injectable 5000 Unit(s) SubCutaneous every 8 hours  influenza  Vaccine (HIGH DOSE) 0.5 milliLiter(s) IntraMuscular once  insulin lispro (ADMELOG) corrective regimen sliding scale   SubCutaneous Before meals and at bedtime  levothyroxine 25 MICROGram(s) Oral daily  midodrine 10 milliGRAM(s) Oral every 8 hours  multivitamin  Chewable 1 Tablet(s) Oral daily  norepinephrine Infusion 0.05 MICROgram(s)/kG/Min (5.53 mL/Hr) IV Continuous <Continuous>  oseltamivir 30 milliGRAM(s) Oral every 12 hours  polyethylene glycol 3350 17 Gram(s) Oral daily  risperiDONE   Tablet 0.5 milliGRAM(s) Oral at bedtime  senna 2 Tablet(s) Oral at bedtime  thiamine IVPB 500 milliGRAM(s) IV Intermittent every 8 hours    Care Discussed with Consultants/Other Providers [ x] YES  [ ] NO

## 2025-01-15 NOTE — PROGRESS NOTE ADULT - SUBJECTIVE AND OBJECTIVE BOX
Physical Medicine and Rehabilitation Progress Note :       Patient is a 69y old  Male who presents with a chief complaint of Septic shock 2/2 influenza (15 [place] 2025 09:14)      HPI:  69M PMH (per chart review) EtOH use disorder, HTN, ?hypothyroidism, ?schizoaffective disorder, CAD, ?CKD, diabetes who was BIBEMS to [facility] from shelter for SOB, cough, fevers, and malaise.    Patient reports feeling generally weak that started this morning. Called EMS because he was trying to get back to his residence but it was too cold outside and he felt weak. Also endorsed SOB, cough, and tremulousness when he presented to the ED. According to EMS SpO2 on arrival was 90%, he was placed on 2L NC with mild improvement in symptoms. He denies any known sick contacts or recent travel. Denies chills, CP, n/v, c/d, dysuria, blood in urine or stool.   Per [facility], patient is regularly prescribed several medications (including but not limited to lisinopril, buproprion, clonazepam, risperidone, metformin, atorvastatin, levothyroxine, etc) but most prescriptions remain unfilled; some medications last filled in May 2024. Pt reports only taking Wellbutrin daily, last took this AM.     No current tobacco use, reports ~12 pack year smoking history. Reports ~2 drinks per week, last drink 3 days ago. Denies other recreational drug use. Lives in residence housing. PCP is his psychiatrist Dr. Corrales whose office is also in his resident housing.    In [facility] ED:    Vitals: T 100.1F rectal,  -> 62, /77 -> 85/53, RR 21 SpO2 92% on 2L NC   Labs: WBC 9.55, Hgb 13.1, plt 262. BUN 25, Cr 2.24. , ALT 89. Lactate 4.6 -> 1.7. Troponin I 89.7 -> 106.3. pro-BNP 3520. RVP +influenza A.    Imaging: CTA negative for PE, b/l lower lobe mucus plugging.   Interventions: ceftriaxone/azithro, tamiflu, IV Mg 2g for repletion, 3L LR, Duonebs, Tylenol  (13 Jan 2025 01:26)                            10.7   7.26  )-----------( 183      ( 15 [place] 2025 05:30 )             31.5       01-15    134[L]  |  101  |  15  ----------------------------<  103[H]  3.9   |  20[L]  |  1.18    Ca    7.5[L]      15 [place] 2025 05:30  Phos  1.9     01-15  Mg     1.7     01-15    TPro  5.3[L]  /  Alb  3.0[L]  /  TBili  0.3  /  DBili  x   /  AST  41[H]  /  ALT  39  /  AlkPhos  68  01-15    Vital Signs Last 24 Hrs  T(C): 36.9 (15 [place] 2025 09:56), Max: 37.1 (15 [place] 2025 05:21)  T(F): 98.5 (15 [place] 2025 09:56), Max: 98.8 (15 [place] 2025 05:21)  HR: 77 (15 [place] 2025 11:00) (56 - 77)  BP: 92/53 (15 [place] 2025 11:00) (83/60 - 129/70)  BP(mean): 67 (15 [place] 2025 11:00) (66 - 103)  RR: 24 (15 [place] 2025 11:00) (16 - 31)  SpO2: 95% (15 [place] 2025 11:00) (94% - 98%)    Parameters below as of 15 [place] 2025 11:00  Patient On (Oxygen Delivery Method): room air        MEDICATIONS  (STANDING):  albuterol/ipratropium for Nebulization 3 milliLiter(s) Nebulizer every 4 hours  buPROPion XL (24-Hour) . 300 milliGRAM(s) Oral daily  chlorhexidine 2% Cloths 1 Application(s) Topical <User Schedule>  cholecalciferol 1000 Unit(s) Oral daily  clonazePAM  Tablet 0.5 milliGRAM(s) Oral at bedtime  dextrose 5%. 1000 milliLiter(s) (100 mL/Hr) IV Continuous <Continuous>  dextrose 5%. 1000 milliLiter(s) (50 mL/Hr) IV Continuous <Continuous>  dextrose 50% Injectable 25 Gram(s) IV Push once  dextrose 50% Injectable 12.5 Gram(s) IV Push once  dextrose 50% Injectable 25 Gram(s) IV Push once  enoxaparin Injectable 40 milliGRAM(s) SubCutaneous every 24 hours  fludroCORTISONE 0.1 milliGRAM(s) Oral every 24 hours  folic acid 1 milliGRAM(s) Oral daily  glucagon  Injectable 1 milliGRAM(s) IntraMuscular once  influenza  Vaccine (HIGH DOSE) 0.5 milliLiter(s) IntraMuscular once  insulin lispro (ADMELOG) corrective regimen sliding scale   SubCutaneous Before meals and at bedtime  levothyroxine 25 MICROGram(s) Oral daily  midodrine 10 milliGRAM(s) Oral every 8 hours  multivitamin  Chewable 1 Tablet(s) Oral daily  oseltamivir 30 milliGRAM(s) Oral every 12 hours  polyethylene glycol 3350 17 Gram(s) Oral daily  risperiDONE   Tablet 0.5 milliGRAM(s) Oral at bedtime  senna 2 Tablet(s) Oral at bedtime  thiamine IVPB 500 milliGRAM(s) IV Intermittent every 8 hours    MEDICATIONS  (PRN):  dextrose Oral Gel 15 Gram(s) Oral once PRN Blood Glucose LESS THAN 70 milliGRAM(s)/deciliter  meclizine 25 milliGRAM(s) Oral every 12 hours PRN Dizziness      T(C): 36.9 (01-15-25 @ 09:56), Max: 37.1 (01-15-25 @ 05:21)  HR: 77 (01-15-25 @ 11:00) (56 - 77)  BP: 92/53 (01-15-25 @ 11:00) (83/60 - 129/70)  RR: 24 (01-15-25 @ 11:00) (16 - 31)  SpO2: 95% (01-15-25 @ 11:00) (94% - 98%)    Physical Exam:   on DROPLET INFL A isolation , 69 y o manlying comfortably in semi Cheung's position , awake , alert , no acute complaints     Head: normocephalic , atraumatic    Eyes: PERRLA , EOMI , no nystagmus , sclera anicteric    ENT / FACE: neg nasal discharge , uvula midline , no oropharyngeal erythema / exudate    Neck: supple , negative JVD , negative carotid bruits , no thyromegaly    Chest: estefanía end exp wheezes    Cardiovascular: regular rate and rhythm , neg murmurs / rubs / gallops    Abdomen: soft , non distended , no tenderness to palpation in all 4 quadrants ,  normal bowel sounds     Extremities: WWP , neg cyanosis /clubbing / edema     Neurologic Exam:     Alert and oriented x 3      Motor Exam:        > 4/5 x 4 extremities     Sensation:         intact to light touch x 4 extremities                              DTR:           biceps/brachioradialis: equal                            patella/ankle: equal       1/14/2025 Functional Status Assessment :       Previous Level of Function:     · Bed Mobility/Transfers	independent; needs device  · Bathing	independent  · Upper Body Dressing	independent  · Lower Body Dressing	independent  · Grooming	independent  · Toileting	independent  · Eating	independent  · Home Management Skills	independent  · Additional Comments	pt lives in an SRO, no stairs to enter. pt was indep PTA with ADLs and functional mobility +cane. pt states he has a hx of dizziness which may be related to ear problems however pt unclear at this time, pt endorses 1 fall in the last 6 months.    · Ambulatory Devices Needed	yes  cane  · Adaptive Equipment Needed	no    Cognitive Status Examination:   · Level of Consciousness	alert  · Orientation	oriented to person, place, time and situation  · Follow Commands/Answers Questions	75% of the time; able to follow single-step instructions  · Personal Safety and Judgment	impaired  · Short Term Memory	intact  · Long Term Memory	intact    Range of Motion Exam:   · Range of Motion Examination, Upper Extremity	bilateral UE Active ROM was WFL  (within functional limits)  · Range of Motion Examination, Lower Extremity	bilateral LE Active ROM was WFL  (within functional limits)    Extremity Manual Muscle Testing:     · Right Lower Extremity	R hip 5/5, R knee flext 4+/5, R knee ext 4-/5, R ankle 4-/5  · Left Lower Extremity	5/5    Muscle Tone Assessment:   · Muscle Tone Assessment	bilateral upper extremities; bilateral lower extremities; normal    Bed Mobility: Sit to Supine:     · Level of [place]	not performed as pt left OOBTC    Bed Mobility: Supine to Sit:     · Level of [place]	not performed as pt received in bedside chair    Transfer: Sit to Stand:     · Level of [place]	moderate assist (50% patients effort)  · Physical Assist/Nonphysical Assist	verbal cues; nonverbal cues (demo/gestures); 1 person assist  · Assistive Device	rolling walker    Transfer: Stand to Sit:     · Level of [place]	moderate assist (50% patients effort)  · Physical Assist/Nonphysical Assist	verbal cues; nonverbal cues (demo/gestures); 1 person assist  · Assistive Device	rolling walker    Sit/Stand Transfer Safety Analysis:     · Impairments Contributing to Impaired Transfers	impaired balance; impaired coordination; decreased flexibility; impaired postural control; decreased strength    Balance Skills Assessment:     · Sitting Balance: Static	good balance  · Sitting Balance: Dynamic	good balance  · Sit-to-Stand Balance	poor plus  · Standing Balance: Static	fair minus  · Standing Balance: Dynamic	poor plus  · Identified Impairments Contributing to Balance Disturbance	impaired coordination; impaired motor control; impaired postural control; decreased strength    Sensory Examination:   · Balance Skills	Pt took ~6 forward steps with Chelsie +Rw +chair follow, pt c/o of fatigue and deferred further mobility      Light Touch Sensation:   · Left UE	within normal limits  · Right UE	within normal limits  · Left LE	within normal limits  · Right LE	within normal limits      Visual Assessment:   Visual Assessment:    Visual Assessment:   · Eye Muscle Balance	normal  · Visual Scanning	WFL    Fine Motor Coordination:     Fine Motor Coordination:   · Left Hand, Manipulation of Objects	normal performance  · Right Hand, Manipulation of Objects	normal performance    Lower Body Dressing Training:     · Level of [place]	maximum assist (25% patients effort)  · Physical Assist/Nonphysical Assist	verbal cues; nonverbal cues (demo/gestures); 1 person assist            PM&R Impression : as above    Current disposition plan recommendation :    subacute rehab placement

## 2025-01-15 NOTE — PROGRESS NOTE ADULT - ASSESSMENT
69M PMH (per chart review) EtOH use disorder, depression, former smoker (20 pack year quit 15 years ago), HTN, hypothyroid, CAD, diabetes who was BIBEMS to [facility] from shelter for SOB, cough, fevers, and malaise. Transferred to MICU for septic shock 2/2 influenza now off pressors and weaned to nasal cannula.     NEURO   #MDD  #h/o schizoaffective disorder  Psychiatrist Dr. Corrales. Per chart review, has previously been prescribed Wellbutrin, risperidone, clonazepam, gabapentin; reports only taking Wellbutrin.   Home med: Wellbutrin SR 200mg BID, iSTOP with clonazepam filled monthly last filled 12/18 and per discussion today with patient's PCP he has most recently been rx'd risperidone, clonazepam, Wellbutrin (per the  shop last filled on 12/19/24)  - c/w buproprion 300mg qd (therapeutic interchange for Wellbutrin 150mg BID)   - c/w risperidone 0.5mg HS  - c/w clonazepam 0.5mg HS    #H/o Alcohol use disorder   ~2 drinks per week, Last drink 1/10. Per chart review, was empirically treated for Wernicke’s encephalopathy in 2022 at [facility]. Discussed with mary's PCP about AUD and she states he is not forthcoming about his alcohol intake and quantity and frequency is not clear.   - IV thiamine 500mg qd x3d  - c/w folic acid, multivitamin qd  - utox unable to be run x 2 due to acidic urine  - CIWA protocol will consider phenobarb protocol if CIWA > 8     #Chronic Dizziness and unsteady gait  Patient c/o dizziness x 2 weeks. Per patient's PCP he has been chronically been c/o dizziness x months and has been having gait difficulties  - Head CT w Moderate severity chronic white matter microvascular type changes.  - Neuro consult   - c/w meclizine PRN got 1 dose yesterday w some relief  - c/w high dose thiamine  - PT/OT consult     CV   #Septic Shock  #Sinus bradycardia; Resolved  #h/o HTN and HLD  s/p 3L fluids in [facility] ED, remained hypotensive sBP 80s, MAP low 60. TSH wnl. On Lisinopril/HCTZ outpatient  - Wean levo as tolerated, goal MAP >65   - Midodrine increased to 10mg TID 1/13 PM   - Watch HR closesly w initiation of midodrine given h/o bradycardia  - Lipid panel (Total: 85 HDL: 31 LDL: 36) okay to keep of atorvastatin  - Maintain MAP>65   - s/p 500mL LR bolus this morning   - Hold BP meds    #Elevated troponin   #Elevated pro-BNP   Suspect 2/2 demand ischemia iso septic shock and SHIRA. EKG on presentation without signs of acute infarct.   Pro-BNP 3.5k on admission. Repeat EKG on admission shows sinus bradycardia without evidence of ischemia. QTc 414  - Troponin peaked @ 51   - TTE 1/13 normal systolic function    PULMONOLOGY  #Acute hypoxic respiratory failure  #Influenza PNA  CTA: negative for PE but showed bilateral lower lobe mucus plugging as well as bronchial wall thickening.   - Weaned to room air as tolerated, Maintain SpO2 goal >92%   - Duoneb q4h standing  - chest PT BID, incentive spirometry, aerobika   - Rest of plan as below    GI   #Transaminitis  , ALT 89. Ddx hypoperfusion iso septic shock vs alcohol hepatitis  - LFTs continue to downtrend  - Hepatitis panel negative   - RUQ not indicated no RUQ tenderness and LFTs almost normal     #Nutrition; severe, chronic protein-calorie malnutrition  - Regular diet w Ensure supplementation  - RD following and recs appreciated     RENAL/   #SHIRA likely prerenal 2/2 hypovolemia (Resolved)  Cr 1.3-1.4 in 2022 and per PCP 1.27 in August 2024. P/w Cr 2.24, suspect pre-renal iso septic shock and decreased po intake. UA spec grav elevated 1.046, CK 1026, FeNa 0.9%   - BMP daily   - Strict I/O    #Mild Hyponatremia(132) possibly 2/2 SIADH 2/2 influenza; appears euvolemic on exam  -Daily BMP  -If Na continues to downtrend will consider fluid restrictions    INFECTIOUS DISEASE  #Septic shock   Patient met sepsis criteria on admission (HR >90, RR >20). RVP +influenza A. UA neg for UTI. Lactate elevated to 4.6. Hypotension refractory to 3L fluids.    Lactate cleared s/p 3L fluids. s/p CTX 1g and azithromycin in ED. Procal (after receiving abx) 1.05.  - s/p CTX 1g/Azithro 500mg x 1 (1/12)  - c/w Tamiflu 30mg q24h x5d (1/12 - 1/16) - dose adjusted for CrCl increased to 30mg BID 1/13  - Will keep off abx for now if persistently febrile or if worsening shock will start CTX 2g/Azithro 500mg for CAP coverage  - f/u BCx x2 drawn on 1/12   - Urine legionella negative  - MRSA neg  - collect sputum cx if able to produce     ENDO   #Pre-diabetes  Prescribed metformin outpatient, has not been filled. A1c 6.4.  -SSI w premeal finger sticks    #R/o Hypothyroidism   Previously prescribed Synthroid 25mcg qd filled 12/12/25 per the  shop   - TSH 1.18 T4 4.52  - c/w home dose of levothyroxine 25mcg    HEME   #Anemia likely anemia of chronic disease or from sepsis   Hgb 10.3 s/p 3L fluid resuscitation. Possible h/o pernicious anemia per [facility] chart review.   - Folate 6.0, B12 596  - folic acid and MV as above  - Maintain active T&S  - Daily CBC    MSK  #Unsteady gait   - PT/OT consult    Maintainence  F: s/p 500mL LR today  E: Replete K>4 Mg>2 Phos>3  D: Regular w Ensure  GI PPx: Not indicated  VTE PPx: SQH  Code status: Full  Emergency Contact: Not file  Dispo: MICU; Possible SD to [facility] this afternoon  Lines: PIV x 1    [facility]  c: [phone]  p: [phone]   69M PMH (per chart review) EtOH use disorder, depression, former smoker (20 pack year quit 15 years ago), HTN, hypothyroid, CAD, diabetes who was BIBEMS to [facility] from shelter for SOB, cough, fevers, and malaise. Transferred to MICU for septic shock 2/2 influenza now off pressors and to room air.    NEURO   #MDD  #h/o schizoaffective disorder  Psychiatrist Dr. Corrales. Per chart review, has previously been prescribed Wellbutrin, risperidone, clonazepam, gabapentin; reports only taking Wellbutrin.   Home med: Wellbutrin SR 200mg BID, iSTOP with clonazepam filled monthly last filled 12/18 and per discussion today with patient's PCP he has most recently been rx'd risperidone, clonazepam, Wellbutrin (per the  shop last filled on 12/19/24)  - c/w buproprion 300mg qd (therapeutic interchange for Wellbutrin 150mg BID)   - c/w risperidone 0.5mg HS  - c/w clonazepam 0.5mg HS    #H/o Alcohol use disorder   ~2 drinks per week, Last drink 1/10. Per chart review, was empirically treated for Wernicke’s encephalopathy in 2022 at [facility]. Discussed with mary's PCP about AUD and she states he is not forthcoming about his alcohol intake and quantity and frequency is not clear.   - IV thiamine 500mg qd x3d (last day today 1/15)  - c/w multivitamin, transition to Thiamine 100mg PO QD starting 1/16.  - utox unable to be run x 2 due to acidic urine  - No signs of active withdrawal at this time but still w/i window, consider CIWA if signs of withdrawal    #Chronic Dizziness and unsteady gait  Pt c/o dizziness x 2 weeks and PCP reports c/o dizziness and gait instability for the last few months.  - Head CT w Moderate severity chronic white matter microvascular type changes.  - Neuro consulted, recs 1x EEG to assess for cerebral dysfunction/slowing and obtain MRI head w/o AIC protocol and MRI neck  - c/w Meclazine 25mg q12hrs PRN for dizziness  - c/w high dose thiamine as stated above  - PT/OT consulted    CV  #Septic shock (Resolved)  s/p 3L fluids in [facility] ED, remains hypotensive sBP 80s-90s, MAP low 60s. TSH wnl. On Lisinopril/HCTZ outpatient  - Off Levo as of 5am 1/14  - Given 1L LR bolus today (1/15 AM)  - Increased Midodrine to 10mg q8hrs, wean as tolerated for goal MAP >65.  - Monitor for bradycardia on Midodrine  - Lipid panel (Total: 85 HDL: 31 LDL: 36); home Atorvastatin 20mg QHS resumed.  - Maintain MAP>65  - Hold BP meds    #Elevated troponin   #Elevated pro-BNP   Suspect 2/2 demand ischemia iso septic shock and SHIRA. EKG on presentation without signs of acute infarct.   Pro-BNP 3.5k on admission. Repeat EKG on admission shows sinus bradycardia without evidence of ischemia. QTc 414  - Trop I max 103.9, Trop T max 51; no further trend indicated.  - TTE 1/13 normal systolic function    #CAD  Hx of CAD however on no APT per pt and Surescripts  -Holding off on initiation of ASA pending further collateral and neuro w/u for dizziness/gait instability  -Lipid panel (Total: 85 HDL: 31 LDL: 36); Started Rx'd Atorvastatin 20 QHS for plaque stabilization i/s/o hx of CAD    #HTN  Per Surescripts was Rx'd lisinopril/HCTZ 20/12.5mg qd but it was never filled.  -Remains on hold given borderline hypotension recently weaned off pressors and on Midodrine, resume as appropriate      PULM  #Acute hypoxic respiratory failure  CTA: negative for PE but showed bilateral lower lobe mucus plugging as well as bronchial wall thickening.   - Weaned to room air satting 95%+. Minimal secretions  - Duonebs reduced from q4 to q6hrs standing  - chest PT BID, incentive spirometry, aerobika   - Rest of plan as below    #Influenza PNA  -c/w Tamiflu 30mg PO q12hrs x5 days (last dose 1/17PM)  -Isolation precautions    GI   #Transaminitis (Resolved)  Peak , peak ALT 89. Likely i/s/o hypoperfusion 2/2 septic shock vs alcohol hepatitis (less likely)  - LFT's downtrending, now WNL  - Hepatitis panel neg  - RUQ not indicated at this time given no RUQ tenderness and resolution of transaminitis     #Nutrition; severe, chronic protein-calorie malnutrition  - Regular diet w/ Ensure high protein x2 can's/day. Tolerating diet, reports good appetite today (1/15)  - Fluid restriction removed today (1/15) as below  - Dietary following and recs appreciated     RENAL/   #SHIRA likely prerenal 2/2 hypovolemia (Resolved)  Cr 1.3-1.4 in 2022 and per PCP 1.27 in August 2024. P/w Cr 2.24, suspect pre-renal iso septic shock and decreased po intake. UA spec grav elevated 1.046, CK 1026, FeNa 0.9%  - Last BUN 15, Cr 1.18  - BMP daily  - Strict I/O  - Avoid nephrotoxic agents    #Mild Hyponatremia possibly 2/2 SIADH 2/2 influenza; appears euvolemic on exam  - Daily BMP; avoid Na changes >8/24hs  - 1.5L fluid restriction removed today (1/15)    INFECTIOUS DISEASE  #Septic shock   Pt met 2/4 SIRS criteria on admission (HR >90, RR >20). RVP +influenza A. UA neg. s/p CTX 1g and azithromycin in ED. Procal (after receiving abx) 1.05, labs showed lactic acidosis, SHIRA and shock liver i/s/o hypotension refractory to 3L fluid repletion subsequently requiring pressors, now weaned off w/ shock labs normalized. s/p CTX 1g and azithromycin in ED.  - BCx from 1/12 NGTD. MRSA neg, ULeg neg, UStrep neg, not enough sputum to culture  - s/p CTX 1g x1 and Azithro 500mg x1 (1/12), now monitoring off Abx. If febrile w/ c/f underlying bacterial PNA, would start CTX 2g IV q24hrs  - c/w Tamiflu 30mg q24h x5d (1/12 - 1/17PM)    ENDO   #Pre-diabetes  Prescribed metformin outpatient, has not been filled. A1c 6.4.  - c/w low ISS TID before meals    #R/o Hypothyroidism   Previously prescribed Synthroid 25mcg qd filled 12/12/25 per the  shop   - TSH 1.18 T4 4.52  - c/w home dose of levothyroxine 25mcg    HEME   #Anemia likely anemia of chronic disease or from sepsis   Hgb 10.3 s/p 3L fluid resuscitation. Possible h/o pernicious anemia per [facility] chart review.   - Folate 6.0, B12 596  - folic acid and MV as above  - Maintain active T&S  - Daily CBC    MSK  #Unsteady gait   - Neuro following, MRI head/neck pending  - c/w Florinef and Midodrine as above if possible component of orthostatic hypotension  - PT/OT consulted    Maintainence  F: s/p 1L LR today (1/15)  E: Replete K>4 Mg>2 Phos>3  D: Regular w/ Ensure protein 2x cans/day  GI PPx: Not indicated  VTE PPx: SQH transitioned to Lovenox 40mg SQ q24  Code status: Full  Emergency Contact: Not filed  PT/OT recs TARYN when ready for discharge  Dispo: Pending stepdown to telemetry  Lines: PIV x 1; dressing c/d/i        Case d/w PCCM Attending Dr. Oakley and PCCM Fellow Dr. Summers. 69M PMH (per chart review) EtOH use disorder, depression, former smoker (20 pack year quit 15 years ago), HTN, hypothyroid, CAD, diabetes who was BIBEMS to [facility] from shelter for SOB, cough, fevers, and malaise. Transferred to MICU for septic shock 2/2 influenza now off pressors and to room air.    NEURO   #MDD  #h/o schizoaffective disorder  Psychiatrist Dr. Corrales. Per chart review, has previously been prescribed Wellbutrin, risperidone, clonazepam, gabapentin; reports only taking Wellbutrin.   Home med: Wellbutrin SR 200mg BID, iSTOP with clonazepam filled monthly last filled 12/18 and per discussion today with patient's PCP he has most recently been rx'd risperidone, clonazepam, Wellbutrin (per the  shop last filled on 12/19/24)  - c/w buproprion 300mg qd (therapeutic interchange for Wellbutrin 150mg BID)   - c/w risperidone 0.5mg HS  - c/w clonazepam 0.5mg HS    #H/o Alcohol use disorder   ~2 drinks per week, Last drink 1/10. Per chart review, was empirically treated for Wernicke’s encephalopathy in 2022 at [place]. Discussed with mary's PCP about AUD and she states he is not forthcoming about his alcohol intake and quantity and frequency is not clear.   - IV thiamine 500mg qd x3d (last day today 1/15)  - c/w multivitamin, transition to Thiamine 100mg PO QD starting 1/16.  - utox unable to be run x 2 due to acidic urine  - No signs of active withdrawal at this time but still w/i window, consider CIWA if signs of withdrawal    #Chronic Dizziness and unsteady gait  Pt c/o dizziness x 2 weeks and PCP reports c/o dizziness and gait instability for the last few months.  - Head CT w Moderate severity chronic white matter microvascular type changes.  - Neuro consulted, recs 1x EEG to assess for cerebral dysfunction/slowing and obtain MRI head w/o AIC protocol and MRI neck  - c/w Meclazine 25mg q12hrs PRN for dizziness  - c/w high dose thiamine as stated above  - PT/OT consulted    CV  #Septic shock (Resolved)  s/p 3L fluids in [facility] ED, remains hypotensive sBP 80s-90s, MAP low 60s. TSH wnl. On Lisinopril/HCTZ outpatient  - Off Levo as of 5am 1/14  - Given 1L LR bolus today (1/15 AM)  - Increased Midodrine to 10mg q8hrs, wean as tolerated for goal MAP >65.  - Monitor for bradycardia on Midodrine  - Lipid panel (Total: 85 HDL: 31 LDL: 36); home Atorvastatin 20mg QHS resumed.  - Maintain MAP>65  - Hold BP meds    #Elevated troponin   #Elevated pro-BNP   Suspect 2/2 demand ischemia iso septic shock and SHIRA. EKG on presentation without signs of acute infarct.   Pro-BNP 3.5k on admission. Repeat EKG on admission shows sinus bradycardia without evidence of ischemia. QTc 414  - Trop I max 103.9, Trop T max 51; no further trend indicated.  - TTE 1/13 normal systolic function    #CAD  Hx of CAD however on no APT per pt and Surescripts  -Holding off on initiation of ASA pending further collateral and neuro w/u for dizziness/gait instability  -Lipid panel (Total: 85 HDL: 31 LDL: 36); Started Rx'd Atorvastatin 20 QHS for plaque stabilization i/s/o hx of CAD    #HTN  Per Surescripts was Rx'd lisinopril/HCTZ 20/12.5mg qd but it was never filled.  -Remains on hold given borderline hypotension recently weaned off pressors and on Midodrine, resume as appropriate      PULM  #Acute hypoxic respiratory failure  CTA: negative for PE but showed bilateral lower lobe mucus plugging as well as bronchial wall thickening.   - Weaned to room air satting 95%+. Minimal secretions  - Duonebs reduced from q4 to q6hrs standing  - chest PT BID, incentive spirometry, aerobika   - Rest of plan as below    #Influenza PNA  -c/w Tamiflu 30mg PO q12hrs x5 days (last dose 1/17PM)  -Isolation precautions    GI   #Transaminitis (Resolved)  Peak , peak ALT 89. Likely i/s/o hypoperfusion 2/2 septic shock vs alcohol hepatitis (less likely)  - LFT's downtrending, now WNL  - Hepatitis panel neg  - RUQ not indicated at this time given no RUQ tenderness and resolution of transaminitis     #Nutrition; severe, chronic protein-calorie malnutrition  - Regular diet w/ Ensure high protein x2 can's/day. Tolerating diet, reports good appetite today (1/15)  - Fluid restriction removed today (1/15) as below  - Dietary following and recs appreciated     RENAL/   #SHIRA likely prerenal 2/2 hypovolemia (Resolved)  Cr 1.3-1.4 in 2022 and per PCP 1.27 in August 2024. P/w Cr 2.24, suspect pre-renal iso septic shock and decreased po intake. UA spec grav elevated 1.046, CK 1026, FeNa 0.9%  - Last BUN 15, Cr 1.18  - BMP daily  - Strict I/O  - Avoid nephrotoxic agents    #Mild Hyponatremia possibly 2/2 SIADH 2/2 influenza; appears euvolemic on exam  - Daily BMP; avoid Na changes >8/24hs  - 1.5L fluid restriction removed today (1/15)    INFECTIOUS DISEASE  #Septic shock   Pt met 2/4 SIRS criteria on admission (HR >90, RR >20). RVP +influenza A. UA neg. s/p CTX 1g and azithromycin in ED. Procal (after receiving abx) 1.05, labs showed lactic acidosis, SHIRA and shock liver i/s/o hypotension refractory to 3L fluid repletion subsequently requiring pressors, now weaned off w/ shock labs normalized. s/p CTX 1g and azithromycin in ED.  - BCx from 1/12 NGTD. MRSA neg, ULeg neg, UStrep neg, not enough sputum to culture  - s/p CTX 1g x1 and Azithro 500mg x1 (1/12), now monitoring off Abx. If febrile w/ c/f underlying bacterial PNA, would start CTX 2g IV q24hrs  - c/w Tamiflu 30mg q24h x5d (1/12 - 1/17PM)    ENDO   #Pre-diabetes  Prescribed metformin outpatient, has not been filled. A1c 6.4.  - c/w low ISS TID before meals    #R/o Hypothyroidism   Previously prescribed Synthroid 25mcg qd filled 12/12/25 per the  shop   - TSH 1.18 T4 4.52  - c/w home dose of levothyroxine 25mcg    HEME   #Anemia likely anemia of chronic disease or from sepsis   Hgb 10.3 s/p 3L fluid resuscitation. Possible h/o pernicious anemia per [facility] chart review.   - Folate 6.0, B12 596  - folic acid and MV as above  - Maintain active T&S  - Daily CBC    MSK  #Unsteady gait   - Neuro following, MRI head/neck pending  - c/w Florinef and Midodrine as above if possible component of orthostatic hypotension  - PT/OT consulted    Maintainence  F: s/p 1L LR today (1/15)  E: Replete K>4 Mg>2 Phos>3  D: Regular w/ Ensure protein 2x cans/day  GI PPx: Not indicated  VTE PPx: SQH transitioned to Lovenox 40mg SQ q24  Code status: Full  Emergency Contact: Not filed  PT/OT recs TARYN when ready for discharge  Dispo: Pending stepdown to telemetry  Lines: PIV x 1; dressing c/d/i        Case d/w PCCM Attending Dr. Oakley and PCCM Fellow Dr. Summers.

## 2025-01-15 NOTE — PROGRESS NOTE ADULT - SUBJECTIVE AND OBJECTIVE BOX
**********STEPDOWN FROM MICU TO 7[facility]************    Patient is a 69y old  Male who presents with a chief complaint of fever, cough, SOB ([address] [place] 2025 13:52)      ED Course:   Mr. Robb presented to [facility] ED on 1/12 BIBEMS w c/o generalized weakness x the AM, shortness of breath, cough and tremors. SPO2 by EMS reportedly 90% on RA so he was placed on 2LPM NC. While in ED vital signs initially 100.1f (rectal), , BP 85/53, RR 21, SPO2 92% on 2 LPM with labs s/f BUN 25, Cr 2.234, , ALT 89, initial lactate of 4.6, troponin of 89, proBNP 3520 and RVP +influenza A. CTA of chest negative for PE but with b/l lower lobe mucus plugging. He was given CTX/Azithro, tamiflu, Duonebs, 3L LR, Mg 2g and tylenol. His lactate cleared but he remained hypotensive despite fluids so was started on low-dose norepinephrine and transferred to MICU at [facility].    HOSPITAL COURSE:  69M with PMHx of AUD, ?schizoaffective disorder, CAD, HTN, ?CKD, and DM who initially presented to [facility] ED BIBEMS from shelter for SOB, cough, fever and malaise. Labs significant for lactic acidosis, pre-renal SHIRA, and transaminitis c/w septic shock, pt was hypotensive to SBP 80s and MAP 60 s/p 3L LR after which pt was started on Levo, given CTX x1, Azithro x1, was started on Tamiflu and admitted to MICU. Since admission to MICU pt has been continue on Tamiflu and monitored off Abx w/ BCx NGTD, UA neg, UStrep neg, ULeg neg, MRSA neg. POCUS was also without gross consolidation or inflitrate. TTE 1/13 unremarkable. Pt was started on Midodrine 5mg q8 and subsequently weaned off pressors as of 5am 1/14, after which pt received 1L LR and Midodrine was uptitrated to 20mg q8hrs for SBP 80s, MAP 60 on 1/15. Received collateral from outpatient provider that patient baseline BP is hypertensive if anything, not hypotensive. Hospital course c/b pt reporting dizziness w/ changes in position for which Neuro was consulted, pt was started on fludrocortisone, high dose thiamine, underwent head CT showing chronic microvascular white matter changes, and pt is now pending EEG to assess for cerebral dysfunction/slowing and MRI head/neck. PT/OT consulted and rec TARYN.    INTERVAL HPI/OVERNIGHT EVENTS:   Patient seen and examined at bedside. Awake and alert-appearing, no acute complaints.     Vital Signs Last 24 Hrs  T(C): 36.4 (15 [place] 2025 21:20), Max: 37.1 (15 [place] 2025 05:21)  T(F): 97.6 (15 [place] 2025 21:20), Max: 98.8 (15 [place] 2025 05:21)  HR: 61 (15 [place] 2025 23:00) (60 - 78)  BP: 134/90 (15 [place] 2025 23:00) (76/51 - 134/90)  BP(mean): 101 (15 [place] 2025 23:00) (60 - 101)  ABP: --  ABP(mean): --  RR: 18 (15 [place] 2025 23:00) (18 - 31)  SpO2: 96% (15 [place] 2025 23:00) (95% - 99%)    O2 Parameters below as of 15 [place] 2025 23:00  Patient On (Oxygen Delivery Method): room air          I&O's Summary    14 Jan 2025 07:01  -  15 [place] 2025 07:00  --------------------------------------------------------  IN: 2145 mL / OUT: 1900 mL / NET: 245 mL    15 [place] 2025 07:01  -  15 [place] 2025 23:39  --------------------------------------------------------  IN: 1399 mL / OUT: 1350 mL / NET: 49 mL          LABS:                        10.7   7.26  )-----------( 183      ( 15 [place] 2025 05:30 )             31.5     01-15    134[L]  |  101  |  15  ----------------------------<  103[H]  3.9   |  20[L]  |  1.18    Ca    7.5[L]      15 [place] 2025 05:30  Phos  1.9     01-15  Mg     1.7     01-15    TPro  5.3[L]  /  Alb  3.0[L]  /  TBili  0.3  /  DBili  x   /  AST  41[H]  /  ALT  39  /  AlkPhos  68  01-15      Urinalysis Basic - ( 15 [place] 2025 05:30 )    Color: x / Appearance: x / SG: x / pH: x  Gluc: 103 mg/dL / Ketone: x  / Bili: x / Urobili: x   Blood: x / Protein: x / Nitrite: x   Leuk Esterase: x / RBC: x / WBC x   Sq Epi: x / Non Sq Epi: x / Bacteria: x      CAPILLARY BLOOD GLUCOSE:  POCT Blood Glucose.: 114 mg/dL (15 [place] 2025 21:25)  POCT Blood Glucose.: 120 mg/dL (15 [place] 2025 17:02)  POCT Blood Glucose.: 122 mg/dL (15 [place] 2025 11:10)  POCT Blood Glucose.: 90 mg/dL (15 [place] 2025 06:27)        RADIOLOGY & ADDITIONAL TESTS:    Consultant(s) Notes Reviewed:  [x ] YES  [ ] NO    MEDICATIONS  (STANDING):  albuterol/ipratropium for Nebulization 3 milliLiter(s) Nebulizer every 6 hours  atorvastatin 20 milliGRAM(s) Oral at bedtime  buPROPion XL (24-Hour) . 300 milliGRAM(s) Oral daily  chlorhexidine 2% Cloths 1 Application(s) Topical <User Schedule>  cholecalciferol 1000 Unit(s) Oral daily  clonazePAM  Tablet 0.5 milliGRAM(s) Oral at bedtime  dextrose 5%. 1000 milliLiter(s) (50 mL/Hr) IV Continuous <Continuous>  dextrose 5%. 1000 milliLiter(s) (100 mL/Hr) IV Continuous <Continuous>  dextrose 50% Injectable 25 Gram(s) IV Push once  dextrose 50% Injectable 12.5 Gram(s) IV Push once  dextrose 50% Injectable 25 Gram(s) IV Push once  enoxaparin Injectable 40 milliGRAM(s) SubCutaneous every 24 hours  fludroCORTISONE 0.1 milliGRAM(s) Oral every 24 hours  folic acid 1 milliGRAM(s) Oral daily  glucagon  Injectable 1 milliGRAM(s) IntraMuscular once  influenza  Vaccine (HIGH DOSE) 0.5 milliLiter(s) IntraMuscular once  insulin lispro (ADMELOG) corrective regimen sliding scale   SubCutaneous Before meals and at bedtime  levothyroxine 25 MICROGram(s) Oral daily  midodrine 20 milliGRAM(s) Oral every 8 hours  multivitamin  Chewable 1 Tablet(s) Oral daily  oseltamivir 30 milliGRAM(s) Oral every 12 hours  polyethylene glycol 3350 17 Gram(s) Oral daily  risperiDONE   Tablet 0.5 milliGRAM(s) Oral at bedtime  senna 2 Tablet(s) Oral at bedtime    MEDICATIONS  (PRN):  dextrose Oral Gel 15 Gram(s) Oral once PRN Blood Glucose LESS THAN 70 milliGRAM(s)/deciliter  meclizine 25 milliGRAM(s) Oral every 12 hours PRN Dizziness        Care Discussed with Consultants/Other Providers [ x] YES  [ ] NO **********STEPDOWN FROM MICU TO 7[facility]************    Patient is a 69y old  Male who presents with a chief complaint of fever, cough, SOB (15 [place] 2025 13:52)      ED Course:   Mr. Robb presented to [facility] ED on 1/12 BIBEMS w c/o generalized weakness x the AM, shortness of breath, cough and tremors. SPO2 by EMS reportedly 90% on RA so he was placed on 2LPM NC. While in ED vital signs initially 100.1f (rectal), , BP 85/53, RR 21, SPO2 92% on 2 LPM with labs s/f BUN 25, Cr 2.234, , ALT 89, initial lactate of 4.6, troponin of 89, proBNP 3520 and RVP +influenza A. CTA of chest negative for PE but with b/l lower lobe mucus plugging. He was given CTX/Azithro, tamiflu, Duonebs, 3L LR, Mg 2g and tylenol. His lactate cleared but he remained hypotensive despite fluids so was started on low-dose norepinephrine and transferred to MICU at [facility].    HOSPITAL COURSE:  69M with PMHx of AUD, ?schizoaffective disorder, CAD, HTN, ?CKD, and DM who initially presented to [facility] ED BIBEMS from shelter for SOB, cough, fever and malaise. Labs significant for lactic acidosis, pre-renal SHIRA, and transaminitis c/w septic shock, pt was hypotensive to SBP 80s and MAP 60 s/p 3L LR after which pt was started on Levo, given CTX x1, Azithro x1, was started on Tamiflu and admitted to MICU. Since admission to MICU pt has been continue on Tamiflu and monitored off Abx w/ BCx NGTD, UA neg, UStrep neg, ULeg neg, MRSA neg. POCUS was also without gross consolidation or inflitrate. TTE 1/13 unremarkable. Pt was started on Midodrine 5mg q8 and subsequently weaned off pressors as of 5am 1/14, after which pt received 1L LR and Midodrine was uptitrated to 20mg q8hrs for SBP 80s, MAP 60 on 1/15. Received collateral from outpatient provider that patient baseline BP is hypertensive if anything, not hypotensive. Hospital course c/b pt reporting dizziness w/ changes in position for which Neuro was consulted, pt was started on fludrocortisone, high dose thiamine, underwent head CT showing chronic microvascular white matter changes, and pt is now pending EEG to assess for cerebral dysfunction/slowing and MRI head/neck. PT/OT consulted and rec TARYN.    INTERVAL HPI/OVERNIGHT EVENTS:   Patient seen and examined at bedside. Awake and alert-appearing, no acute complaints. States that his voice is not normally this hoarse. Denies SOB, f/c/n/v. Last BM was yesterday, denies bloody/black stools. Lives alone, ambulates independently with walker sometimes.     VITAL SIGNS:  T(C): 36.4 (01-15-25 @ 21:20), Max: 37.1 (01-15-25 @ 05:21)  T(F): 97.6 (01-15-25 @ 21:20), Max: 98.8 (01-15-25 @ 05:21)  HR: 70 (01-15-25 @ 23:39) (60 - 78)  BP: 116/78 (01-15-25 @ 23:39) (76/51 - 134/90)  BP(mean): 92 (01-15-25 @ 23:39) (60 - 101)  RR: 18 (01-15-25 @ 23:39) (18 - 31)  SpO2: 97% (01-15-25 @ 23:39) (95% - 99%)  Wt(kg): --    PHYSICAL EXAM:  Constitutional: AOx3, hoarse voice, WDWN resting comfortably in bed; NAD  Eyes: anicteric sclera  Respiratory: Expiratory wheezing in upper lobes b/l, coarse breath sounds and rhonchi in lower lobes b/l   Cardiac: +S1/S2; RRR; no M/R/G  Gastrointestinal: abdomen soft, NT/ND; no rebound or guarding  Back: spine midline, no bony tenderness or step-offs; no CVAT B/L  Extremities: WWP, no peripheral edema  Musculoskeletal: NROM x4    I&O's Summary    14 Jan 2025 07:01  -  15 [place] 2025 07:00  --------------------------------------------------------  IN: 2145 mL / OUT: 1900 mL / NET: 245 mL    15 [place] 2025 07:01  -  15 [place] 2025 23:39  --------------------------------------------------------  IN: 1399 mL / OUT: 1350 mL / NET: 49 mL          LABS:                        10.7   7.26  )-----------( 183      ( 15 [place] 2025 05:30 )             31.5     01-15    134[L]  |  101  |  15  ----------------------------<  103[H]  3.9   |  20[L]  |  1.18    Ca    7.5[L]      15 [place] 2025 05:30  Phos  1.9     01-15  Mg     1.7     01-15    TPro  5.3[L]  /  Alb  3.0[L]  /  TBili  0.3  /  DBili  x   /  AST  41[H]  /  ALT  39  /  AlkPhos  68  01-15      Urinalysis Basic - ( 15 [place] 2025 05:30 )    Color: x / Appearance: x / SG: x / pH: x  Gluc: 103 mg/dL / Ketone: x  / Bili: x / Urobili: x   Blood: x / Protein: x / Nitrite: x   Leuk Esterase: x / RBC: x / WBC x   Sq Epi: x / Non Sq Epi: x / Bacteria: x      CAPILLARY BLOOD GLUCOSE:  POCT Blood Glucose.: 114 mg/dL (15 [place] 2025 21:25)  POCT Blood Glucose.: 120 mg/dL (15 [place] 2025 17:02)  POCT Blood Glucose.: 122 mg/dL (15 [place] 2025 11:10)  POCT Blood Glucose.: 90 mg/dL (15 [place] 2025 06:27)        RADIOLOGY & ADDITIONAL TESTS:    Consultant(s) Notes Reviewed:  [x ] YES  [ ] NO    MEDICATIONS  (STANDING):  albuterol/ipratropium for Nebulization 3 milliLiter(s) Nebulizer every 6 hours  atorvastatin 20 milliGRAM(s) Oral at bedtime  buPROPion XL (24-Hour) . 300 milliGRAM(s) Oral daily  chlorhexidine 2% Cloths 1 Application(s) Topical <User Schedule>  cholecalciferol 1000 Unit(s) Oral daily  clonazePAM  Tablet 0.5 milliGRAM(s) Oral at bedtime  dextrose 5%. 1000 milliLiter(s) (50 mL/Hr) IV Continuous <Continuous>  dextrose 5%. 1000 milliLiter(s) (100 mL/Hr) IV Continuous <Continuous>  dextrose 50% Injectable 25 Gram(s) IV Push once  dextrose 50% Injectable 12.5 Gram(s) IV Push once  dextrose 50% Injectable 25 Gram(s) IV Push once  enoxaparin Injectable 40 milliGRAM(s) SubCutaneous every 24 hours  fludroCORTISONE 0.1 milliGRAM(s) Oral every 24 hours  folic acid 1 milliGRAM(s) Oral daily  glucagon  Injectable 1 milliGRAM(s) IntraMuscular once  influenza  Vaccine (HIGH DOSE) 0.5 milliLiter(s) IntraMuscular once  insulin lispro (ADMELOG) corrective regimen sliding scale   SubCutaneous Before meals and at bedtime  levothyroxine 25 MICROGram(s) Oral daily  midodrine 20 milliGRAM(s) Oral every 8 hours  multivitamin  Chewable 1 Tablet(s) Oral daily  oseltamivir 30 milliGRAM(s) Oral every 12 hours  polyethylene glycol 3350 17 Gram(s) Oral daily  risperiDONE   Tablet 0.5 milliGRAM(s) Oral at bedtime  senna 2 Tablet(s) Oral at bedtime    MEDICATIONS  (PRN):  dextrose Oral Gel 15 Gram(s) Oral once PRN Blood Glucose LESS THAN 70 milliGRAM(s)/deciliter  meclizine 25 milliGRAM(s) Oral every 12 hours PRN Dizziness        Care Discussed with Consultants/Other Providers [ x] YES  [ ] NO

## 2025-01-15 NOTE — PROGRESS NOTE ADULT - ATTENDING COMMENTS
69 M p/w influenza A, hypotensive and junctional rhythm   on oseltamivir, monitoring off abx   - off pressors, had been started on midodrine  -SHIRA improving    Decision making high complexity Septic shock 2/2 influenza with ongoing vasoplegia given relative hypotension, but remains WWP, mentating, stable renal function with adequate UOP, possible component of adrenal insufficiency. Now on florinef and midodrine. BCx negative, remains afebrile, responded to IVF bolus. PT/OT. Transfer to telemetry

## 2025-01-15 NOTE — PROGRESS NOTE ADULT - ATTENDING COMMENTS
triggered intermittent vestibular syndrome, impression outlined in detail in my first note. today we did more thorough cognitive screening and he falls in the range highly suspicious for dementia    Obtain MRI brain w/ and w/o IAC protocol  Obtain MRA head and neck  Will need neuropsych eval when stable (can be done outpatient)

## 2025-01-15 NOTE — PROGRESS NOTE ADULT - ASSESSMENT
69M PMH (per chart review) EtOH use disorder, HTN, ?hypothyroidism, ?schizoaffective disorder, CAD, ?CKD, diabetes who was BIBEMS to [facility] from shelter for SOB, cough, fevers, and malaise admitted to MICU for septic shock 2/2 influenza.  Neurology consulted for intermittent, positional dizziness (worst with standing) w/ hearing loss/tinnitus. Orthostatics negative.     Recommend r/o structural cause of symptoms (symptoms triggered with standing in the absence of orthostatic changes as well as history are atypical for peripheral vestibular etiology - menieres, vestibular neuronitis).     Incidentally, there is also a fronto-temporal pattern of atrophy superimposed on generalized atrophy raising c/f underlying dementia. MOCA score ___.       RECOMMENDATIONS:  Obtain MRI brain w/ and w/o IAC protocol  Obtain MRA head and neck    Discussed with Dr. Hargrove   69M PMH (per chart review) EtOH use disorder, HTN, ?hypothyroidism, ?schizoaffective disorder, CAD, ?CKD, diabetes who was BIBEMS to [facility] from shelter for SOB, cough, fevers, and malaise admitted to MICU for septic shock 2/2 influenza.  Neurology consulted for intermittent, positional dizziness (worst with standing) w/ hearing loss/tinnitus. Orthostatics negative.     Recommend r/o structural cause of symptoms (symptoms triggered with standing in the absence of orthostatic changes as well as history are atypical for peripheral vestibular etiology - menieres, vestibular neuronitis).     Incidentally, there is also a fronto-temporal pattern of atrophy superimposed on generalized atrophy raising c/f underlying dementia. MOCA score 19/30 that appeared more consistent with Alzheimer's dementia although patient may benefit from a neuropsych eval      RECOMMENDATIONS:  Obtain MRI brain w/ and w/o IAC protocol  Obtain MRA head and neck  Will need neuropsych eval when stable (can be done outpatient)    Discussed case with attending Dr. Hargrove

## 2025-01-16 LAB
ALBUMIN SERPL ELPH-MCNC: 2.9 G/DL — LOW (ref 3.3–5)
ALP SERPL-CCNC: 62 U/L — SIGNIFICANT CHANGE UP (ref 40–120)
ALT FLD-CCNC: 38 U/L — SIGNIFICANT CHANGE UP (ref 10–45)
ANION GAP SERPL CALC-SCNC: 7 MMOL/L — SIGNIFICANT CHANGE UP (ref 5–17)
AST SERPL-CCNC: 41 U/L — HIGH (ref 10–40)
BASOPHILS # BLD AUTO: 0.02 K/UL — SIGNIFICANT CHANGE UP (ref 0–0.2)
BASOPHILS NFR BLD AUTO: 0.3 % — SIGNIFICANT CHANGE UP (ref 0–2)
BILIRUB SERPL-MCNC: 0.3 MG/DL — SIGNIFICANT CHANGE UP (ref 0.2–1.2)
BUN SERPL-MCNC: 19 MG/DL — SIGNIFICANT CHANGE UP (ref 7–23)
CALCIUM SERPL-MCNC: 8 MG/DL — LOW (ref 8.4–10.5)
CHLORIDE SERPL-SCNC: 106 MMOL/L — SIGNIFICANT CHANGE UP (ref 96–108)
CO2 SERPL-SCNC: 25 MMOL/L — SIGNIFICANT CHANGE UP (ref 22–31)
CREAT SERPL-MCNC: 1.08 MG/DL — SIGNIFICANT CHANGE UP (ref 0.5–1.3)
EGFR: 74 ML/MIN/1.73M2 — SIGNIFICANT CHANGE UP
EOSINOPHIL # BLD AUTO: 0.29 K/UL — SIGNIFICANT CHANGE UP (ref 0–0.5)
EOSINOPHIL NFR BLD AUTO: 4.6 % — SIGNIFICANT CHANGE UP (ref 0–6)
GLUCOSE SERPL-MCNC: 137 MG/DL — HIGH (ref 70–99)
HCT VFR BLD CALC: 29.9 % — LOW (ref 39–50)
HGB BLD-MCNC: 10 G/DL — LOW (ref 13–17)
IMM GRANULOCYTES NFR BLD AUTO: 0.5 % — SIGNIFICANT CHANGE UP (ref 0–0.9)
LYMPHOCYTES # BLD AUTO: 2.57 K/UL — SIGNIFICANT CHANGE UP (ref 1–3.3)
LYMPHOCYTES # BLD AUTO: 40.9 % — SIGNIFICANT CHANGE UP (ref 13–44)
MAGNESIUM SERPL-MCNC: 1.8 MG/DL — SIGNIFICANT CHANGE UP (ref 1.6–2.6)
MCHC RBC-ENTMCNC: 32.3 PG — SIGNIFICANT CHANGE UP (ref 27–34)
MCHC RBC-ENTMCNC: 33.4 G/DL — SIGNIFICANT CHANGE UP (ref 32–36)
MCV RBC AUTO: 96.5 FL — SIGNIFICANT CHANGE UP (ref 80–100)
MONOCYTES # BLD AUTO: 0.48 K/UL — SIGNIFICANT CHANGE UP (ref 0–0.9)
MONOCYTES NFR BLD AUTO: 7.6 % — SIGNIFICANT CHANGE UP (ref 2–14)
NEUTROPHILS # BLD AUTO: 2.89 K/UL — SIGNIFICANT CHANGE UP (ref 1.8–7.4)
NEUTROPHILS NFR BLD AUTO: 46.1 % — SIGNIFICANT CHANGE UP (ref 43–77)
NRBC # BLD: 0 /100 WBCS — SIGNIFICANT CHANGE UP (ref 0–0)
NRBC BLD-RTO: 0 /100 WBCS — SIGNIFICANT CHANGE UP (ref 0–0)
PHOSPHATE SERPL-MCNC: 1.3 MG/DL — LOW (ref 2.5–4.5)
PLATELET # BLD AUTO: 196 K/UL — SIGNIFICANT CHANGE UP (ref 150–400)
POTASSIUM SERPL-MCNC: 3.8 MMOL/L — SIGNIFICANT CHANGE UP (ref 3.5–5.3)
POTASSIUM SERPL-SCNC: 3.8 MMOL/L — SIGNIFICANT CHANGE UP (ref 3.5–5.3)
PROT SERPL-MCNC: 5.3 G/DL — LOW (ref 6–8.3)
RBC # BLD: 3.1 M/UL — LOW (ref 4.2–5.8)
RBC # FLD: 14.1 % — SIGNIFICANT CHANGE UP (ref 10.3–14.5)
SODIUM SERPL-SCNC: 138 MMOL/L — SIGNIFICANT CHANGE UP (ref 135–145)
WBC # BLD: 6.28 K/UL — SIGNIFICANT CHANGE UP (ref 3.8–10.5)
WBC # FLD AUTO: 6.28 K/UL — SIGNIFICANT CHANGE UP (ref 3.8–10.5)

## 2025-01-16 PROCEDURE — 99233 SBSQ HOSP IP/OBS HIGH 50: CPT | Mod: GC

## 2025-01-16 RX ORDER — POTASSIUM PHOSPHATE, MONOBASIC POTASSIUM PHOSPHATE, DIBASIC 236; 224 MG/ML; MG/ML
30 INJECTION, SOLUTION INTRAVENOUS ONCE
Refills: 0 | Status: COMPLETED | OUTPATIENT
Start: 2025-01-16 | End: 2025-01-16

## 2025-01-16 RX ORDER — MIDODRINE HYDROCHLORIDE 5 MG/1
15 TABLET ORAL EVERY 8 HOURS
Refills: 0 | Status: DISCONTINUED | OUTPATIENT
Start: 2025-01-17 | End: 2025-01-17

## 2025-01-16 RX ORDER — MIDODRINE HYDROCHLORIDE 5 MG/1
20 TABLET ORAL ONCE
Refills: 0 | Status: COMPLETED | OUTPATIENT
Start: 2025-01-16 | End: 2025-01-16

## 2025-01-16 RX ADMIN — LEVOTHYROXINE SODIUM 25 MICROGRAM(S): 25 TABLET ORAL at 05:55

## 2025-01-16 RX ADMIN — Medication 1 MILLIGRAM(S): at 12:00

## 2025-01-16 RX ADMIN — MIDODRINE HYDROCHLORIDE 20 MILLIGRAM(S): 5 TABLET ORAL at 21:53

## 2025-01-16 RX ADMIN — Medication 0.5 MILLIGRAM(S): at 21:52

## 2025-01-16 RX ADMIN — OSELTAMIVIR PHOSPHATE 30 MILLIGRAM(S): 75 CAPSULE ORAL at 18:34

## 2025-01-16 RX ADMIN — POTASSIUM PHOSPHATE, MONOBASIC POTASSIUM PHOSPHATE, DIBASIC 83.33 MILLIMOLE(S): 236; 224 INJECTION, SOLUTION INTRAVENOUS at 14:39

## 2025-01-16 RX ADMIN — POLYETHYLENE GLYCOL 3350 17 GRAM(S): 17 POWDER, FOR SOLUTION ORAL at 12:00

## 2025-01-16 RX ADMIN — MIDODRINE HYDROCHLORIDE 20 MILLIGRAM(S): 5 TABLET ORAL at 13:28

## 2025-01-16 RX ADMIN — OSELTAMIVIR PHOSPHATE 30 MILLIGRAM(S): 75 CAPSULE ORAL at 07:09

## 2025-01-16 RX ADMIN — ATORVASTATIN CALCIUM 20 MILLIGRAM(S): 80 TABLET, FILM COATED ORAL at 21:52

## 2025-01-16 RX ADMIN — IPRATROPIUM BROMIDE AND ALBUTEROL SULFATE 3 MILLILITER(S): .5; 2.5 SOLUTION RESPIRATORY (INHALATION) at 04:23

## 2025-01-16 RX ADMIN — Medication 2 TABLET(S): at 21:53

## 2025-01-16 RX ADMIN — ANTISEPTIC SURGICAL SCRUB 1 APPLICATION(S): 0.04 SOLUTION TOPICAL at 07:18

## 2025-01-16 RX ADMIN — ENOXAPARIN SODIUM 40 MILLIGRAM(S): 100 INJECTION SUBCUTANEOUS at 13:28

## 2025-01-16 RX ADMIN — MIDODRINE HYDROCHLORIDE 20 MILLIGRAM(S): 5 TABLET ORAL at 07:09

## 2025-01-16 RX ADMIN — Medication 0.5 MILLIGRAM(S): at 21:53

## 2025-01-16 RX ADMIN — Medication 1 TABLET(S): at 12:00

## 2025-01-16 RX ADMIN — IPRATROPIUM BROMIDE AND ALBUTEROL SULFATE 3 MILLILITER(S): .5; 2.5 SOLUTION RESPIRATORY (INHALATION) at 10:15

## 2025-01-16 RX ADMIN — BUPROPION HYDROCHLORIDE 300 MILLIGRAM(S): 150 TABLET, EXTENDED RELEASE ORAL at 12:00

## 2025-01-16 RX ADMIN — Medication 100 MILLIGRAM(S): at 12:01

## 2025-01-16 RX ADMIN — Medication 1000 UNIT(S): at 12:00

## 2025-01-16 RX ADMIN — IPRATROPIUM BROMIDE AND ALBUTEROL SULFATE 3 MILLILITER(S): .5; 2.5 SOLUTION RESPIRATORY (INHALATION) at 21:52

## 2025-01-16 RX ADMIN — IPRATROPIUM BROMIDE AND ALBUTEROL SULFATE 3 MILLILITER(S): .5; 2.5 SOLUTION RESPIRATORY (INHALATION) at 15:39

## 2025-01-16 NOTE — PROGRESS NOTE ADULT - SUBJECTIVE AND OBJECTIVE BOX
**INCOMPLETE NOTE    INTERVAL/OVERNIGHT EVENTS: None    SUBJECTIVE:  Patient seen and examined at bedside, comfortable, NAD. Denied fever, chest pain, dyspnea, abdominal pain.     Vital Signs Last 12 Hrs  T(F): 97 (01-16-25 @ 06:16), Max: 97.6 (01-15-25 @ 21:20)  HR: 58 (01-16-25 @ 04:12) (58 - 70)  BP: 92/54 (01-16-25 @ 04:12) (92/54 - 134/90)  BP(mean): 68 (01-16-25 @ 04:12) (68 - 101)  RR: 18 (01-16-25 @ 04:12) (18 - 31)  SpO2: 91% (01-16-25 @ 04:12) (91% - 99%)  I&O's Summary    15 [place] 2025 07:01  -  16 Jan 2025 07:00  --------------------------------------------------------  IN: 1399 mL / OUT: 1775 mL / NET: -376 mL    PHYSICAL EXAM:  General: NAD  HEENT: PERRL, EOM intact, sclera anicteric, MMM  Cardiovascular: RRR; no MRG;   Respiratory: CTAB; no WRR  GI/: soft; NTND; BS x4  Extremities: WWP; 2+ peripheral pulses bilaterally; no LE edema  Skin: normal color & turgor; no rash  Neurologic: aox3; no focal deficits    LABS:                        10.7   7.26  )-----------( 183      ( 15 [place] 2025 05:30 )             31.5     01-15    134[L]  |  101  |  15  ----------------------------<  103[H]  3.9   |  20[L]  |  1.18    Ca    7.5[L]      15 [place] 2025 05:30  Phos  1.9     01-15  Mg     1.7     01-15    TPro  5.3[L]  /  Alb  3.0[L]  /  TBili  0.3  /  DBili  x   /  AST  41[H]  /  ALT  39  /  AlkPhos  68  01-15      Urinalysis Basic - ( 15 [place] 2025 05:30 )    Color: x / Appearance: x / SG: x / pH: x  Gluc: 103 mg/dL / Ketone: x  / Bili: x / Urobili: x   Blood: x / Protein: x / Nitrite: x   Leuk Esterase: x / RBC: x / WBC x   Sq Epi: x / Non Sq Epi: x / Bacteria: x    RADIOLOGY & ADDITIONAL TESTS:    MEDICATIONS  (STANDING):  albuterol/ipratropium for Nebulization 3 milliLiter(s) Nebulizer every 6 hours  atorvastatin 20 milliGRAM(s) Oral at bedtime  buPROPion XL (24-Hour) . 300 milliGRAM(s) Oral daily  chlorhexidine 2% Cloths 1 Application(s) Topical <User Schedule>  cholecalciferol 1000 Unit(s) Oral daily  clonazePAM  Tablet 0.5 milliGRAM(s) Oral at bedtime  dextrose 5%. 1000 milliLiter(s) (50 mL/Hr) IV Continuous <Continuous>  dextrose 5%. 1000 milliLiter(s) (100 mL/Hr) IV Continuous <Continuous>  dextrose 50% Injectable 25 Gram(s) IV Push once  dextrose 50% Injectable 12.5 Gram(s) IV Push once  dextrose 50% Injectable 25 Gram(s) IV Push once  enoxaparin Injectable 40 milliGRAM(s) SubCutaneous every 24 hours  fludroCORTISONE 0.1 milliGRAM(s) Oral every 24 hours  folic acid 1 milliGRAM(s) Oral daily  glucagon  Injectable 1 milliGRAM(s) IntraMuscular once  influenza  Vaccine (HIGH DOSE) 0.5 milliLiter(s) IntraMuscular once  insulin lispro (ADMELOG) corrective regimen sliding scale   SubCutaneous Before meals and at bedtime  levothyroxine 25 MICROGram(s) Oral daily  midodrine 20 milliGRAM(s) Oral every 8 hours  multivitamin  Chewable 1 Tablet(s) Oral daily  oseltamivir 30 milliGRAM(s) Oral every 12 hours  polyethylene glycol 3350 17 Gram(s) Oral daily  risperiDONE   Tablet 0.5 milliGRAM(s) Oral at bedtime  senna 2 Tablet(s) Oral at bedtime  thiamine 100 milliGRAM(s) Oral daily    MEDICATIONS  (PRN):  dextrose Oral Gel 15 Gram(s) Oral once PRN Blood Glucose LESS THAN 70 milliGRAM(s)/deciliter  meclizine 25 milliGRAM(s) Oral every 12 hours PRN Dizziness   INTERVAL/OVERNIGHT EVENTS: None    SUBJECTIVE:  Patient seen and examined at bedside, comfortable, but states that he has been coughing and his throat feels sore. He also admits to coughing up some blood and feeling a little SOB.    Vital Signs Last 12 Hrs  T(F): 97 (01-16-25 @ 06:16), Max: 97.6 (01-15-25 @ 21:20)  HR: 58 (01-16-25 @ 04:12) (58 - 70)  BP: 92/54 (01-16-25 @ 04:12) (92/54 - 134/90)  BP(mean): 68 (01-16-25 @ 04:12) (68 - 101)  RR: 18 (01-16-25 @ 04:12) (18 - 31)  SpO2: 91% (01-16-25 @ 04:12) (91% - 99%)  I&O's Summary    15 [place] 2025 07:01  -  16 Jan 2025 07:00  --------------------------------------------------------  IN: 1399 mL / OUT: 1775 mL / NET: -376 mL    PHYSICAL EXAM:  General: NAD  HEENT: PERRL, EOM intact, sclera anicteric, MMM  Cardiovascular: RRR; no MRG  Respiratory: harsh breath sounds noted  GI/: soft; NTND  Extremities: WWP; 2+ peripheral pulses bilaterally; no LE edema  Skin: normal color & turgor; no rash noted  Neurologic: aox3; no focal deficits; able to follow commands    LABS:                        10.7   7.26  )-----------( 183      ( 15 [place] 2025 05:30 )             31.5     01-15    134[L]  |  101  |  15  ----------------------------<  103[H]  3.9   |  20[L]  |  1.18    Ca    7.5[L]      15 [place] 2025 05:30  Phos  1.9     01-15  Mg     1.7     01-15    TPro  5.3[L]  /  Alb  3.0[L]  /  TBili  0.3  /  DBili  x   /  AST  41[H]  /  ALT  39  /  AlkPhos  68  01-15      Urinalysis Basic - ( 15 [place] 2025 05:30 )    Color: x / Appearance: x / SG: x / pH: x  Gluc: 103 mg/dL / Ketone: x  / Bili: x / Urobili: x   Blood: x / Protein: x / Nitrite: x   Leuk Esterase: x / RBC: x / WBC x   Sq Epi: x / Non Sq Epi: x / Bacteria: x    RADIOLOGY & ADDITIONAL TESTS:    MEDICATIONS  (STANDING):  albuterol/ipratropium for Nebulization 3 milliLiter(s) Nebulizer every 6 hours  atorvastatin 20 milliGRAM(s) Oral at bedtime  buPROPion XL (24-Hour) . 300 milliGRAM(s) Oral daily  chlorhexidine 2% Cloths 1 Application(s) Topical <User Schedule>  cholecalciferol 1000 Unit(s) Oral daily  clonazePAM  Tablet 0.5 milliGRAM(s) Oral at bedtime  dextrose 5%. 1000 milliLiter(s) (50 mL/Hr) IV Continuous <Continuous>  dextrose 5%. 1000 milliLiter(s) (100 mL/Hr) IV Continuous <Continuous>  dextrose 50% Injectable 25 Gram(s) IV Push once  dextrose 50% Injectable 12.5 Gram(s) IV Push once  dextrose 50% Injectable 25 Gram(s) IV Push once  enoxaparin Injectable 40 milliGRAM(s) SubCutaneous every 24 hours  fludroCORTISONE 0.1 milliGRAM(s) Oral every 24 hours  folic acid 1 milliGRAM(s) Oral daily  glucagon  Injectable 1 milliGRAM(s) IntraMuscular once  influenza  Vaccine (HIGH DOSE) 0.5 milliLiter(s) IntraMuscular once  insulin lispro (ADMELOG) corrective regimen sliding scale   SubCutaneous Before meals and at bedtime  levothyroxine 25 MICROGram(s) Oral daily  midodrine 20 milliGRAM(s) Oral every 8 hours  multivitamin  Chewable 1 Tablet(s) Oral daily  oseltamivir 30 milliGRAM(s) Oral every 12 hours  polyethylene glycol 3350 17 Gram(s) Oral daily  risperiDONE   Tablet 0.5 milliGRAM(s) Oral at bedtime  senna 2 Tablet(s) Oral at bedtime  thiamine 100 milliGRAM(s) Oral daily    MEDICATIONS  (PRN):  dextrose Oral Gel 15 Gram(s) Oral once PRN Blood Glucose LESS THAN 70 milliGRAM(s)/deciliter  meclizine 25 milliGRAM(s) Oral every 12 hours PRN Dizziness   Anxiety

## 2025-01-16 NOTE — EEG REPORT - NS EEG TEXT BOX
[facility] Department of Neurology  Inpatient Routine-Electroencephalography Report    Patient Name:	URSULA ACKERMAN    :	1955  MRN:	[id number]    Study Date/Time:  1/15/2025, 3:21:22 PM  Referred by:  Dr. Cezar Hargrove    Brief Clinical History:  URSULA ACKERMAN is a 69 year old Male cortical atrophy, dizziness; study performed to investigate for seizures or markers of epilepsy.    Diagnosis Code: R56.9 convulsions/seizure  CPT:  60345 (awake/drowsy)    Pertinent Medications: Wellbutrin, klonopin, risperidone    Acquisition Details:  Electroencephalography was acquired using a minimum of 21 channels on an [employer] system v 9.3.1 with electrode placement according to the standard International 10-20 system following ACNS (American Clinical Neurophysiology Society) guidelines.  Anterior temporal T1 and T2 electrodes were utilized whenever possible.   The [employer] automated spike & seizure detections were all reviewed in detail, in addition to the entire raw EEG.    Findings:  Background:  continuous, with predominantly alpha and beta frequencies.  Voltage:  Normal (20+ uV)  Organization: Appropriate anterior-posterior gradient.  Posterior Dominant Rhythm:  8 hz, symmetric, well-organized, and well-modulated.  Variability: Yes. 		Reactivity: Yes.  N2 sleep: Absent.  Focal abnormalities:    1)	No persistent asymmetries of voltage or frequency.  Spontaneous Activity:    No epileptiform discharges.  Periodic/rhythmic activity:  None  Events:  No electrographic seizures or significant clinical events.  Provocations:  1)	Hyperventilation:  was not performed.  2)	Photic stimulation: was not performed.    Summary:  Normal  inpatient routine EEG study, awake and drowsy.  1)	The EEG remained normal throughout the study    Clinical Correlation:  There were no findings of active epilepsy, however this alone does not rule out the diagnosis.       Miya Blount DO  Attending Neurologist, Division of Epilepsy

## 2025-01-16 NOTE — PROGRESS NOTE ADULT - ASSESSMENT
69M PMH (per chart review) EtOH use disorder, depression, former smoker (20 pack year quit 15 years ago), HTN, hypothyroid, CAD, diabetes who was BIBEMS to [facility] from shelter for SOB, cough, fevers, and malaise. Transferred to MICU for septic shock 2/2 influenza now off pressors and weaned to nasal cannula.     NEURO   #MDD  #h/o schizoaffective disorder  Psychiatrist Dr. Corrales. Per chart review, has previously been prescribed Wellbutrin, risperidone, clonazepam, gabapentin; reports only taking Wellbutrin.   Home med: Wellbutrin SR 200mg BID, iSTOP with clonazepam filled monthly last filled 12/18 and per discussion today with patient's PCP he has most recently been rx'd risperidone, clonazepam, Wellbutrin (per the  shop last filled on 12/19/24)  - c/w buproprion 300mg qd (therapeutic interchange for Wellbutrin 150mg BID)   - c/w risperidone 0.5mg HS  - c/w clonazepam 0.5mg HS    #H/o Alcohol use disorder   ~2 drinks per week, Last drink 1/10. Per chart review, was empirically treated for Wernicke’s encephalopathy in 2022 at [facility]. Discussed with mary's PCP about AUD and she states he is not forthcoming about his alcohol intake and quantity and frequency is not clear.   - IV thiamine 500mg qd x3d  - c/w folic acid, multivitamin qd  - utox unable to be run x 2 due to acidic urine  - CIWA protocol will consider phenobarb protocol if CIWA > 8     #Chronic Dizziness and unsteady gait  Patient c/o dizziness x 2 weeks. Per patient's PCP he has been chronically been c/o dizziness x months and has been having gait difficulties  - Head CT w Moderate severity chronic white matter microvascular type changes.  - Neuro consult   - c/w meclizine PRN got 1 dose yesterday w some relief  - c/w high dose thiamine  - PT/OT consult     CV   #Septic Shock; resolved  #Sinus bradycardia; Resolved  #h/o HTN and HLD  s/p 3L fluids in [facility] ED, remained hypotensive sBP 80s, MAP low 60. TSH wnl. On Lisinopril/HCTZ outpatient  - Weaned levophed  - Midodrine increased to 10mg TID 1/13 PM --> increased to 20mg TID 1/15 PM  - started fludrocortisone 0.1mg qd 1/14 PM  - Watch HR closely w initiation of midodrine given h/o bradycardia  - Lipid panel (Total: 85 HDL: 31 LDL: 36) okay to keep of atorvastatin  - Maintain MAP>65   - s/p 500mL LR bolus this morning   - Hold BP meds    #Elevated troponin   #Elevated pro-BNP   Suspect 2/2 demand ischemia iso septic shock and SHIRA. EKG on presentation without signs of acute infarct.   Pro-BNP 3.5k on admission. Repeat EKG on admission shows sinus bradycardia without evidence of ischemia. QTc 414  - Troponin peaked @ 51   - TTE 1/13 normal systolic function    PULMONOLOGY  #Acute hypoxic respiratory failure  #Influenza PNA  CTA: negative for PE but showed bilateral lower lobe mucus plugging as well as bronchial wall thickening.   - Weaned to room air as tolerated, Maintain SpO2 goal >92%   - Duoneb q4h standing  - chest PT BID, incentive spirometry, aerobika   - Rest of plan as below    GI   #Transaminitis  , ALT 89. Ddx hypoperfusion iso septic shock vs alcohol hepatitis  - LFTs continue to downtrend  - Hepatitis panel negative   - RUQ not indicated no RUQ tenderness and LFTs almost normal     #Nutrition; severe, chronic protein-calorie malnutrition  - Regular diet w Ensure supplementation  - RD following and recs appreciated     RENAL/   #SHIRA likely prerenal 2/2 hypovolemia (Resolved)  Cr 1.3-1.4 in 2022 and per PCP 1.27 in August 2024. P/w Cr 2.24, suspect pre-renal iso septic shock and decreased po intake. UA spec grav elevated 1.046, CK 1026, FeNa 0.9%   - BMP daily   - Strict I/O    #Mild Hyponatremia(132) possibly 2/2 SIADH 2/2 influenza; appears euvolemic on exam  -Daily BMP  -If Na continues to downtrend will consider fluid restrictions    INFECTIOUS DISEASE  #Septic shock   Patient met sepsis criteria on admission (HR >90, RR >20). RVP +influenza A. UA neg for UTI. Lactate elevated to 4.6. Hypotension refractory to 3L fluids.    Lactate cleared s/p 3L fluids. s/p CTX 1g and azithromycin in ED. Procal (after receiving abx) 1.05.  - s/p CTX 1g/Azithro 500mg x 1 (1/12)  - c/w Tamiflu 30mg q24h x5d (1/12 - 1/16) - dose adjusted for CrCl increased to 30mg BID 1/13  - Will keep off abx for now if persistently febrile or if worsening shock will start CTX 2g/Azithro 500mg for CAP coverage  - f/u BCx x2 drawn on 1/12   - Urine legionella negative  - MRSA neg  - collect sputum cx if able to produce     ENDO   #Pre-diabetes  Prescribed metformin outpatient, has not been filled. A1c 6.4.  -SSI w premeal finger sticks    #R/o Hypothyroidism   Previously prescribed Synthroid 25mcg qd filled 12/12/25 per the  shop   - TSH 1.18 T4 4.52  - c/w home dose of levothyroxine 25mcg    HEME   #Anemia likely anemia of chronic disease or from sepsis   Hgb 10.3 s/p 3L fluid resuscitation. Possible h/o pernicious anemia per [facility] chart review.   - Folate 6.0, B12 596  - folic acid and MV as above  - Maintain active T&S  - Daily CBC    MSK  #Unsteady gait   - PT/OT consult    Prophylaxis:   F: s/p 500mL LR today  E: Replete K>4 Mg>2 Phos>3  D: Regular w Ensure  GI PPx: Not indicated  VTE PPx: SQH  Code status: Full  Dispo: 7LA    Emergency Contact: Not file  Lines: PIV x 1    [facility]  c: [phone]  p: [phone] 69M PMH (per chart review) EtOH use disorder, depression, former smoker (20 pack year quit 15 years ago), HTN, hypothyroid, CAD, diabetes who was BIBEMS to [facility] from shelter for SOB, cough, fevers, and malaise. Transferred to MICU for septic shock 2/2 influenza now off pressors and weaned to nasal cannula.     NEURO   #MDD  #h/o schizoaffective disorder  Psychiatrist Dr. Corrales. Per chart review, has previously been prescribed Wellbutrin, risperidone, clonazepam, gabapentin; reports only taking Wellbutrin.   Home med: Wellbutrin SR 200mg BID, iSTOP with clonazepam filled monthly last filled 12/18 and per discussion today with patient's PCP he has most recently been rx'd risperidone, clonazepam, Wellbutrin (per the  shop last filled on 12/19/24)  - c/w buproprion 300mg qd (therapeutic interchange for Wellbutrin 150mg BID)   - c/w risperidone 0.5mg HS  - c/w clonazepam 0.5mg HS    #H/o Alcohol use disorder   ~2 drinks per week, Last drink 1/10. Per chart review, was empirically treated for Wernicke’s encephalopathy in 2022 at [place]. Discussed with mary's PCP about AUD and she states he is not forthcoming about his alcohol intake and quantity and frequency is not clear.   - IV thiamine 500mg qd x3d then 100mg qd  - c/w folic acid 1mg qd, multivitamin 1 tablet qd  - utox unable to be run x 2 due to acidic urine  - CIWA protocol will consider phenobarb protocol if CIWA > 8     #Chronic Dizziness and unsteady gait  Patient c/o dizziness x 2 weeks. Per patient's PCP he has been chronically been c/o dizziness x months and has been having gait difficulties  - Head CT w Moderate severity chronic white matter microvascular type changes.  - Neuro consulted, f/u further recs  - vEEG read from 1/16: no evidence of seizures  - f/u MR Head w/wo IV contrast  - f/u MRA Neck w/wo IV contrast  - c/w meclizine 25mg q12h PRN   - c/w thiamine 100mg qd  - consider PT/OT consult    CV   #Septic Shock; resolved  #Sinus bradycardia; Resolved  #h/o HTN and HLD  s/p 3L fluids in [facility] ED, remained hypotensive sBP 80s, MAP low 60. TSH wnl. On Lisinopril/HCTZ outpatient  - Weaned off levophed  - Midodrine increased to 10mg TID 1/13 PM --> increased to 20mg TID 1/15 PM --> try to wean down on 1/17  - started fludrocortisone 0.1mg qd 1/14 PM --> discontinued 1/16  - Watch HR closely with initiation of midodrine given h/o bradycardia  - Lipid panel (Total: 85 HDL: 31 LDL: 36)   - c/w atorvastatin 20mg at bedtime  - Maintain MAP>65   - s/p 500mL LR bolus this morning   - Hold BP meds    #Elevated troponin   #Elevated pro-BNP   Suspect 2/2 demand ischemia iso septic shock and SHIRA. EKG on presentation without signs of acute infarct.   Pro-BNP 3.5k on admission. Repeat EKG on admission shows sinus bradycardia without evidence of ischemia.   QTc 414  - Troponin peaked @ 51   - TTE 1/13 normal systolic function    PULMONOLOGY  #Acute hypoxic respiratory failure  #Influenza PNA  CTA: negative for PE but showed bilateral lower lobe mucus plugging as well as bronchial wall thickening.   - Weaned to room air as tolerated, Maintain SpO2 goal >92%   - Duoneb q4h standing  - chest PT BID, incentive spirometry, aerobika     GI   #Transaminitis  , ALT 89. Ddx hypoperfusion iso septic shock vs alcohol hepatitis  - LFTs continue to downtrend  - Hepatitis panel negative   - RUQ not indicated no RUQ tenderness and LFTs almost normal     #Nutrition; severe, chronic protein-calorie malnutrition  - Regular diet with Ensure supplementation BID  - RD following and recs appreciated     RENAL/   #SHIRA likely prerenal 2/2 hypovolemia (Resolved)  Cr 1.3-1.4 in 2022 and per PCP 1.27 in August 2024. P/w Cr 2.24, suspect pre-renal iso septic shock and decreased po intake. UA spec grav elevated 1.046, CK 1026, FeNa 0.9%   - BMP daily   - Strict I/O    #Mild Hyponatremia(132) possibly 2/2 SIADH 2/2 influenza; appears euvolemic on exam  - Daily BMP  - If Na continues to downtrend will consider fluid restrictions    INFECTIOUS DISEASE  #Septic shock   Patient met sepsis criteria on admission (HR >90, RR >20). RVP +influenza A. UA neg for UTI. Lactate elevated to 4.6. Hypotension refractory to 3L fluids.    Lactate cleared s/p 3L fluids. s/p CTX 1g and azithromycin in ED. Procal (after receiving abx) 1.05.  - s/p CTX 1g/Azithro 500mg x 1 (1/12)  - c/w Tamiflu 30mg q24h x5d (1/12 - 1/16) - dose adjusted for CrCl increased to 30mg BID 1/13  - Will keep off abx for now if persistently febrile or if worsening shock will start CTX 2g/Azithro 500mg for CAP coverage  - BCx no growth at 72h  - Urine legionella negative  - MRSA neg  - collect sputum cx if able to produce     ENDO   #Pre-diabetes  Prescribed metformin outpatient, has not been filled. A1c 6.4.  -SSI w pre-meal finger sticks    #R/o Hypothyroidism   Previously prescribed Synthroid 25mcg qd filled 12/12/25 per the  shop   - TSH 1.18; T4 4.52  - c/w home dose of levothyroxine 25mcg    HEME   #Anemia likely anemia of chronic disease or from sepsis   Hgb 10.3 s/p 3L fluid resuscitation. Possible h/o pernicious anemia per [facility] chart review.   - Folate 6.0, B12 596  - folic acid and MV as above  - Maintain active T&S  - Daily CBC    MSK  #Unsteady gait   - consider PT/OT consult    Prophylaxis:   F: s/p 500mL LR  E: Replete K>4 Mg>2 Phos>3  D: Regular with Ensure High Protein BID  GI PPx: None  VTE PPx: Lovenox   Code status: Full  Dispo: 7LA

## 2025-01-17 LAB
ANION GAP SERPL CALC-SCNC: 11 MMOL/L — SIGNIFICANT CHANGE UP (ref 5–17)
BASOPHILS # BLD AUTO: 0.04 K/UL — SIGNIFICANT CHANGE UP (ref 0–0.2)
BASOPHILS NFR BLD AUTO: 0.5 % — SIGNIFICANT CHANGE UP (ref 0–2)
BLD GP AB SCN SERPL QL: NEGATIVE — SIGNIFICANT CHANGE UP
BUN SERPL-MCNC: 18 MG/DL — SIGNIFICANT CHANGE UP (ref 7–23)
CALCIUM SERPL-MCNC: 8 MG/DL — LOW (ref 8.4–10.5)
CHLORIDE SERPL-SCNC: 105 MMOL/L — SIGNIFICANT CHANGE UP (ref 96–108)
CO2 SERPL-SCNC: 23 MMOL/L — SIGNIFICANT CHANGE UP (ref 22–31)
CREAT SERPL-MCNC: 1.15 MG/DL — SIGNIFICANT CHANGE UP (ref 0.5–1.3)
CULTURE RESULTS: SIGNIFICANT CHANGE UP
CULTURE RESULTS: SIGNIFICANT CHANGE UP
EGFR: 69 ML/MIN/1.73M2 — SIGNIFICANT CHANGE UP
EOSINOPHIL # BLD AUTO: 0.22 K/UL — SIGNIFICANT CHANGE UP (ref 0–0.5)
EOSINOPHIL NFR BLD AUTO: 2.8 % — SIGNIFICANT CHANGE UP (ref 0–6)
GLUCOSE SERPL-MCNC: 105 MG/DL — HIGH (ref 70–99)
HCT VFR BLD CALC: 31.5 % — LOW (ref 39–50)
HGB BLD-MCNC: 10.4 G/DL — LOW (ref 13–17)
IMM GRANULOCYTES NFR BLD AUTO: 0.4 % — SIGNIFICANT CHANGE UP (ref 0–0.9)
LYMPHOCYTES # BLD AUTO: 3.38 K/UL — HIGH (ref 1–3.3)
LYMPHOCYTES # BLD AUTO: 42.6 % — SIGNIFICANT CHANGE UP (ref 13–44)
MAGNESIUM SERPL-MCNC: 1.7 MG/DL — SIGNIFICANT CHANGE UP (ref 1.6–2.6)
MCHC RBC-ENTMCNC: 32.3 PG — SIGNIFICANT CHANGE UP (ref 27–34)
MCHC RBC-ENTMCNC: 33 G/DL — SIGNIFICANT CHANGE UP (ref 32–36)
MCV RBC AUTO: 97.8 FL — SIGNIFICANT CHANGE UP (ref 80–100)
MONOCYTES # BLD AUTO: 0.65 K/UL — SIGNIFICANT CHANGE UP (ref 0–0.9)
MONOCYTES NFR BLD AUTO: 8.2 % — SIGNIFICANT CHANGE UP (ref 2–14)
NEUTROPHILS # BLD AUTO: 3.61 K/UL — SIGNIFICANT CHANGE UP (ref 1.8–7.4)
NEUTROPHILS NFR BLD AUTO: 45.5 % — SIGNIFICANT CHANGE UP (ref 43–77)
NRBC # BLD: 0 /100 WBCS — SIGNIFICANT CHANGE UP (ref 0–0)
NRBC BLD-RTO: 0 /100 WBCS — SIGNIFICANT CHANGE UP (ref 0–0)
PHOSPHATE SERPL-MCNC: 2.5 MG/DL — SIGNIFICANT CHANGE UP (ref 2.5–4.5)
PLATELET # BLD AUTO: 217 K/UL — SIGNIFICANT CHANGE UP (ref 150–400)
POTASSIUM SERPL-MCNC: 4.4 MMOL/L — SIGNIFICANT CHANGE UP (ref 3.5–5.3)
POTASSIUM SERPL-SCNC: 4.4 MMOL/L — SIGNIFICANT CHANGE UP (ref 3.5–5.3)
RBC # BLD: 3.22 M/UL — LOW (ref 4.2–5.8)
RBC # FLD: 14.3 % — SIGNIFICANT CHANGE UP (ref 10.3–14.5)
RH IG SCN BLD-IMP: POSITIVE — SIGNIFICANT CHANGE UP
SODIUM SERPL-SCNC: 139 MMOL/L — SIGNIFICANT CHANGE UP (ref 135–145)
SPECIMEN SOURCE: SIGNIFICANT CHANGE UP
SPECIMEN SOURCE: SIGNIFICANT CHANGE UP
WBC # BLD: 7.93 K/UL — SIGNIFICANT CHANGE UP (ref 3.8–10.5)
WBC # FLD AUTO: 7.93 K/UL — SIGNIFICANT CHANGE UP (ref 3.8–10.5)

## 2025-01-17 PROCEDURE — 99233 SBSQ HOSP IP/OBS HIGH 50: CPT | Mod: GC

## 2025-01-17 RX ORDER — CLONAZEPAM 2 MG
0.5 TABLET ORAL AT BEDTIME
Refills: 0 | Status: DISCONTINUED | OUTPATIENT
Start: 2025-01-17 | End: 2025-01-22

## 2025-01-17 RX ORDER — MAGNESIUM SULFATE 0.8 MEQ/ML
2 AMPUL (ML) INJECTION ONCE
Refills: 0 | Status: COMPLETED | OUTPATIENT
Start: 2025-01-17 | End: 2025-01-17

## 2025-01-17 RX ORDER — MIDODRINE HYDROCHLORIDE 5 MG/1
15 TABLET ORAL ONCE
Refills: 0 | Status: COMPLETED | OUTPATIENT
Start: 2025-01-17 | End: 2025-01-17

## 2025-01-17 RX ORDER — MIDODRINE HYDROCHLORIDE 5 MG/1
10 TABLET ORAL EVERY 8 HOURS
Refills: 0 | Status: DISCONTINUED | OUTPATIENT
Start: 2025-01-18 | End: 2025-01-19

## 2025-01-17 RX ORDER — ACETAMINOPHEN 160 MG/5ML
650 SUSPENSION ORAL ONCE
Refills: 0 | Status: COMPLETED | OUTPATIENT
Start: 2025-01-17 | End: 2025-01-17

## 2025-01-17 RX ADMIN — Medication 25 GRAM(S): at 14:16

## 2025-01-17 RX ADMIN — ACETAMINOPHEN 650 MILLIGRAM(S): 160 SUSPENSION ORAL at 15:19

## 2025-01-17 RX ADMIN — MIDODRINE HYDROCHLORIDE 15 MILLIGRAM(S): 5 TABLET ORAL at 21:39

## 2025-01-17 RX ADMIN — ATORVASTATIN CALCIUM 20 MILLIGRAM(S): 80 TABLET, FILM COATED ORAL at 21:39

## 2025-01-17 RX ADMIN — Medication 100 MILLIGRAM(S): at 11:28

## 2025-01-17 RX ADMIN — ACETAMINOPHEN 650 MILLIGRAM(S): 160 SUSPENSION ORAL at 15:30

## 2025-01-17 RX ADMIN — IPRATROPIUM BROMIDE AND ALBUTEROL SULFATE 3 MILLILITER(S): .5; 2.5 SOLUTION RESPIRATORY (INHALATION) at 11:28

## 2025-01-17 RX ADMIN — MIDODRINE HYDROCHLORIDE 15 MILLIGRAM(S): 5 TABLET ORAL at 05:31

## 2025-01-17 RX ADMIN — Medication 0.5 MILLIGRAM(S): at 21:38

## 2025-01-17 RX ADMIN — Medication 1000 UNIT(S): at 11:28

## 2025-01-17 RX ADMIN — MIDODRINE HYDROCHLORIDE 15 MILLIGRAM(S): 5 TABLET ORAL at 13:02

## 2025-01-17 RX ADMIN — IPRATROPIUM BROMIDE AND ALBUTEROL SULFATE 3 MILLILITER(S): .5; 2.5 SOLUTION RESPIRATORY (INHALATION) at 03:46

## 2025-01-17 RX ADMIN — OSELTAMIVIR PHOSPHATE 30 MILLIGRAM(S): 75 CAPSULE ORAL at 17:54

## 2025-01-17 RX ADMIN — IPRATROPIUM BROMIDE AND ALBUTEROL SULFATE 3 MILLILITER(S): .5; 2.5 SOLUTION RESPIRATORY (INHALATION) at 15:20

## 2025-01-17 RX ADMIN — OSELTAMIVIR PHOSPHATE 30 MILLIGRAM(S): 75 CAPSULE ORAL at 05:31

## 2025-01-17 RX ADMIN — ENOXAPARIN SODIUM 40 MILLIGRAM(S): 100 INJECTION SUBCUTANEOUS at 13:02

## 2025-01-17 RX ADMIN — ANTISEPTIC SURGICAL SCRUB 1 APPLICATION(S): 0.04 SOLUTION TOPICAL at 05:32

## 2025-01-17 RX ADMIN — IPRATROPIUM BROMIDE AND ALBUTEROL SULFATE 3 MILLILITER(S): .5; 2.5 SOLUTION RESPIRATORY (INHALATION) at 21:38

## 2025-01-17 RX ADMIN — Medication 2 TABLET(S): at 21:38

## 2025-01-17 RX ADMIN — LEVOTHYROXINE SODIUM 25 MICROGRAM(S): 25 TABLET ORAL at 05:31

## 2025-01-17 RX ADMIN — Medication 0.5 MILLIGRAM(S): at 21:39

## 2025-01-17 RX ADMIN — Medication 1 MILLIGRAM(S): at 11:28

## 2025-01-17 RX ADMIN — BUPROPION HYDROCHLORIDE 300 MILLIGRAM(S): 150 TABLET, EXTENDED RELEASE ORAL at 11:28

## 2025-01-17 RX ADMIN — Medication 1 TABLET(S): at 11:28

## 2025-01-17 RX ADMIN — POLYETHYLENE GLYCOL 3350 17 GRAM(S): 17 POWDER, FOR SOLUTION ORAL at 11:28

## 2025-01-17 NOTE — PROGRESS NOTE ADULT - SUBJECTIVE AND OBJECTIVE BOX
Physical Medicine and Rehabilitation Progress Note :       Patient is a 69y old  Male who presents with a chief complaint of fever, cough, SOB (17 Jan 2025 07:23)      HPI:  69M PMH (per chart review) EtOH use disorder, HTN, ?hypothyroidism, ?schizoaffective disorder, CAD, ?CKD, diabetes who was BIBEMS to [facility] from shelter for SOB, cough, fevers, and malaise.    Patient reports feeling generally weak that started this morning. Called EMS because he was trying to get back to his residence but it was too cold outside and he felt weak. Also endorsed SOB, cough, and tremulousness when he presented to the ED. According to EMS SpO2 on arrival was 90%, he was placed on 2L NC with mild improvement in symptoms. He denies any known sick contacts or recent travel. Denies chills, CP, n/v, c/d, dysuria, blood in urine or stool.   Per [facility], patient is regularly prescribed several medications (including but not limited to lisinopril, buproprion, clonazepam, risperidone, metformin, atorvastatin, levothyroxine, etc) but most prescriptions remain unfilled; some medications last filled in May 2024. Pt reports only taking Wellbutrin daily, last took this AM.     No current tobacco use, reports ~12 pack year smoking history. Reports ~2 drinks per week, last drink 3 days ago. Denies other recreational drug use. Lives in residence housing. PCP is his psychiatrist Dr. Corrales whose office is also in his resident housing.    In [facility] ED:    Vitals: T 100.1F rectal,  -> 62, /77 -> 85/53, RR 21 SpO2 92% on 2L NC   Labs: WBC 9.55, Hgb 13.1, plt 262. BUN 25, Cr 2.24. , ALT 89. Lactate 4.6 -> 1.7. Troponin I 89.7 -> 106.3. pro-BNP 3520. RVP +influenza A.    Imaging: CTA negative for PE, b/l lower lobe mucus plugging.   Interventions: ceftriaxone/azithro, tamiflu, IV Mg 2g for repletion, 3L LR, Duonebs, Tylenol  (13 Jan 2025 01:26)                            10.4   7.93  )-----------( 217      ( 17 Jan 2025 05:30 )             31.5       01-17    139  |  105  |  18  ----------------------------<  105[H]  4.4   |  23  |  1.15    Ca    8.0[L]      17 Jan 2025 05:30  Phos  2.5     01-17  Mg     1.7     01-17    TPro  5.3[L]  /  Alb  2.9[L]  /  TBili  0.3  /  DBili  x   /  AST  41[H]  /  ALT  38  /  AlkPhos  62  01-16    Vital Signs Last 24 Hrs  T(C): 36.7 (17 Jan 2025 06:15), Max: 36.8 (17 Jan 2025 00:03)  T(F): 98 (17 Jan 2025 06:15), Max: 98.2 (17 Jan 2025 00:03)  HR: 58 (17 Jan 2025 08:01) (58 - 66)  BP: 126/85 (17 Jan 2025 08:01) (104/68 - 137/76)  BP(mean): 100 (17 Jan 2025 08:01) (81 - 104)  RR: 16 (17 Jan 2025 08:01) (16 - 18)  SpO2: 97% (17 Jan 2025 08:01) (92% - 97%)    Parameters below as of 17 Jan 2025 08:27  Patient On (Oxygen Delivery Method): room air        MEDICATIONS  (STANDING):  albuterol/ipratropium for Nebulization 3 milliLiter(s) Nebulizer every 6 hours  atorvastatin 20 milliGRAM(s) Oral at bedtime  buPROPion XL (24-Hour) . 300 milliGRAM(s) Oral daily  chlorhexidine 2% Cloths 1 Application(s) Topical <User Schedule>  cholecalciferol 1000 Unit(s) Oral daily  clonazePAM  Tablet 0.5 milliGRAM(s) Oral at bedtime  dextrose 5%. 1000 milliLiter(s) (50 mL/Hr) IV Continuous <Continuous>  dextrose 5%. 1000 milliLiter(s) (100 mL/Hr) IV Continuous <Continuous>  dextrose 50% Injectable 25 Gram(s) IV Push once  dextrose 50% Injectable 12.5 Gram(s) IV Push once  dextrose 50% Injectable 25 Gram(s) IV Push once  enoxaparin Injectable 40 milliGRAM(s) SubCutaneous every 24 hours  folic acid 1 milliGRAM(s) Oral daily  glucagon  Injectable 1 milliGRAM(s) IntraMuscular once  influenza  Vaccine (HIGH DOSE) 0.5 milliLiter(s) IntraMuscular once  insulin lispro (ADMELOG) corrective regimen sliding scale   SubCutaneous Before meals and at bedtime  levothyroxine 25 MICROGram(s) Oral daily  midodrine 15 milliGRAM(s) Oral every 8 hours  multivitamin  Chewable 1 Tablet(s) Oral daily  oseltamivir 30 milliGRAM(s) Oral every 12 hours  polyethylene glycol 3350 17 Gram(s) Oral daily  risperiDONE   Tablet 0.5 milliGRAM(s) Oral at bedtime  senna 2 Tablet(s) Oral at bedtime  thiamine 100 milliGRAM(s) Oral daily    MEDICATIONS  (PRN):  dextrose Oral Gel 15 Gram(s) Oral once PRN Blood Glucose LESS THAN 70 milliGRAM(s)/deciliter  meclizine 25 milliGRAM(s) Oral every 12 hours PRN Dizziness      T(C): 36.7 (01-17-25 @ 06:15), Max: 36.8 (01-17-25 @ 00:03)  HR: 58 (01-17-25 @ 08:01) (58 - 66)  BP: 126/85 (01-17-25 @ 08:01) (104/68 - 137/76)  RR: 16 (01-17-25 @ 08:01) (16 - 18)  SpO2: 97% (01-17-25 @ 08:01) (92% - 97%)    Physical Exam:    on DROPLET INFL A isolation , 69 y o manlying comfortably in semi Cheung's position , awake , alert , no acute complaints     Head: normocephalic , atraumatic    Eyes: PERRLA , EOMI , no nystagmus , sclera anicteric    ENT / FACE: neg nasal discharge , uvula midline , no oropharyngeal erythema / exudate    Neck: supple , negative JVD , negative carotid bruits , no thyromegaly    Chest: estefanía end exp wheezes    Cardiovascular: regular rate and rhythm , neg murmurs / rubs / gallops    Abdomen: soft , non distended , no tenderness to palpation in all 4 quadrants ,  normal bowel sounds     Extremities: WWP , neg cyanosis /clubbing / edema     Neurologic Exam:     Alert and oriented x 3      Motor Exam:        > 4/5 x 4 extremities     Sensation:         intact to light touch x 4 extremities                              DTR:           biceps/brachioradialis: equal                            patella/ankle: equal       1/16/2025 Functional Status Assessment :       Pain Assessment/Number Scale (0-10) Adult  Presence of Pain: denies pain/discomfort (Rating = 0)  Pain Rating (0-10): Rest: 0 (no pain/absence of nonverbal indicators of pain)  Pain Rating (0-10): Activity: 0 (no pain/absence of nonverbal indicators of pain)    Re-assessment (Number Scale)  Pain Rating: Rest (Reassessment) Pain Rating: Rest: 0 (no pain/absence of nonverbal indicators of pain)  Pain Rating: Activity (Reassessment) Pain Rating: Activity: 0 (no pain/absence of nonverbal indicators of pain)    Safety      AM-PAC Functional Assessment: Basic Mobility  Type of Assessment: Daily assessment  Turning from your back to your side while in a flat bed without using bedrails?: 3 = A little assistance  Moving from lying on your back to sitting on the flat side of a flat bed without using bedrails?: 3 = A little assistance  Moving to and from a bed to a chair (including a wheelchair)?: 3 = A little assistance  Standing up from a chair using your arms (e.g. wheelchair or bedside chair)?: 2 = A lot of assistance  Walking in hospital room?: 3 = A little assistance  Climbing 3-5 steps with a railing?: 2-calculated by average   Score: 16   Row Comment: Ask the patient "How much help from another person do you currently need? (If the patient hasn't done an activity recently, how much help from another person do you think he/she needs if he/she tried?)    Cognitive/Neuro      Cognitive/Neuro/Behavioral  Level of Consciousness: alert  Arousal Level: arouses to voice  Orientation: oriented x 4  Speech: clear;  spontaneous  Mood/Behavior: cooperative;  behavior appropriate to situation    Language Assistance  Preferred Language to Address Healthcare Preferred Language to Address Healthcare: English    Respiratory      Respiratory Pre/Post Treatment Assessment  O2 Delivery/Oxygen Delivery Method Patient On (Oxygen Delivery Method): room air    Therapeutic Interventions      Bed Mobility  Bed Mobility Training Rehab Potential: good, to achieve stated therapy goals  Bed Mobility Training Rolling/Turning: independent;  bed rails  Bed Mobility Training Scooting: contact guard;  verbal cues  Bed Mobility Training Sit-to-Supine: NT- pt was left OOBTC  Bed Mobility Training Supine-to-Sit: minimum assist (75% patient effort);  1 person assist  Bed Mobility Training Limitations: decreased strength    Sit-Stand Transfer Training  Sit-to-Stand Transfer Training Rehab Potential: good, to achieve stated therapy goals  Sit-to-Stand Transfer Training Symptoms Noted During/After Treatment: dizziness  Transfer Training Sit-to-Stand Transfer: minimum assist (75% patient effort);  2 person assist;  progressing to MinAx1;  weight-bearing as tolerated   rolling walker  Transfer Training Stand-to-Sit Transfer: contact guard;  verbal cues;  1 person assist;  weight-bearing as tolerated   rolling walker  Sit-to-Stand Transfer Training Transfer Safety Analysis: decreased weight-shifting ability;  decreased strength;  impaired balance;  cognitive, decreased safety awareness    Gait Training  Gait Training Rehab Potential: good, to achieve stated therapy goals  Gait Training: contact guard;  1 person + 1 person to manage equipment;  weight-bearing as tolerated   rolling walker;  20 feet;  x2  Gait Analysis: decreased sofia;  increased time in double stance;  decreased step length;  decreased weight-shifting ability;  impaired balance  Type of Rest Type of Rest: sitting  Duration of Rest Duration of Rest: ~3min     Therapeutic Exercise  Therapeutic Exercise Detail: LAQ, AP, hip flexion x10 bilat             PM&R Impression : as above    Current disposition plan recommendation :    subacute rehab placement

## 2025-01-17 NOTE — PROGRESS NOTE ADULT - SUBJECTIVE AND OBJECTIVE BOX
INTERVAL/OVERNIGHT EVENTS: None    SUBJECTIVE:  Patient seen and examined at bedside, comfortable, NAD. Denied fever, chest pain, dyspnea, abdominal pain.     Vital Signs Last 12 Hrs  T(F): 98 (01-17-25 @ 06:15), Max: 98.2 (01-17-25 @ 00:03)  HR: 62 (01-17-25 @ 04:08) (58 - 66)  BP: 119/85 (01-17-25 @ 04:08) (105/66 - 126/90)  BP(mean): 99 (01-17-25 @ 04:08) (81 - 104)  RR: 18 (01-17-25 @ 04:08) (18 - 18)  SpO2: 94% (01-17-25 @ 04:08) (94% - 97%)  I&O's Summary    16 Jan 2025 07:01  -  17 Jan 2025 07:00  --------------------------------------------------------  IN: 0 mL / OUT: 600 mL / NET: -600 mL    PHYSICAL EXAM:  General: NAD  HEENT: PERRL, EOM intact, sclera anicteric, MMM  Cardiovascular: RRR; no MRG;   Respiratory: CTAB; no WRR  GI/: soft; NTND; BS x4  Extremities: WWP; 2+ peripheral pulses bilaterally; no LE edema  Skin: normal color & turgor; no rash  Neurologic: aox3; no focal deficits    LABS:                        10.0   6.28  )-----------( 196      ( 16 Jan 2025 11:37 )             29.9     01-16    138  |  106  |  19  ----------------------------<  137[H]  3.8   |  25  |  1.08    Ca    8.0[L]      16 Jan 2025 11:37  Phos  1.3     01-16  Mg     1.8     01-16    TPro  5.3[L]  /  Alb  2.9[L]  /  TBili  0.3  /  DBili  x   /  AST  41[H]  /  ALT  38  /  AlkPhos  62  01-16    Urinalysis Basic - ( 16 Jan 2025 11:37 )    Color: x / Appearance: x / SG: x / pH: x  Gluc: 137 mg/dL / Ketone: x  / Bili: x / Urobili: x   Blood: x / Protein: x / Nitrite: x   Leuk Esterase: x / RBC: x / WBC x   Sq Epi: x / Non Sq Epi: x / Bacteria: x    RADIOLOGY & ADDITIONAL TESTS:    MEDICATIONS  (STANDING):  albuterol/ipratropium for Nebulization 3 milliLiter(s) Nebulizer every 6 hours  atorvastatin 20 milliGRAM(s) Oral at bedtime  buPROPion XL (24-Hour) . 300 milliGRAM(s) Oral daily  chlorhexidine 2% Cloths 1 Application(s) Topical <User Schedule>  cholecalciferol 1000 Unit(s) Oral daily  clonazePAM  Tablet 0.5 milliGRAM(s) Oral at bedtime  dextrose 5%. 1000 milliLiter(s) (50 mL/Hr) IV Continuous <Continuous>  dextrose 5%. 1000 milliLiter(s) (100 mL/Hr) IV Continuous <Continuous>  dextrose 50% Injectable 25 Gram(s) IV Push once  dextrose 50% Injectable 12.5 Gram(s) IV Push once  dextrose 50% Injectable 25 Gram(s) IV Push once  enoxaparin Injectable 40 milliGRAM(s) SubCutaneous every 24 hours  folic acid 1 milliGRAM(s) Oral daily  glucagon  Injectable 1 milliGRAM(s) IntraMuscular once  influenza  Vaccine (HIGH DOSE) 0.5 milliLiter(s) IntraMuscular once  insulin lispro (ADMELOG) corrective regimen sliding scale   SubCutaneous Before meals and at bedtime  levothyroxine 25 MICROGram(s) Oral daily  midodrine 15 milliGRAM(s) Oral every 8 hours  multivitamin  Chewable 1 Tablet(s) Oral daily  oseltamivir 30 milliGRAM(s) Oral every 12 hours  polyethylene glycol 3350 17 Gram(s) Oral daily  risperiDONE   Tablet 0.5 milliGRAM(s) Oral at bedtime  senna 2 Tablet(s) Oral at bedtime  thiamine 100 milliGRAM(s) Oral daily    MEDICATIONS  (PRN):  dextrose Oral Gel 15 Gram(s) Oral once PRN Blood Glucose LESS THAN 70 milliGRAM(s)/deciliter  meclizine 25 milliGRAM(s) Oral every 12 hours PRN Dizziness   INTERVAL/OVERNIGHT EVENTS: None    SUBJECTIVE: Patient seen and examined at bedside, comfortable, NAD. He states that he needs to urinate but otherwise he has no other concerns.    Vital Signs Last 12 Hrs  T(F): 98 (01-17-25 @ 06:15), Max: 98.2 (01-17-25 @ 00:03)  HR: 62 (01-17-25 @ 04:08) (58 - 66)  BP: 119/85 (01-17-25 @ 04:08) (105/66 - 126/90)  BP(mean): 99 (01-17-25 @ 04:08) (81 - 104)  RR: 18 (01-17-25 @ 04:08) (18 - 18)  SpO2: 94% (01-17-25 @ 04:08) (94% - 97%)  I&O's Summary    16 Jan 2025 07:01  -  17 Jan 2025 07:00  --------------------------------------------------------  IN: 0 mL / OUT: 600 mL / NET: -600 mL    PHYSICAL EXAM:  General: NAD, sitting comfortably in bed eating breakfast  HEENT: PERRL, EOM intact, sclera anicteric  Cardiovascular: RRR; no MRG  Respiratory: CTAB; no WRR  GI/: soft; NTND  Extremities: WWP; 2+ peripheral pulses bilaterally; no LE edema  Skin: normal color & turgor; no rash noted  Neurologic: aox2 to person and general place; no focal deficits; strength intact; a little altered    LABS:                        10.0   6.28  )-----------( 196      ( 16 Jan 2025 11:37 )             29.9     01-16    138  |  106  |  19  ----------------------------<  137[H]  3.8   |  25  |  1.08    Ca    8.0[L]      16 Jan 2025 11:37  Phos  1.3     01-16  Mg     1.8     01-16    TPro  5.3[L]  /  Alb  2.9[L]  /  TBili  0.3  /  DBili  x   /  AST  41[H]  /  ALT  38  /  AlkPhos  62  01-16    Urinalysis Basic - ( 16 Jan 2025 11:37 )    Color: x / Appearance: x / SG: x / pH: x  Gluc: 137 mg/dL / Ketone: x  / Bili: x / Urobili: x   Blood: x / Protein: x / Nitrite: x   Leuk Esterase: x / RBC: x / WBC x   Sq Epi: x / Non Sq Epi: x / Bacteria: x    RADIOLOGY & ADDITIONAL TESTS:    MEDICATIONS  (STANDING):  albuterol/ipratropium for Nebulization 3 milliLiter(s) Nebulizer every 6 hours  atorvastatin 20 milliGRAM(s) Oral at bedtime  buPROPion XL (24-Hour) . 300 milliGRAM(s) Oral daily  chlorhexidine 2% Cloths 1 Application(s) Topical <User Schedule>  cholecalciferol 1000 Unit(s) Oral daily  clonazePAM  Tablet 0.5 milliGRAM(s) Oral at bedtime  dextrose 5%. 1000 milliLiter(s) (50 mL/Hr) IV Continuous <Continuous>  dextrose 5%. 1000 milliLiter(s) (100 mL/Hr) IV Continuous <Continuous>  dextrose 50% Injectable 25 Gram(s) IV Push once  dextrose 50% Injectable 12.5 Gram(s) IV Push once  dextrose 50% Injectable 25 Gram(s) IV Push once  enoxaparin Injectable 40 milliGRAM(s) SubCutaneous every 24 hours  folic acid 1 milliGRAM(s) Oral daily  glucagon  Injectable 1 milliGRAM(s) IntraMuscular once  influenza  Vaccine (HIGH DOSE) 0.5 milliLiter(s) IntraMuscular once  insulin lispro (ADMELOG) corrective regimen sliding scale   SubCutaneous Before meals and at bedtime  levothyroxine 25 MICROGram(s) Oral daily  midodrine 15 milliGRAM(s) Oral every 8 hours  multivitamin  Chewable 1 Tablet(s) Oral daily  oseltamivir 30 milliGRAM(s) Oral every 12 hours  polyethylene glycol 3350 17 Gram(s) Oral daily  risperiDONE   Tablet 0.5 milliGRAM(s) Oral at bedtime  senna 2 Tablet(s) Oral at bedtime  thiamine 100 milliGRAM(s) Oral daily    MEDICATIONS  (PRN):  dextrose Oral Gel 15 Gram(s) Oral once PRN Blood Glucose LESS THAN 70 milliGRAM(s)/deciliter  meclizine 25 milliGRAM(s) Oral every 12 hours PRN Dizziness

## 2025-01-17 NOTE — PROGRESS NOTE ADULT - ASSESSMENT
I M    69 y o M PMH (per chart review) EtOH use disorder, depression, former smoker (20 pack year quit 15 years ago), HTN, hypothyroid, CAD, diabetes who was BIBEMS to [facility] from shelter for SOB, cough, fevers, and malaise. Transferred to MICU for septic shock 2/2 influenza now off pressors and weaned to nasal cannula.     NEURO   #MDD  #h/o schizoaffective disorder  Psychiatrist Dr. Corrales. Per chart review, has previously been prescribed Wellbutrin, risperidone, clonazepam, gabapentin; reports only taking Wellbutrin.   Home med: Wellbutrin SR 200mg BID, iSTOP with clonazepam filled monthly last filled 12/18 and per discussion today with patient's PCP he has most recently been rx'd risperidone, clonazepam, Wellbutrin (per the  shop last filled on 12/19/24)  - c/w buproprion 300mg qd (therapeutic interchange for Wellbutrin 150mg BID)   - c/w risperidone 0.5mg HS  - c/w clonazepam 0.5mg HS    #H/o Alcohol use disorder   ~2 drinks per week, Last drink 1/10. Per chart review, was empirically treated for Wernicke’s encephalopathy in 2022 at [place]. Discussed with mary's PCP about AUD and she states he is not forthcoming about his alcohol intake and quantity and frequency is not clear.   - IV thiamine 500mg qd x3d then 100mg qd  - c/w folic acid 1mg qd, multivitamin 1 tablet qd  - utox unable to be run x 2 due to acidic urine  - CIWA protocol will consider phenobarb protocol if CIWA > 8     #Chronic Dizziness and unsteady gait  Patient c/o dizziness x 2 weeks. Per patient's PCP he has been chronically been c/o dizziness x months and has been having gait difficulties  - Head CT w Moderate severity chronic white matter microvascular type changes.  - Neuro consulted, f/u further recs  - vEEG read from 1/16: no evidence of seizures  - f/u MR Head w/wo IV contrast  - f/u MRA Neck w/wo IV contrast  - c/w meclizine 25mg q12h PRN   - c/w thiamine 100mg qd  - consider PT/OT consult    CV   #Septic Shock; resolved  #Sinus bradycardia; Resolved  #h/o HTN and HLD  s/p 3L fluids in [facility] ED, remained hypotensive sBP 80s, MAP low 60. TSH wnl. On Lisinopril/HCTZ outpatient  - Weaned off levophed  - Midodrine increased to 10mg TID 1/13 PM --> increased to 20mg TID 1/15 PM --> try to wean down on 1/17  - started fludrocortisone 0.1mg qd 1/14 PM --> discontinued 1/16  - Watch HR closely with initiation of midodrine given h/o bradycardia  - Lipid panel (Total: 85 HDL: 31 LDL: 36)   - c/w atorvastatin 20mg at bedtime  - Maintain MAP>65   - s/p 500mL LR bolus this morning   - Hold BP meds    #Elevated troponin   #Elevated pro-BNP   Suspect 2/2 demand ischemia iso septic shock and SHIRA. EKG on presentation without signs of acute infarct.   Pro-BNP 3.5k on admission. Repeat EKG on admission shows sinus bradycardia without evidence of ischemia.   QTc 414  - Troponin peaked @ 51   - TTE 1/13 normal systolic function    PULMONOLOGY  #Acute hypoxic respiratory failure  #Influenza PNA  CTA: negative for PE but showed bilateral lower lobe mucus plugging as well as bronchial wall thickening.   - Weaned to room air as tolerated, Maintain SpO2 goal >92%   - Duoneb q4h standing  - chest PT BID, incentive spirometry, aerobika     GI   #Transaminitis  , ALT 89. Ddx hypoperfusion iso septic shock vs alcohol hepatitis  - LFTs continue to downtrend  - Hepatitis panel negative   - RUQ not indicated no RUQ tenderness and LFTs almost normal     #Nutrition; severe, chronic protein-calorie malnutrition  - Regular diet with Ensure supplementation BID  - RD following and recs appreciated     RENAL/   #SHIRA likely prerenal 2/2 hypovolemia (Resolved)  Cr 1.3-1.4 in 2022 and per PCP 1.27 in August 2024. P/w Cr 2.24, suspect pre-renal iso septic shock and decreased po intake. UA spec grav elevated 1.046, CK 1026, FeNa 0.9%   - BMP daily   - Strict I/O    #Mild Hyponatremia(132) possibly 2/2 SIADH 2/2 influenza; appears euvolemic on exam  - Daily BMP  - If Na continues to downtrend will consider fluid restrictions    INFECTIOUS DISEASE  #Septic shock   Patient met sepsis criteria on admission (HR >90, RR >20). RVP +influenza A. UA neg for UTI. Lactate elevated to 4.6. Hypotension refractory to 3L fluids.    Lactate cleared s/p 3L fluids. s/p CTX 1g and azithromycin in ED. Procal (after receiving abx) 1.05.  - s/p CTX 1g/Azithro 500mg x 1 (1/12)  - c/w Tamiflu 30mg q24h x5d (1/12 - 1/16) - dose adjusted for CrCl increased to 30mg BID 1/13  - Will keep off abx for now if persistently febrile or if worsening shock will start CTX 2g/Azithro 500mg for CAP coverage  - BCx no growth at 72h  - Urine legionella negative  - MRSA neg  - collect sputum cx if able to produce     ENDO   #Pre-diabetes  Prescribed metformin outpatient, has not been filled. A1c 6.4.  -SSI w pre-meal finger sticks    #R/o Hypothyroidism   Previously prescribed Synthroid 25mcg qd filled 12/12/25 per the  shop   - TSH 1.18; T4 4.52  - c/w home dose of levothyroxine 25mcg    HEME   #Anemia likely anemia of chronic disease or from sepsis   Hgb 10.3 s/p 3L fluid resuscitation. Possible h/o pernicious anemia per [facility] chart review.   - Folate 6.0, B12 596  - folic acid and MV as above  - Maintain active T&S  - Daily CBC    MSK  #Unsteady gait   - consider PT/OT consult    Prophylaxis:   F: s/p 500mL LR  E: Replete K>4 Mg>2 Phos>3  D: Regular with Ensure High Protein BID  GI PPx: None  VTE PPx: Lovenox   Code status: Full  Dispo: 7LA      Nutritional Assessment:  · Nutritional Assessment	This patient has been assessed with a concern for Malnutrition and has been determined to have a diagnosis/diagnoses of Severe protein-calorie malnutrition.    This patient is being managed with:   Diet Regular-  Supplement Feeding Modality:  Oral  Ensure Plus High Protein Cans or Servings Per Day:  1       Frequency:  Two Times a day  Entered: [place] 15 2025 12:24PM

## 2025-01-17 NOTE — PROGRESS NOTE ADULT - ASSESSMENT
69M PMH (per chart review) EtOH use disorder, depression, former smoker (20 pack year quit 15 years ago), HTN, hypothyroid, CAD, diabetes who was BIBEMS to [facility] from shelter for SOB, cough, fevers, and malaise. Transferred to MICU for septic shock 2/2 influenza now off pressors and weaned to nasal cannula.     NEURO   #MDD  #h/o schizoaffective disorder  Psychiatrist Dr. Corrales. Per chart review, has previously been prescribed Wellbutrin, risperidone, clonazepam, gabapentin; reports only taking Wellbutrin.   Home med: Wellbutrin SR 200mg BID, iSTOP with clonazepam filled monthly last filled 12/18 and per discussion today with patient's PCP he has most recently been rx'd risperidone, clonazepam, Wellbutrin (per the  shop last filled on 12/19/24)  - c/w buproprion 300mg qd (therapeutic interchange for Wellbutrin 150mg BID)   - c/w risperidone 0.5mg HS  - c/w clonazepam 0.5mg HS    #H/o Alcohol use disorder   ~2 drinks per week, Last drink 1/10. Per chart review, was empirically treated for Wernicke’s encephalopathy in 2022 at [place]. Discussed with mary's PCP about AUD and she states he is not forthcoming about his alcohol intake and quantity and frequency is not clear.   - IV thiamine 500mg qd x3d then 100mg qd  - c/w folic acid 1mg qd, multivitamin 1 tablet qd  - utox unable to be run x 2 due to acidic urine  - CIWA protocol will consider phenobarb protocol if CIWA > 8     #Chronic Dizziness and unsteady gait  Patient c/o dizziness x 2 weeks. Per patient's PCP he has been chronically been c/o dizziness x months and has been having gait difficulties  - Head CT w Moderate severity chronic white matter microvascular type changes.  - Neuro consulted, f/u further recs  - vEEG read from 1/16: no evidence of seizures  - f/u MR Head w/wo IV contrast  - f/u MRA Neck w/wo IV contrast  - c/w meclizine 25mg q12h PRN   - c/w thiamine 100mg qd  - consider PT/OT consult    CV   #Septic Shock; resolved  #Sinus bradycardia; Resolved  #h/o HTN and HLD  s/p 3L fluids in [facility] ED, remained hypotensive sBP 80s, MAP low 60. TSH wnl. On Lisinopril/HCTZ outpatient  - Weaned off levophed  - Midodrine increased to 10mg TID 1/13 PM --> increased to 20mg TID 1/15 PM --> try to wean down on 1/17  - started fludrocortisone 0.1mg qd 1/14 PM --> discontinued 1/16  - Watch HR closely with initiation of midodrine given h/o bradycardia  - Lipid panel (Total: 85 HDL: 31 LDL: 36)   - c/w atorvastatin 20mg at bedtime  - Maintain MAP>65   - s/p 500mL LR bolus this morning   - Hold BP meds    #Elevated troponin   #Elevated pro-BNP   Suspect 2/2 demand ischemia iso septic shock and SHIRA. EKG on presentation without signs of acute infarct.   Pro-BNP 3.5k on admission. Repeat EKG on admission shows sinus bradycardia without evidence of ischemia.   QTc 414  - Troponin peaked @ 51   - TTE 1/13 normal systolic function    PULMONOLOGY  #Acute hypoxic respiratory failure  #Influenza PNA  CTA: negative for PE but showed bilateral lower lobe mucus plugging as well as bronchial wall thickening.   - Weaned to room air as tolerated, Maintain SpO2 goal >92%   - Duoneb q4h standing  - chest PT BID, incentive spirometry, aerobika     GI   #Transaminitis  , ALT 89. Ddx hypoperfusion iso septic shock vs alcohol hepatitis  - LFTs continue to downtrend  - Hepatitis panel negative   - RUQ not indicated no RUQ tenderness and LFTs almost normal     #Nutrition; severe, chronic protein-calorie malnutrition  - Regular diet with Ensure supplementation BID  - RD following and recs appreciated     RENAL/   #SHIRA likely prerenal 2/2 hypovolemia (Resolved)  Cr 1.3-1.4 in 2022 and per PCP 1.27 in August 2024. P/w Cr 2.24, suspect pre-renal iso septic shock and decreased po intake. UA spec grav elevated 1.046, CK 1026, FeNa 0.9%   - BMP daily   - Strict I/O    #Mild Hyponatremia(132) possibly 2/2 SIADH 2/2 influenza; appears euvolemic on exam  - Daily BMP  - If Na continues to downtrend will consider fluid restrictions    INFECTIOUS DISEASE  #Septic shock   Patient met sepsis criteria on admission (HR >90, RR >20). RVP +influenza A. UA neg for UTI. Lactate elevated to 4.6. Hypotension refractory to 3L fluids.    Lactate cleared s/p 3L fluids. s/p CTX 1g and azithromycin in ED. Procal (after receiving abx) 1.05.  - s/p CTX 1g/Azithro 500mg x 1 (1/12)  - c/w Tamiflu 30mg q24h x5d (1/12 - 1/16) - dose adjusted for CrCl increased to 30mg BID 1/13  - Will keep off abx for now if persistently febrile or if worsening shock will start CTX 2g/Azithro 500mg for CAP coverage  - BCx no growth at 72h  - Urine legionella negative  - MRSA neg  - collect sputum cx if able to produce     ENDO   #Pre-diabetes  Prescribed metformin outpatient, has not been filled. A1c 6.4.  -SSI w pre-meal finger sticks    #R/o Hypothyroidism   Previously prescribed Synthroid 25mcg qd filled 12/12/25 per the  shop   - TSH 1.18; T4 4.52  - c/w home dose of levothyroxine 25mcg    HEME   #Anemia likely anemia of chronic disease or from sepsis   Hgb 10.3 s/p 3L fluid resuscitation. Possible h/o pernicious anemia per [facility] chart review.   - Folate 6.0, B12 596  - folic acid and MV as above  - Maintain active T&S  - Daily CBC    MSK  #Unsteady gait   - consider PT/OT consult    Prophylaxis:   F: s/p 500mL LR  E: Replete K>4 Mg>2 Phos>3  D: Regular with Ensure High Protein BID  GI PPx: None  VTE PPx: Lovenox   Code status: Full  Dispo: 7LA 69M PMH (per chart review) EtOH use disorder, depression, former smoker (20 pack year quit 15 years ago), HTN, hypothyroid, CAD, diabetes who was BIBEMS to [facility] from shelter for SOB, cough, fevers, and malaise. Transferred to MICU for septic shock 2/2 influenza now off pressors and weaned to nasal cannula.     NEURO   #MDD  #h/o schizoaffective disorder  Psychiatrist Dr. Corrales. Per chart review, has previously been prescribed Wellbutrin, risperidone, clonazepam, gabapentin; reports only taking Wellbutrin.   Home med: Wellbutrin SR 200mg BID, iSTOP with clonazepam filled monthly last filled 12/18 and per discussion today with patient's PCP he has most recently been rx'd risperidone, clonazepam, Wellbutrin (per the  shop last filled on 12/19/24)  - c/w buproprion 300mg qd (therapeutic interchange for Wellbutrin 150mg BID)   - c/w risperidone 0.5mg HS  - c/w clonazepam 0.5mg HS    #H/o Alcohol use disorder   ~2 drinks per week, Last drink 1/10. Per chart review, was empirically treated for Wernicke’s encephalopathy in 2022 at [place]. Discussed with mary's PCP about AUD and she states he is not forthcoming about his alcohol intake and quantity and frequency is not clear.   - IV thiamine 500mg qd x3d then 100mg qd  - c/w folic acid 1mg qd, multivitamin 1 tablet qd  - utox unable to be run x 2 due to acidic urine  - CIWA protocol will consider phenobarb protocol if CIWA > 8     #Chronic Dizziness and unsteady gait  Patient c/o dizziness x 2 weeks. Per patient's PCP he has been chronically been c/o dizziness x months and has been having gait difficulties  - Head CT w Moderate severity chronic white matter microvascular type changes.  - Neuro consulted, f/u further recs  - vEEG read from 1/16: no evidence of seizures  - f/u MR Head w/wo IV contrast  - f/u MRA Neck w/wo IV contrast  - c/w meclizine 25mg q12h PRN   - c/w thiamine 100mg qd  - consider PT/OT consult    CV   #Septic Shock; resolved  #Sinus bradycardia; Resolved  #h/o HTN and HLD  s/p 3L fluids in [facility] ED, remained hypotensive sBP 80s, MAP low 60. TSH wnl. On Lisinopril/HCTZ outpatient  - Weaned off levophed  - Midodrine increased to 10mg TID 1/13 PM --> increased to 20mg TID 1/15 PM --> weaned down on 1/17 to 15mg TID  - started fludrocortisone 0.1mg qd 1/14 PM --> discontinued 1/16  - Watch HR closely with initiation of midodrine given h/o bradycardia  - Lipid panel (Total: 85 HDL: 31 LDL: 36)   - c/w atorvastatin 20mg at bedtime  - Maintain MAP>65   - s/p 500mL LR bolus this morning   - Hold BP meds    #Elevated troponin   #Elevated pro-BNP   Suspect 2/2 demand ischemia iso septic shock and SHIRA. EKG on presentation without signs of acute infarct.   Pro-BNP 3.5k on admission. Repeat EKG on admission shows sinus bradycardia without evidence of ischemia.   QTc 414  - Troponin peaked @ 51   - TTE 1/13 normal systolic function    PULMONOLOGY  #Acute hypoxic respiratory failure  #Influenza PNA  CTA: negative for PE but showed bilateral lower lobe mucus plugging as well as bronchial wall thickening.   - Weaned to room air as tolerated, Maintain SpO2 goal >92%   - Duoneb q4h standing  - chest PT BID, incentive spirometry, aerobika     GI   #Transaminitis  , ALT 89. Ddx hypoperfusion iso septic shock vs alcohol hepatitis  - LFTs continue to downtrend  - Hepatitis panel negative   - RUQ not indicated no RUQ tenderness and LFTs almost normal     #Nutrition; severe, chronic protein-calorie malnutrition  - Regular diet with Ensure supplementation BID  - RD following and recs appreciated     RENAL/   #SHIRA likely prerenal 2/2 hypovolemia (Resolved)  Cr 1.3-1.4 in 2022 and per PCP 1.27 in August 2024. P/w Cr 2.24, suspect pre-renal iso septic shock and decreased po intake. UA spec grav elevated 1.046, CK 1026, FeNa 0.9%   - BMP daily   - Strict I/O    #Mild Hyponatremia(132) possibly 2/2 SIADH 2/2 influenza; appears euvolemic on exam  - Daily BMP  - If Na continues to downtrend will consider fluid restrictions    INFECTIOUS DISEASE  #Septic shock   Patient met sepsis criteria on admission (HR >90, RR >20). RVP +influenza A. UA neg for UTI. Lactate elevated to 4.6. Hypotension refractory to 3L fluids.    Lactate cleared s/p 3L fluids. s/p CTX 1g and azithromycin in ED. Procal (after receiving abx) 1.05.  - s/p CTX 1g/Azithro 500mg x 1 (1/12)  - c/w Tamiflu 30mg q24h x5d (1/12 - 1/16) - dose adjusted for CrCl increased to 30mg BID 1/13  - Will keep off abx for now if persistently febrile or if worsening shock will start CTX 2g/Azithro 500mg for CAP coverage  - BCx no growth at 72h  - Urine legionella negative  - MRSA neg  - collect sputum cx if able to produce     ENDO   #Pre-diabetes  Prescribed metformin outpatient, has not been filled. A1c 6.4.  -SSI w pre-meal finger sticks    #R/o Hypothyroidism   Previously prescribed Synthroid 25mcg qd filled 12/12/25 per the  shop   - TSH 1.18; T4 4.52  - c/w home dose of levothyroxine 25mcg    HEME   #Anemia likely anemia of chronic disease or from sepsis   Hgb 10.3 s/p 3L fluid resuscitation. Possible h/o pernicious anemia per [facility] chart review.   - Folate 6.0, B12 596  - folic acid and MV as above  - Maintain active T&S  - Daily CBC    MSK  #Unsteady gait   - PT/OT consult    Prophylaxis:   F: s/p 500mL LR  E: Replete K>4 Mg>2 Phos>3  D: Regular with Ensure High Protein BID  GI PPx: None  VTE PPx: Lovenox   Code status: Full  Dispo: 7LA

## 2025-01-18 DIAGNOSIS — Z29.9 ENCOUNTER FOR PROPHYLACTIC MEASURES, UNSPECIFIED: ICD-10-CM

## 2025-01-18 DIAGNOSIS — R26.81 UNSTEADINESS ON FEET: ICD-10-CM

## 2025-01-18 DIAGNOSIS — N17.9 ACUTE KIDNEY FAILURE, UNSPECIFIED: ICD-10-CM

## 2025-01-18 DIAGNOSIS — F32.9 MAJOR DEPRESSIVE DISORDER, SINGLE EPISODE, UNSPECIFIED: ICD-10-CM

## 2025-01-18 DIAGNOSIS — E03.9 HYPOTHYROIDISM, UNSPECIFIED: ICD-10-CM

## 2025-01-18 DIAGNOSIS — Z87.898 PERSONAL HISTORY OF OTHER SPECIFIED CONDITIONS: ICD-10-CM

## 2025-01-18 DIAGNOSIS — J10.00 INFLUENZA DUE TO OTHER IDENTIFIED INFLUENZA VIRUS WITH UNSPECIFIED TYPE OF PNEUMONIA: ICD-10-CM

## 2025-01-18 DIAGNOSIS — D64.9 ANEMIA, UNSPECIFIED: ICD-10-CM

## 2025-01-18 DIAGNOSIS — E87.1 HYPO-OSMOLALITY AND HYPONATREMIA: ICD-10-CM

## 2025-01-18 DIAGNOSIS — R74.01 ELEVATION OF LEVELS OF LIVER TRANSAMINASE LEVELS: ICD-10-CM

## 2025-01-18 DIAGNOSIS — A41.9 SEPSIS, UNSPECIFIED ORGANISM: ICD-10-CM

## 2025-01-18 LAB
ANION GAP SERPL CALC-SCNC: 11 MMOL/L — SIGNIFICANT CHANGE UP (ref 5–17)
BASOPHILS # BLD AUTO: 0.04 K/UL — SIGNIFICANT CHANGE UP (ref 0–0.2)
BASOPHILS NFR BLD AUTO: 0.5 % — SIGNIFICANT CHANGE UP (ref 0–2)
BUN SERPL-MCNC: 17 MG/DL — SIGNIFICANT CHANGE UP (ref 7–23)
CALCIUM SERPL-MCNC: 8.4 MG/DL — SIGNIFICANT CHANGE UP (ref 8.4–10.5)
CHLORIDE SERPL-SCNC: 102 MMOL/L — SIGNIFICANT CHANGE UP (ref 96–108)
CO2 SERPL-SCNC: 25 MMOL/L — SIGNIFICANT CHANGE UP (ref 22–31)
CREAT SERPL-MCNC: 1.09 MG/DL — SIGNIFICANT CHANGE UP (ref 0.5–1.3)
EGFR: 73 ML/MIN/1.73M2 — SIGNIFICANT CHANGE UP
EOSINOPHIL # BLD AUTO: 0.39 K/UL — SIGNIFICANT CHANGE UP (ref 0–0.5)
EOSINOPHIL NFR BLD AUTO: 5 % — SIGNIFICANT CHANGE UP (ref 0–6)
GLUCOSE SERPL-MCNC: 101 MG/DL — HIGH (ref 70–99)
HCT VFR BLD CALC: 30.9 % — LOW (ref 39–50)
HGB BLD-MCNC: 10.5 G/DL — LOW (ref 13–17)
IMM GRANULOCYTES NFR BLD AUTO: 0.5 % — SIGNIFICANT CHANGE UP (ref 0–0.9)
LYMPHOCYTES # BLD AUTO: 3.09 K/UL — SIGNIFICANT CHANGE UP (ref 1–3.3)
LYMPHOCYTES # BLD AUTO: 39.6 % — SIGNIFICANT CHANGE UP (ref 13–44)
MAGNESIUM SERPL-MCNC: 1.9 MG/DL — SIGNIFICANT CHANGE UP (ref 1.6–2.6)
MCHC RBC-ENTMCNC: 32.8 PG — SIGNIFICANT CHANGE UP (ref 27–34)
MCHC RBC-ENTMCNC: 34 G/DL — SIGNIFICANT CHANGE UP (ref 32–36)
MCV RBC AUTO: 96.6 FL — SIGNIFICANT CHANGE UP (ref 80–100)
MONOCYTES # BLD AUTO: 0.81 K/UL — SIGNIFICANT CHANGE UP (ref 0–0.9)
MONOCYTES NFR BLD AUTO: 10.4 % — SIGNIFICANT CHANGE UP (ref 2–14)
NEUTROPHILS # BLD AUTO: 3.44 K/UL — SIGNIFICANT CHANGE UP (ref 1.8–7.4)
NEUTROPHILS NFR BLD AUTO: 44 % — SIGNIFICANT CHANGE UP (ref 43–77)
NRBC # BLD: 0 /100 WBCS — SIGNIFICANT CHANGE UP (ref 0–0)
NRBC BLD-RTO: 0 /100 WBCS — SIGNIFICANT CHANGE UP (ref 0–0)
PHOSPHATE SERPL-MCNC: 2.7 MG/DL — SIGNIFICANT CHANGE UP (ref 2.5–4.5)
PLATELET # BLD AUTO: 246 K/UL — SIGNIFICANT CHANGE UP (ref 150–400)
POTASSIUM SERPL-MCNC: 4 MMOL/L — SIGNIFICANT CHANGE UP (ref 3.5–5.3)
POTASSIUM SERPL-SCNC: 4 MMOL/L — SIGNIFICANT CHANGE UP (ref 3.5–5.3)
RBC # BLD: 3.2 M/UL — LOW (ref 4.2–5.8)
RBC # FLD: 14.1 % — SIGNIFICANT CHANGE UP (ref 10.3–14.5)
SODIUM SERPL-SCNC: 138 MMOL/L — SIGNIFICANT CHANGE UP (ref 135–145)
WBC # BLD: 7.81 K/UL — SIGNIFICANT CHANGE UP (ref 3.8–10.5)
WBC # FLD AUTO: 7.81 K/UL — SIGNIFICANT CHANGE UP (ref 3.8–10.5)

## 2025-01-18 PROCEDURE — 70553 MRI BRAIN STEM W/O & W/DYE: CPT | Mod: 26

## 2025-01-18 PROCEDURE — 70547 MR ANGIOGRAPHY NECK W/O DYE: CPT | Mod: 26

## 2025-01-18 PROCEDURE — 99233 SBSQ HOSP IP/OBS HIGH 50: CPT | Mod: GC

## 2025-01-18 PROCEDURE — 99223 1ST HOSP IP/OBS HIGH 75: CPT | Mod: GC

## 2025-01-18 RX ADMIN — Medication 1 MILLIGRAM(S): at 12:07

## 2025-01-18 RX ADMIN — MIDODRINE HYDROCHLORIDE 10 MILLIGRAM(S): 5 TABLET ORAL at 23:01

## 2025-01-18 RX ADMIN — Medication 1 TABLET(S): at 12:07

## 2025-01-18 RX ADMIN — Medication 2 TABLET(S): at 23:01

## 2025-01-18 RX ADMIN — IPRATROPIUM BROMIDE AND ALBUTEROL SULFATE 3 MILLILITER(S): .5; 2.5 SOLUTION RESPIRATORY (INHALATION) at 03:48

## 2025-01-18 RX ADMIN — Medication 0.5 MILLIGRAM(S): at 23:01

## 2025-01-18 RX ADMIN — ENOXAPARIN SODIUM 40 MILLIGRAM(S): 100 INJECTION SUBCUTANEOUS at 12:10

## 2025-01-18 RX ADMIN — BUPROPION HYDROCHLORIDE 300 MILLIGRAM(S): 150 TABLET, EXTENDED RELEASE ORAL at 12:07

## 2025-01-18 RX ADMIN — LEVOTHYROXINE SODIUM 25 MICROGRAM(S): 25 TABLET ORAL at 06:29

## 2025-01-18 RX ADMIN — IPRATROPIUM BROMIDE AND ALBUTEROL SULFATE 3 MILLILITER(S): .5; 2.5 SOLUTION RESPIRATORY (INHALATION) at 12:07

## 2025-01-18 RX ADMIN — MIDODRINE HYDROCHLORIDE 10 MILLIGRAM(S): 5 TABLET ORAL at 06:29

## 2025-01-18 RX ADMIN — MIDODRINE HYDROCHLORIDE 10 MILLIGRAM(S): 5 TABLET ORAL at 13:46

## 2025-01-18 RX ADMIN — Medication 0.5 MILLIGRAM(S): at 23:00

## 2025-01-18 RX ADMIN — IPRATROPIUM BROMIDE AND ALBUTEROL SULFATE 3 MILLILITER(S): .5; 2.5 SOLUTION RESPIRATORY (INHALATION) at 23:03

## 2025-01-18 RX ADMIN — IPRATROPIUM BROMIDE AND ALBUTEROL SULFATE 3 MILLILITER(S): .5; 2.5 SOLUTION RESPIRATORY (INHALATION) at 17:40

## 2025-01-18 RX ADMIN — ATORVASTATIN CALCIUM 20 MILLIGRAM(S): 80 TABLET, FILM COATED ORAL at 23:01

## 2025-01-18 RX ADMIN — Medication 100 MILLIGRAM(S): at 12:07

## 2025-01-18 RX ADMIN — Medication 1000 UNIT(S): at 12:07

## 2025-01-18 NOTE — PROGRESS NOTE ADULT - PROBLEM SELECTOR PLAN 10
Anemia likely anemia of chronic disease or from sepsis   Hgb 10.3 s/p 3L fluid resuscitation. Possible h/o pernicious anemia per [facility] chart review.   - Folate 6.0, B12 596  - folic acid and MV as above  - Maintain active T&S  - Daily CBC

## 2025-01-18 NOTE — PROGRESS NOTE ADULT - PROBLEM SELECTOR PLAN 4
#Chronic Dizziness and unsteady gait  Patient c/o dizziness x 2 weeks. Per patient's PCP he has been chronically been c/o dizziness x months and has been having gait difficulties  - Head CT w Moderate severity chronic white matter microvascular type changes.  - Neuro consulted, f/u further recs  - vEEG read from 1/16: no evidence of seizures  - f/u MR Head w/wo IV contrast  - f/u MRA Neck w/wo IV contrast  - c/w meclizine 25mg q12h PRN   - c/w thiamine 100mg qd  - consider PT/OT consult #Chronic Dizziness and unsteady gait  Patient c/o dizziness x 2 weeks. Per patient's PCP he has been chronically been c/o dizziness x months and has been having gait difficulties  - Head CT w Moderate severity chronic white matter microvascular type changes.  - Neuro consulted, f/u further recs  - vEEG read from 1/16: no evidence of seizures  - f/u MR Head w/wo IV contrast  - f/u MRA Neck w/wo IV contrast  - c/w meclizine 25mg q12h PRN   - c/w thiamine 100mg qd  - consider PT/OT consult  - fu neuro and stroke recs

## 2025-01-18 NOTE — CONSULT NOTE ADULT - SUBJECTIVE AND OBJECTIVE BOX
**STROKE CODE CONSULT NOTE**    Last known well time/Time of onset of symptoms: 2 weeks and 4 days ago    HPI: As per chart: "69M PMH (per chart review) EtOH use disorder, HTN, ?hypothyroidism, ?schizoaffective disorder, CAD, ?CKD, diabetes who was BIBEMS to [facility] from shelter for SOB, cough, fevers, and malaise admitted to MICU for septic shock 2/2 influenza. Labs significant for lactic acidosis, pre-renal SHIRA, and transaminitis c/w septic shock, pt was hypotensive to SBP 80s and MAP 60 s/p 3L LR after which pt was started on Levo, given CTX x1, Azithro x1, was started on Tamiflu. Since admission to MICU pt has been continue on Tamiflu and monitored off Abx w/ BCx NGTD, UA neg, UStrep neg, ULeg neg, MRSA neg. POCUS was also without gross consolidation or inflitrate. TTE 1/13 unremarkable. Pt was started on Midodrine 5mg q8 and subsequently weaned off pressors as of 5am 1/14, after which pt received 1L LR and Midodrine was uptitrated to 20mg q8hrs for SBP 80s, MAP 60 on 1/15. Received collateral from outpatient provider that patient baseline BP is hypertensive if anything, not hypotensive. Neurology consulted for dizziness. He says he started with dizziness, described as disequilibrium (rocking on a ship), 2 weeks ago (however per medicine team may have started months ago as they called his PCP) with associated hearing loss and tinnitus (buzzing). He says his symptoms are intermittent, feels asymptomatic when lying down but has the sensation when he stands up and tries to walk. No visual symptoms. He thinks the symptoms started gradually but has trouble pinpointing an exact time at onset. No other alleviating or exacerbating symptoms. Pt was started on fludrocortisone, high dose thiamine, underwent head CT showing chronic microvascular white matter changes, and pt is now pending EEG to assess for cerebral dysfunction/slowing and MRI head/neck. His MRI was notable for an acute/subacute stroke in the posterior circulation which was originally ordered to identify his dizziness." Stroke consulted for management of ischemic stroke.    T(C): 36.6 (01-18-25 @ 17:16), Max: 36.6 (01-18-25 @ 17:16)  HR: 62 (01-18-25 @ 16:35) (50 - 64)  BP: 127/84 (01-18-25 @ 16:35) (108/62 - 149/99)  RR: 17 (01-18-25 @ 16:35) (16 - 18)  SpO2: 97% (01-18-25 @ 16:35) (96% - 100%)    PAST MEDICAL & SURGICAL HISTORY:  H/O: depression    SOCIAL HISTORY:   No current tobacco use, reports ~12 pack year smoking history. Reports ~2 drinks per week, last drink 3 days ago. Denies other recreational drug use. Lives in residence housing.   Per chart review, h/o EtOH use disorder, was empirically treated for Wernicke encephalopathy in 2022 at [place].    ROS: ***  Constitutional: No fever, weight loss or fatigue  Eyes: No eye pain, visual disturbances, or discharge  ENMT:  No difficulty hearing, tinnitus; No sinus or throat pain  Neck: No pain or stiffness  Respiratory: No cough, wheezing, chills or hemoptysis  Cardiovascular: No chest pain, palpitations, shortness of breath, or leg swelling  Gastrointestinal: No abdominal pain. No nausea, vomiting or hematemesis; No diarrhea or constipation. Nohematochezia.  Genitourinary: No dysuria, frequency, hematuria or incontinence  Neurological: As per HPI  Skin: No itching, burning, rashes or lesions   Endocrine: No heat or cold intolerance; No hair loss  Musculoskeletal: No joint pain or swelling; No muscle, back or extremity pain  Heme/Lymph: No easy bruising or bleeding gums    MEDICATIONS  (STANDING):  albuterol/ipratropium for Nebulization 3 milliLiter(s) Nebulizer every 6 hours  atorvastatin 20 milliGRAM(s) Oral at bedtime  buPROPion XL (24-Hour) . 300 milliGRAM(s) Oral daily  cholecalciferol 1000 Unit(s) Oral daily  clonazePAM  Tablet 0.5 milliGRAM(s) Oral at bedtime  dextrose 5%. 1000 milliLiter(s) (100 mL/Hr) IV Continuous <Continuous>  dextrose 5%. 1000 milliLiter(s) (50 mL/Hr) IV Continuous <Continuous>  dextrose 50% Injectable 25 Gram(s) IV Push once  dextrose 50% Injectable 12.5 Gram(s) IV Push once  dextrose 50% Injectable 25 Gram(s) IV Push once  enoxaparin Injectable 40 milliGRAM(s) SubCutaneous every 24 hours  folic acid 1 milliGRAM(s) Oral daily  glucagon  Injectable 1 milliGRAM(s) IntraMuscular once  influenza  Vaccine (HIGH DOSE) 0.5 milliLiter(s) IntraMuscular once  insulin lispro (ADMELOG) corrective regimen sliding scale   SubCutaneous Before meals and at bedtime  levothyroxine 25 MICROGram(s) Oral daily  midodrine 10 milliGRAM(s) Oral every 8 hours  multivitamin  Chewable 1 Tablet(s) Oral daily  polyethylene glycol 3350 17 Gram(s) Oral daily  risperiDONE   Tablet 0.5 milliGRAM(s) Oral at bedtime  senna 2 Tablet(s) Oral at bedtime  thiamine 100 milliGRAM(s) Oral daily    MEDICATIONS  (PRN):  dextrose Oral Gel 15 Gram(s) Oral once PRN Blood Glucose LESS THAN 70 milliGRAM(s)/deciliter  meclizine 25 milliGRAM(s) Oral every 12 hours PRN Dizziness    Allergies    No Known Allergies    Intolerances      Vital Signs Last 24 Hrs  T(C): 36.6 (18 Jan 2025 17:16), Max: 36.6 (18 Jan 2025 17:16)  T(F): 97.8 (18 Jan 2025 17:16), Max: 97.8 (18 Jan 2025 17:16)  HR: 62 (18 Jan 2025 16:35) (50 - 64)  BP: 127/84 (18 Jan 2025 16:35) (108/62 - 149/99)  BP(mean): 100 (18 Jan 2025 16:35) (80 - 118)  RR: 17 (18 Jan 2025 16:35) (16 - 18)  SpO2: 97% (18 Jan 2025 16:35) (96% - 100%)    Parameters below as of 18 Jan 2025 16:35  Patient On (Oxygen Delivery Method): room air        Physical exam:  Constitutional: No acute distress, conversant  Eyes: Anicteric sclerae, moist conjunctivae, see below for CNs  Neck: trachea midline, FROM, supple, no thyromegaly or lymphadenopathy  Cardiovascular: Regular rate and rhythm, no murmurs, rubs, or gallops. No carotid bruits.   Pulmonary: Anterior breath sounds clear bilaterally, no crackles or wheezing. No use of accessory muscles  GI: Abdomen soft, non-distended, non-tender  Extremities: Radial and DP pulses +2, no edema    Neurologic:  -Mental status: Awake, alert, oriented to person, place, and time. Speech is fluent with intact naming, repetition, and comprehension, no dysarthria. Recent and remote memory intact. Follows commands. Attention/concentration intact. Fund of knowledge appropriate.  -Cranial nerves:   II: Visual fields are full to confrontation.  III, IV, VI: Extraocular movements are intact without nystagmus. Pupils equally round and reactive to light  V:  Facial sensation V1-V3 equal and intact   VII: Face is symmetric with normal eye closure and smile  VIII: Hearing is bilaterally intact to finger rub  IX, X: Uvula is midline and soft palate rises symmetrically  XI: Head turning and shoulder shrug are intact.  XII: Tongue protrudes midline  Motor: Normal bulk and tone. No pronator drift. Strength bilateral upper extremity 5/5, bilateral lower extremities 5/5.  Rapid alternating movements intact and symmetric  Sensation: Intact to light touch bilaterally. No neglect or extinction on double simultaneous testing.  Coordination: No dysmetria on finger-to-nose and heel-to-shin bilaterally  Reflexes: Downgoing toes bilaterally   Gait: Narrow gait and steady    NIHSS: 0 ASPECT Score: 0    Fingerstick Blood Glucose: CAPILLARY BLOOD GLUCOSE      POCT Blood Glucose.: 122 mg/dL (18 Jan 2025 16:29)    LABS:                        10.5   7.81  )-----------( 246      ( 18 Jan 2025 05:30 )             30.9     01-18    138  |  102  |  17  ----------------------------<  101[H]  4.0   |  25  |  1.09    Ca    8.4      18 Jan 2025 05:30  Phos  2.7     01-18  Mg     1.9     01-18    Urinalysis Basic - ( 18 Jan 2025 05:30 )    Color: x / Appearance: x / SG: x / pH: x  Gluc: 101 mg/dL / Ketone: x  / Bili: x / Urobili: x   Blood: x / Protein: x / Nitrite: x   Leuk Esterase: x / RBC: x / WBC x   Sq Epi: x / Non Sq Epi: x / Bacteria: x  **STROKE CODE CONSULT NOTE**    Last known well time/Time of onset of symptoms: 2 weeks and 4 days ago    HPI: As per chart: "69M PMH (per chart review) EtOH use disorder, HTN, ?hypothyroidism, ?schizoaffective disorder, CAD, ?CKD, diabetes who was BIBEMS to [facility] from shelter for SOB, cough, fevers, and malaise admitted to MICU for septic shock 2/2 influenza. Labs significant for lactic acidosis, pre-renal SHIRA, and transaminitis c/w septic shock, pt was hypotensive to SBP 80s and MAP 60 s/p 3L LR after which pt was started on Levo, given CTX x1, Azithro x1, was started on Tamiflu. Since admission to MICU pt has been continue on Tamiflu and monitored off Abx w/ BCx NGTD, UA neg, UStrep neg, ULeg neg, MRSA neg. POCUS was also without gross consolidation or inflitrate. TTE 1/13 unremarkable. Pt was started on Midodrine 5mg q8 and subsequently weaned off pressors as of 5am 1/14, after which pt received 1L LR and Midodrine was uptitrated to 20mg q8hrs for SBP 80s, MAP 60 on 1/15. Received collateral from outpatient provider that patient baseline BP is hypertensive if anything, not hypotensive. Neurology consulted for dizziness. He says he started with dizziness, described as disequilibrium (rocking on a ship), 2 weeks ago (however per medicine team may have started months ago as they called his PCP) with associated hearing loss and tinnitus (buzzing). He says his symptoms are intermittent, feels asymptomatic when lying down but has the sensation when he stands up and tries to walk. No visual symptoms. He thinks the symptoms started gradually but has trouble pinpointing an exact time at onset. No other alleviating or exacerbating symptoms. Pt was started on fludrocortisone, high dose thiamine, underwent head CT showing chronic microvascular white matter changes, and pt is now pending EEG to assess for cerebral dysfunction/slowing and MRI head/neck. His MRI was notable for an acute/subacute stroke in the posterior circulation which was originally ordered to identify his dizziness." Stroke consulted for management of ischemic stroke.    T(C): 36.6 (01-18-25 @ 17:16), Max: 36.6 (01-18-25 @ 17:16)  HR: 62 (01-18-25 @ 16:35) (50 - 64)  BP: 127/84 (01-18-25 @ 16:35) (108/62 - 149/99)  RR: 17 (01-18-25 @ 16:35) (16 - 18)  SpO2: 97% (01-18-25 @ 16:35) (96% - 100%)    PAST MEDICAL & SURGICAL HISTORY:  H/O: depression    SOCIAL HISTORY:   No current tobacco use, reports ~12 pack year smoking history. Reports ~2 drinks per week, last drink 3 days ago. Denies other recreational drug use. Lives in residence housing.   Per chart review, h/o EtOH use disorder, was empirically treated for Wernicke encephalopathy in 2022 at [place].    ROS: ***  Constitutional: No fever, weight loss or fatigue  Eyes: No eye pain, visual disturbances, or discharge  ENMT:  No difficulty hearing, tinnitus; No sinus or throat pain  Neck: No pain or stiffness  Respiratory: No cough, wheezing, chills or hemoptysis  Cardiovascular: No chest pain, palpitations, shortness of breath, or leg swelling  Gastrointestinal: No abdominal pain. No nausea, vomiting or hematemesis; No diarrhea or constipation. Nohematochezia.  Genitourinary: No dysuria, frequency, hematuria or incontinence  Neurological: As per HPI  Skin: No itching, burning, rashes or lesions   Endocrine: No heat or cold intolerance; No hair loss  Musculoskeletal: No joint pain or swelling; No muscle, back or extremity pain  Heme/Lymph: No easy bruising or bleeding gums    MEDICATIONS  (STANDING):  albuterol/ipratropium for Nebulization 3 milliLiter(s) Nebulizer every 6 hours  atorvastatin 20 milliGRAM(s) Oral at bedtime  buPROPion XL (24-Hour) . 300 milliGRAM(s) Oral daily  cholecalciferol 1000 Unit(s) Oral daily  clonazePAM  Tablet 0.5 milliGRAM(s) Oral at bedtime  dextrose 5%. 1000 milliLiter(s) (100 mL/Hr) IV Continuous <Continuous>  dextrose 5%. 1000 milliLiter(s) (50 mL/Hr) IV Continuous <Continuous>  dextrose 50% Injectable 25 Gram(s) IV Push once  dextrose 50% Injectable 12.5 Gram(s) IV Push once  dextrose 50% Injectable 25 Gram(s) IV Push once  enoxaparin Injectable 40 milliGRAM(s) SubCutaneous every 24 hours  folic acid 1 milliGRAM(s) Oral daily  glucagon  Injectable 1 milliGRAM(s) IntraMuscular once  influenza  Vaccine (HIGH DOSE) 0.5 milliLiter(s) IntraMuscular once  insulin lispro (ADMELOG) corrective regimen sliding scale   SubCutaneous Before meals and at bedtime  levothyroxine 25 MICROGram(s) Oral daily  midodrine 10 milliGRAM(s) Oral every 8 hours  multivitamin  Chewable 1 Tablet(s) Oral daily  polyethylene glycol 3350 17 Gram(s) Oral daily  risperiDONE   Tablet 0.5 milliGRAM(s) Oral at bedtime  senna 2 Tablet(s) Oral at bedtime  thiamine 100 milliGRAM(s) Oral daily    MEDICATIONS  (PRN):  dextrose Oral Gel 15 Gram(s) Oral once PRN Blood Glucose LESS THAN 70 milliGRAM(s)/deciliter  meclizine 25 milliGRAM(s) Oral every 12 hours PRN Dizziness    Allergies  No Known Allergies    Vital Signs Last 24 Hrs  T(C): 36.6 (18 Jan 2025 17:16), Max: 36.6 (18 Jan 2025 17:16)  T(F): 97.8 (18 Jan 2025 17:16), Max: 97.8 (18 Jan 2025 17:16)  HR: 62 (18 Jan 2025 16:35) (50 - 64)  BP: 127/84 (18 Jan 2025 16:35) (108/62 - 149/99)  BP(mean): 100 (18 Jan 2025 16:35) (80 - 118)  RR: 17 (18 Jan 2025 16:35) (16 - 18)  SpO2: 97% (18 Jan 2025 16:35) (96% - 100%)    Parameters below as of 18 Jan 2025 16:35  Patient On (Oxygen Delivery Method): room air    Physical exam:  Neurologic:  -Mental status: Awake, alert, oriented to person, place, and time. Speech is fluent with intact naming, repetition, and comprehension, no dysarthria. Recent and remote memory intact. Follows commands. Attention/concentration intact. Fund of knowledge appropriate.  -Cranial nerves:   II: Visual fields are full to confrontation.  III, IV, VI: Extraocular movements are intact without nystagmus. Pupils equally round and reactive to light  V:  Facial sensation V1-V3 equal and intact   VII: Face is symmetric with normal eye closure and smile  VIII: Hearing is bilaterally intact to finger rub  IX, X: Uvula is midline and soft palate rises symmetrically  XI: Head turning and shoulder shrug are intact.  XII: Tongue protrudes midline  Motor: Normal bulk and tone. No pronator drift. Strength bilateral upper extremity 5/5, bilateral lower extremities 5/5.  Rapid alternating movements intact and symmetric  Sensation: Intact to light touch bilaterally. No neglect or extinction on double simultaneous testing.  Coordination: No dysmetria on finger-to-nose and heel-to-shin bilaterally  Reflexes: Downgoing toes bilaterally   Gait: Narrow gait and steady    NIHSS: 0 ASPECT Score: 0    Fingerstick Blood Glucose: CAPILLARY BLOOD GLUCOSE      POCT Blood Glucose.: 122 mg/dL (18 Jan 2025 16:29)    LABS:                        10.5   7.81  )-----------( 246      ( 18 Jan 2025 05:30 )             30.9     01-18    138  |  102  |  17  ----------------------------<  101[H]  4.0   |  25  |  1.09    Ca    8.4      18 Jan 2025 05:30  Phos  2.7     01-18  Mg     1.9     01-18    Urinalysis Basic - ( 18 Jan 2025 05:30 )    Color: x / Appearance: x / SG: x / pH: x  Gluc: 101 mg/dL / Ketone: x  / Bili: x / Urobili: x   Blood: x / Protein: x / Nitrite: x   Leuk Esterase: x / RBC: x / WBC x   Sq Epi: x / Non Sq Epi: x / Bacteria: x

## 2025-01-18 NOTE — PROGRESS NOTE ADULT - PROBLEM SELECTOR PLAN 1
#Acute hypoxic respiratory failure  #Influenza PNA  CTA: negative for PE but showed bilateral lower lobe mucus plugging as well as bronchial wall thickening.   s/p 3L fluids in [facility] ED, remained hypotensive sBP 80s, MAP low 60. TSH wnl. On Lisinopril/HCTZ outpatient  - Weaned off levophed  - Midodrine increased to 10mg TID 1/13 PM --> increased to 20mg TID 1/15 PM --> weaned down on 1/17 to 15mg TID  - started fludrocortisone 0.1mg qd 1/14 PM --> discontinued 1/16  - Watch HR closely with initiation of midodrine given h/o bradycardia  - Lipid panel (Total: 85 HDL: 31 LDL: 36)   - c/w atorvastatin 20mg at bedtime  - Maintain MAP>65   - s/p 500mL LR bolus this morning   - Hold BP meds  - Weaned to room air as tolerated, Maintain SpO2 goal >92%   - Duoneb q4h standing  - chest PT BID, incentive spirometry, aerobika

## 2025-01-18 NOTE — PROGRESS NOTE ADULT - ASSESSMENT
69M PMH (per chart review) EtOH use disorder, depression, former smoker (20 pack year quit 15 years ago), HTN, hypothyroid, CAD, diabetes who was BIBEMS to [facility] from shelter for SOB, cough, fevers, and malaise. Transferred to MICU for septic shock 2/2 influenza now off pressors and weaned to nasal cannula.     NEURO   #MDD  #h/o schizoaffective disorder  Psychiatrist Dr. Corrales. Per chart review, has previously been prescribed Wellbutrin, risperidone, clonazepam, gabapentin; reports only taking Wellbutrin.   Home med: Wellbutrin SR 200mg BID, iSTOP with clonazepam filled monthly last filled 12/18 and per discussion today with patient's PCP he has most recently been rx'd risperidone, clonazepam, Wellbutrin (per the  shop last filled on 12/19/24)  - c/w buproprion 300mg qd (therapeutic interchange for Wellbutrin 150mg BID)   - c/w risperidone 0.5mg HS  - c/w clonazepam 0.5mg HS    #H/o Alcohol use disorder   ~2 drinks per week, Last drink 1/10. Per chart review, was empirically treated for Wernicke’s encephalopathy in 2022 at [place]. Discussed with mary's PCP about AUD and she states he is not forthcoming about his alcohol intake and quantity and frequency is not clear.   - IV thiamine 500mg qd x3d then 100mg qd  - c/w folic acid 1mg qd, multivitamin 1 tablet qd  - utox unable to be run x 2 due to acidic urine  - CIWA protocol will consider phenobarb protocol if CIWA > 8     #Chronic Dizziness and unsteady gait  Patient c/o dizziness x 2 weeks. Per patient's PCP he has been chronically been c/o dizziness x months and has been having gait difficulties  - Head CT w Moderate severity chronic white matter microvascular type changes.  - Neuro consulted, f/u further recs  - vEEG read from 1/16: no evidence of seizures  - f/u MR Head w/wo IV contrast  - f/u MRA Neck w/wo IV contrast  - c/w meclizine 25mg q12h PRN   - c/w thiamine 100mg qd  - consider PT/OT consult    CV   #Septic Shock; resolved  #Sinus bradycardia; Resolved  #h/o HTN and HLD  s/p 3L fluids in [facility] ED, remained hypotensive sBP 80s, MAP low 60. TSH wnl. On Lisinopril/HCTZ outpatient  - Weaned off levophed  - Midodrine increased to 10mg TID 1/13 PM --> increased to 20mg TID 1/15 PM --> weaned down on 1/17 to 15mg TID -> weaned to 10mg TID on 1/18/2025  - started fludrocortisone 0.1mg qd 1/14 PM --> discontinued 1/16  - Watch HR closely with initiation of midodrine given h/o bradycardia  - Lipid panel (Total: 85 HDL: 31 LDL: 36)   - c/w atorvastatin 20mg at bedtime  - Maintain MAP>65   - s/p 500mL LR bolus this morning   - Hold BP meds    #Elevated troponin   #Elevated pro-BNP   Suspect 2/2 demand ischemia iso septic shock and SHIRA. EKG on presentation without signs of acute infarct.   Pro-BNP 3.5k on admission. Repeat EKG on admission shows sinus bradycardia without evidence of ischemia.   QTc 414  - Troponin peaked @ 51   - TTE 1/13 normal systolic function    PULMONOLOGY  #Acute hypoxic respiratory failure  #Influenza PNA  CTA: negative for PE but showed bilateral lower lobe mucus plugging as well as bronchial wall thickening.   - Weaned to room air as tolerated, Maintain SpO2 goal >92%   - Duoneb q4h standing  - chest PT BID, incentive spirometry, aerobika     GI   #Transaminitis  , ALT 89. Ddx hypoperfusion iso septic shock vs alcohol hepatitis  - LFTs continue to downtrend  - Hepatitis panel negative   - RUQ not indicated no RUQ tenderness and LFTs almost normal     #Nutrition; severe, chronic protein-calorie malnutrition  - Regular diet with Ensure supplementation BID  - RD following and recs appreciated     RENAL/   #SHIRA likely prerenal 2/2 hypovolemia (Resolved)  Cr 1.3-1.4 in 2022 and per PCP 1.27 in August 2024. P/w Cr 2.24, suspect pre-renal iso septic shock and decreased po intake. UA spec grav elevated 1.046, CK 1026, FeNa 0.9%   - BMP daily   - Strict I/O    #Mild Hyponatremia(132) possibly 2/2 SIADH 2/2 influenza; appears euvolemic on exam  - Daily BMP  - If Na continues to downtrend will consider fluid restrictions    INFECTIOUS DISEASE  #Septic shock   Patient met sepsis criteria on admission (HR >90, RR >20). RVP +influenza A. UA neg for UTI. Lactate elevated to 4.6. Hypotension refractory to 3L fluids.    Lactate cleared s/p 3L fluids. s/p CTX 1g and azithromycin in ED. Procal (after receiving abx) 1.05.  - s/p CTX 1g/Azithro 500mg x 1 (1/12)  - c/w Tamiflu 30mg q24h x5d (1/12 - 1/16) - dose adjusted for CrCl increased to 30mg BID 1/13  - Will keep off abx for now if persistently febrile or if worsening shock will start CTX 2g/Azithro 500mg for CAP coverage  - BCx no growth at 72h  - Urine legionella negative  - MRSA neg  - collect sputum cx if able to produce     ENDO   #Pre-diabetes  Prescribed metformin outpatient, has not been filled. A1c 6.4.  -SSI w pre-meal finger sticks    #R/o Hypothyroidism   Previously prescribed Synthroid 25mcg qd filled 12/12/25 per the  shop   - TSH 1.18; T4 4.52  - c/w home dose of levothyroxine 25mcg    HEME   #Anemia likely anemia of chronic disease or from sepsis   Hgb 10.3 s/p 3L fluid resuscitation. Possible h/o pernicious anemia per [facility] chart review.   - Folate 6.0, B12 596  - folic acid and MV as above  - Maintain active T&S  - Daily CBC    MSK  #Unsteady gait   - PT/OT consult    Prophylaxis:   F: s/p 500mL LR  E: Replete K>4 Mg>2 Phos>3  D: Regular with Ensure High Protein BID  GI PPx: None  VTE PPx: Lovenox   Code status: Full  Dispo: 7LA

## 2025-01-18 NOTE — CONSULT NOTE ADULT - PROVIDER SPECIALTY LIST ADULT
Chief Complaint   Patient presents with   • Follow-up     3 month follow up       SUBJECTIVE:  Maria T Bird is a 56 year old female presenting for follow-up multiple medical issues.  Patient had been lost to follow-up for a little while.  She did have labs back in January in February.  She states today that she is having quite a bit of body pain.  She complains of pain in the joints muscles and skin is she feels a prickly feeling at times and also a burning ache pain.  She has been on some new medications in February with Rheumatology services and she thinks initially they did help however her pain is quite intense at this time.  She does feel as though her sugars are well controlled.  She is also having a lot of difficulty with sleep and she is on multiple sleep agents.  She denies any fevers or chills.  No lightheadedness or dizziness.  No shortness of breath or chest pain.  Does get occasional lower extremity edema to lower legs.  She has been using Tylenol to try to help with her symptoms.  She has had Norco in the past for different types of pains but I did discuss with her would not have long-term use of opioids for generalized body pain.  We did discuss the possibility of other testing as well as possibility of referral to Neurology services and also follow-up with Rheumatology services.    PAST MEDICAL HISTORY:    COPD (chronic obstructive pulmonary disease) (*               Migraines                                                     GERD (gastroesophageal reflux disease)                        RAD (reactive airway disease)                                 Anxiety                                                       Depressive disorder                                           Heart murmur                                                  PONV (postoperative nausea and vomiting)                      Dysphagia                                       05/28/2014      Comment: Past dilation 4/2012    Chronic 
Rehab Medicine
pain                                                    Comment: Back pain    Attention deficit disorder                                    COPD with acute exacerbation (CMS/HCC)          03/10/2017    Moderate persistent asthma with acute exacerba* 08/27/2017    Mycoplasma pneumonia                            06/19/2015    Peptic ulcer                                                  Sinusitis, chronic                                            Failed moderate sedation during procedure                     Essential (primary) hypertension                              Bronchitis                                                    MRSA (methicillin resistant Staphylococcus aur*               Fracture                                                      Diabetes mellitus (CMS/HCC)                                   Family history of esophageal cancer                             Comment: Mother    Diverticulosis                                                Internal hemorrhoids                                          Adenomatous colon polyp                                       History of Nissen fundoplication                              Morbid obesity with BMI of 45.0-49.9, adult (C*               Patient Active Problem List   Diagnosis   • Migraine   • Hay fever   • Chronic low back pain   • GERD (gastroesophageal reflux disease)   • Hyperlipidemia   • Deviated nasal septum   • Hypertrophy of nasal turbinates   • Anxiety   • Dysphagia   • History of tobacco use   • Insomnia   • Adrenal insufficiency (CMS/[facility])   • Hot flashes   • Hypermetropia of both eyes   • Astigmatism of right eye   • Presbyopia OU   • Emotional stress reaction   • Recurrent major depressive disorder, in partial remission (CMS/[facility])   • Family history of brain aneurysm   • Personal history of tobacco use, presenting hazards to health   • Attention deficit hyperactivity disorder (ADHD), combined type   • Controlled substance agreement signed   • 
Adenomatous polyp of transverse colon   • Internal and external hemorrhoids without complication   • Colonic diverticulum   • Family history of esophageal cancer   • At risk for domestic violence   • Moderate persistent asthma, uncomplicated   • Steroid-induced diabetes mellitus (CMS/HCC)   • Morbid obesity with BMI of 45.0-49.9, adult (CMS/HCC)   • Asthma-COPD overlap syndrome (CMS/HCC)   • Lumbosacral spondylosis without myelopathy   • Type 2 diabetes mellitus with hyperglycemia, without long-term current use of insulin (CMS/HCC)       Past Surgical History:   Procedure Laterality Date   • Bilateral salpingoophorectomy      with hysterectomy   • Breast lumpectomy      bilateral   • Breast surgery     • Bronchial brush biopsy  04/11/2006   • Bronchoscopy w fluro bronchial thermoplasty 1 lobe  9/21/2018        • Bronchoscopy w fluro bronchial thermoplasty 1 lobe  10/19/2018        • Bronchoscopy w fluro bronchial thermoplasty 2 or more lobes  11/23/2018        • Bronchoscopy,diagnostic w lavage  9/21/2018        • Bronchoscopy,diagnostic w lavage  11/23/2018        • Cholecystectomy     • Colonoscopy  04/20/2012    DUE 4/2017   • Colonoscopy  02/13/2018    repeat in 10 years--Dr. Wade   • Cyst removal  07/31/2017    (in office removal sebaceous cyst on back) Dr. Shaver   • Egd  09/13/2019    Dr Wade   • Elbow surgery  11/11/2010    Excision of right elbow superficial mass.   • Esophagogastroduodenoscopy  02/13/2018    Dr. Wade   • Foot surgery Left 01/21/2022    plantar fascia release   • Hysterectomy     • Incision and drainage  08/27/2000   • Laparoscopic ace procedure  04/11/2006    Laparoscopic Ace bladder suspension.   • Lung biopsy  09/03/2008   • Nasal septum surgery  10/15/2013    Bilateral Turbinate Reduction, Bilateral Endoscopic Tiffany Bullosa Resection   • Rad diag - arteriography  07/07/2011   • Removal gallbladder     • Upper gastrointestinal endoscopy  06/02/2014   • Vaginal hysterectomy  
01/01/1990    for irregular bleeding       Current Outpatient Medications   Medication Sig Dispense Refill   • HYDROcodone-acetaminophen (NORCO) 5-325 MG per tablet Take 1 tablet by mouth every 8 hours as needed for Pain. 20 tablet 0   • cyclobenzaprine (FLEXERIL) 10 MG tablet Take 1 tablet by mouth 3 times daily as needed for Muscle spasms. 90 tablet 0   • zolpidem (AMBIEN) 10 MG tablet Take a HALF to one tablet by mouth at bedtime as needed for sleep. 30 tablet 0   • insulin glargine (Lantus SoloStar) 100 UNIT/ML pen-injector Inject 20 Units into the skin every morning AND 30 Units nightly. Prime 2 units before each dose. 15 mL 0   • albuterol (ProAir HFA) 108 (90 Base) MCG/ACT inhaler Inhale 2 puffs into the lungs every 4 hours as needed for Shortness of Breath or Wheezing. 36 g 1   • albuterol (VENTOLIN) (2.5 MG/3ML) 0.083% nebulizer solution Take 3 mLs by nebulization every 4 hours as needed for Wheezing. DX: J45.50 375 mL 1   • leflunomide (ARAVA) 20 MG tablet Take 1 tablet by mouth 4 days a week. Monday, Wednesday and friday 16 tablet 1   • hydroxychloroquine (PLAQUENIL) 200 MG tablet Take 1 tablet by mouth 2 times daily. 60 tablet 1   • diclofenac (diclofenac) 1 % gel Apply 2 g topically 4 times daily. Apply to the 1-2 grams to hands and wrists 4 times a day. 350 g 3   • estrogens, conjugated, (Premarin) 0.9 MG tablet Take 1 tablet by mouth daily. 90 tablet 0   • amLODIPine (NORVASC) 10 MG tablet Take 1 tablet by mouth daily. 90 tablet 3   • glipiZIDE (GLUCOTROL ER) 10 MG 24 hr tablet Take 1 tablet by mouth 2 times daily. 180 tablet 3   • roflumilast (Daliresp) 500 MCG Tab Take 1 tablet by mouth daily. 30 tablet 11   • naproxen (NAPROSYN) 375 MG tablet Take 1 tablet by mouth 2 times daily (with meals). 30 tablet 0   • Lancets (accu-chek soft touch) Misc Test blood sugar twice daily. 100 each 3   • blood glucose test strip Use test strips to test twice daily 100 each 3   • loratadine (CLARITIN) 10 MG tablet 
Neurology
Take 1 tablet by mouth daily. 90 tablet 0   • clonazePAM (KlonoPIN) 0.5 MG tablet Take 1 tablet by mouth daily as needed for Anxiety. Use sparingly. 20 tablet 0   • omeprazole (PriLOSEC) 40 MG capsule Take 1 capsule by mouth 2 times daily. 180 capsule 3   • traZODone (DESYREL) 100 MG tablet Take 2 tablets by mouth every evening. 180 tablet 3   • cetirizine (ZyrTEC) 10 MG tablet Take 1 tablet by mouth daily. 90 tablet 1   • lamotrigine (LAMICTAL) 200 MG tablet Take 1 tablet by mouth 2 times daily. 180 tablet 1   • Insulin Pen Needle (Pen Needles) 31G X 8 MM Misc Use nightly with lantus 100 each 3   • tiZANidine (ZANAFLEX) 4 MG tablet Take one or one and 1/2 tablet twice daily as needed for back pain 45 tablet 2   • Cholecalciferol (Vitamin D-3) 125 mcg (5,000 units) tablet Take 1 tablet by mouth daily. 30 tablet 0   • Continuous Blood Gluc  (FreeStyle Karl 14 Day [place]) Device Use 4 times daily. 1 each 12   • Continuous Blood Gluc Sensor (FreeStyle Karl 14 Day Sensor) Misc Use 4 times daily (before meals and nightly). 1 each 12   • losartan (COZAAR) 100 MG tablet Take 1 tablet by mouth daily. 90 tablet 3   • atorvastatin (LIPITOR) 40 MG tablet Take 1 tablet by mouth nightly. 90 tablet 1   • amitriptyline (ELAVIL) 50 MG tablet Take 1 tablet by mouth nightly. At bedtime. 30 tablet 6   • diphenhydrAMINE-zinc acetate (Benadryl Extra Strength) 2-0.1 % cream Apply topically 3 times daily as needed for Itching. 28.4 g 0   • EPINEPHrine (EpiPen 2-Den) 0.3 MG/0.3ML auto-injector Inject 0.3 mLs into the muscle 1 time as needed for Anaphylaxis. 2 each 2   • prochlorperazine (COMPAZINE) 10 MG tablet Take 1 tablet by mouth every 8 hours as needed for Nausea. 40 tablet 1   • sucralfate (Carafate) 1 g tablet Take 1 tablet by mouth 4 times daily. 40 tablet 0   • albuterol-ipratropium 2.5 mg/0.5 mg (DUONEB) 0.5-2.5 (3) MG/3ML nebulizer solution Take 3 mLs by nebulization four times daily. 360 mL 12   • melatonin 5 MG Take 
5 mg by mouth nightly. Indications: Trouble Sleeping     • furosemide (LASIX) 20 MG tablet Take 1 tablet by mouth daily as needed (edema). 30 tablet 2   • Denture Care Products (FIXODENT COMPLETE) Cream Place 1 application onto teeth 2 times daily as needed (denture care).      • acetaminophen (TYLENOL) 500 MG tablet Take 1,000 mg by mouth every 6 hours as needed for Pain or Fever.      • clobetasol (TEMOVATE) 0.05 % cream Apply topically 2 times daily. 30 g 3     No current facility-administered medications for this visit.       ALLERGIES:   Allergen Reactions   • Chantix [Varenicline] Other (See Comments)     Bad dreams   • Grass Other (See Comments)     Allergic Rhinitis     • Imitrex [Sumatriptan Base] HIVES     Hives and bloody nose   • Latex   (Environmental) RASH   • Mold   (Environmental) Cough   • Nicotine RASH     Nicotine patches   • Nucala [Mepolizumab] RASH and PRURITUS   • Seasonal      Allergic Rhinitis     • Tape [Adhesive   (Environmental)] RASH   • Trees Other (See Comments)     Allergic Rhinitis     • Gabapentin GI UPSET       Social History     Tobacco Use   • Smoking status: Former Smoker     Packs/day: 1.00     Years: 30.00     Pack years: 30.00     Types: Cigarettes     Start date: 1980     Quit date: 3/3/2017     Years since quittin.0   • Smokeless tobacco: Never Used   Vaping Use   • Vaping Use: never used   Substance Use Topics   • Alcohol use: Not Currently     Alcohol/week: 0.0 standard drinks   • Drug use: No        Family History   Problem Relation Age of Onset   • Cancer Mother    • Stroke Mother    • Asthma Mother    • Aneurysm Mother         ×3 of brain   • COPD Mother    • Thyroid Mother    • High blood pressure Father    • Asthma Father    • COPD Father    • Asthma Brother    • COPD Brother    • Cancer, Breast Maternal Grandmother    • Cancer Maternal Grandmother    • Substance abuse Maternal Grandmother    • Heart disease Paternal Grandmother    • High blood pressure 
Neurology
Paternal Grandmother    • Aneurysm Paternal Grandmother         Brain   • Heart disease Paternal Grandfather    • High blood pressure Paternal Grandfather    • High blood pressure Brother    • Cancer, Breast Maternal Aunt    • Aneurysm Sister         OBJECTIVE:  Vital Signs:   Visit Vitals  /84   Pulse 78   Temp 97.8 °F (36.6 °C) (Temporal)   Ht 5' 5\" (1.651 m)   Wt 134 kg (295 lb 6.7 oz)   LMP 07/01/2013   SpO2 95%   BMI 49.16 kg/m²     General:  No acute distress noted  HEENT: Conjunctivae are neither pale nor injected.  Pupils equal round reactive eyes track well in all directions.  EOMs intact without nystagmus.  Mucous membranes are moist. Nares are clear. TMs (Tympanic membranes) and canals are normal. Oropharynx is moist without erythema, exudate, or petechiae.  Neck: Supple without anterior cervical or supraclavicular adenopathy.  No thyromegaly.  Lungs: Clear to auscultation bilaterally without crackles or wheezes. Respirations are even and unlabored.  Heart: Regular rate and rhythm without murmurs, rubs, or gallops.  Skin: Warm and dry without rashes.  Extremities: No cyanosis, clubbing, or edema.  Neurologic: Alert and oriented times 3.  Cranial nerves 2-12 intact.    Labs pending    ASSESSMENT AND PLAN:    Maria T was seen today for follow-up.    Diagnoses and all orders for this visit:    Total body pain  -     CREATINE KINASE; Future  -     THYROID STIMULATING HORMONE; Future  -     FREE T3; Future  -     FREE T4; Future  -     CBC WITH DIFFERENTIAL; Future  -     COMPREHENSIVE METABOLIC PANEL; Future    Moderate persistent asthma, uncomplicated    History of tobacco use  -     CT LUNG CANCER SCREENING LOW DOSE WO CONTRAST; Future    Chronic low back pain, unspecified back pain laterality, unspecified whether sciatica present    Chronic obstructive pulmonary disease, unspecified COPD type (CMS/HCC)  -     CT LUNG CANCER SCREENING LOW DOSE WO CONTRAST; Future    Type 2 diabetes mellitus with other 
specified complication, with long-term current use of insulin (CMS/[facility])    Plantar fasciitis, left  -     HYDROcodone-acetaminophen (NORCO) 5-325 MG per tablet; Take 1 tablet by mouth every 8 hours as needed for Pain.       We did discuss the possibility of medication causing some of her symptoms also she does have other comorbidities including diabetes and COPD.  She is to continue medications as directed at this time we will do a very short amount of acetaminophen hydrocodone.  I did discuss with her the possibility of Cymbalta or Lyrica to try to help with pain control but we will await laboratory testing.  I had also talked about gabapentin but she had significant GI upset with that medication in the past.  Again we will call her results and have her follow up accordingly.

## 2025-01-18 NOTE — PROGRESS NOTE ADULT - PROBLEM SELECTOR PLAN 7
Previously prescribed Synthroid 25mcg qd filled 12/12/25 per the  shop   - TSH 1.18; T4 4.52  - c/w home dose of levothyroxine 25mcg

## 2025-01-18 NOTE — PROGRESS NOTE ADULT - SUBJECTIVE AND OBJECTIVE BOX
URSULA ACKERMAN  69y  Male      Patient is a 69y old  Male who presents with a chief complaint of fever, cough, SOB (17 Jan 2025 08:59)        Notable Events:      REVIEW OF SYSTEMS:  CONSTITUTIONAL: No fever  EYES: No visual disturbances  ENMT: No hearing changes, No sore throat  RESPIRATORY: No cough, No shortness of breath  CARDIOVASCULAR: No chest pain, No palpitations  GASTROINTESTINAL: No abdominal pain, No nausea, No vomiting, No diarrhea, No melena, No hematochezia.  GENITOURINARY: No dysuria, frequency, hematuria, or incontinence  NEUROLOGICAL: No headaches, No weakness, No numbness, No tremors  SKIN: No lesions, No rashes  MUSCULOSKELETAL: No joint pain, No backpain, No extremity pain  PSYCHIATRIC: No depression, anxiety, or difficulty sleeping  FAMILY HISTORY:    Vital Signs Last 24 Hrs  T(C): 36.3 (18 Jan 2025 06:00), Max: 36.7 (17 Jan 2025 09:15)  T(F): 97.3 (18 Jan 2025 06:00), Max: 98 (17 Jan 2025 09:15)  HR: 58 (18 Jan 2025 04:16) (50 - 64)  BP: 126/87 (18 Jan 2025 04:16) (83/62 - 149/99)  BP(mean): 101 (18 Jan 2025 04:16) (69 - 118)  RR: 17 (18 Jan 2025 04:16) (16 - 18)  SpO2: 96% (18 Jan 2025 04:16) (96% - 100%)    Parameters below as of 18 Jan 2025 04:16  Patient On (Oxygen Delivery Method): room air      No Known Allergies      PHYSICAL EXAM:  GENERAL: No acute distress  HEAD:  Atraumatic, Normocephalic  EYES: Normal extraocular movements. Conjunctiva and sclera are normal  NECK: Supple. Normal thyroid. No lymphadenopathy  NERVOUS SYSTEM:  Alert & Oriented X3. Motor Strength 5/5 B/L upper and lower extremities; DTRs 2+ intact and symmetric.  CHEST/LUNG: No wheezing or crackles present.  CARDIAC: Regular rate and rhythm. No murmurs or rubs. Jugular venous pressure is normal.  ABDOMEN: Nontender. Nondistended. Soft abdomen.  EXTREMITIES:  2+ Peripheral Pulses, No clubbing, cyanosis, or edema.  SKIN: No rashes or lesions    Consultant(s) Notes Reviewed:  [x ] YES  [ ] NO  Care Discussed with Consultants/Other Providers [ x] YES  [ ] NO    LABS:                        10.5   7.81  )-----------( 246      ( 18 Jan 2025 05:30 )             30.9     01-18    138  |  102  |  17  ----------------------------<  101[H]  4.0   |  25  |  1.09    Ca    8.4      18 Jan 2025 05:30  Phos  2.7     01-18  Mg     1.9     01-18    TPro  5.3[L]  /  Alb  2.9[L]  /  TBili  0.3  /  DBili  x   /  AST  41[H]  /  ALT  38  /  AlkPhos  62  01-16          Urinalysis Basic - ( 18 Jan 2025 05:30 )    Color: x / Appearance: x / SG: x / pH: x  Gluc: 101 mg/dL / Ketone: x  / Bili: x / Urobili: x   Blood: x / Protein: x / Nitrite: x   Leuk Esterase: x / RBC: x / WBC x   Sq Epi: x / Non Sq Epi: x / Bacteria: x          RADIOLOGY & ADDITIONAL TESTS:    Imaging Personally Reviewed:  [x] YES  [ ] NO  albuterol/ipratropium for Nebulization 3 milliLiter(s) Nebulizer every 6 hours  atorvastatin 20 milliGRAM(s) Oral at bedtime  buPROPion XL (24-Hour) . 300 milliGRAM(s) Oral daily  cholecalciferol 1000 Unit(s) Oral daily  clonazePAM  Tablet 0.5 milliGRAM(s) Oral at bedtime  dextrose 5%. 1000 milliLiter(s) IV Continuous <Continuous>  dextrose 5%. 1000 milliLiter(s) IV Continuous <Continuous>  dextrose 50% Injectable 25 Gram(s) IV Push once  dextrose 50% Injectable 25 Gram(s) IV Push once  dextrose 50% Injectable 12.5 Gram(s) IV Push once  dextrose Oral Gel 15 Gram(s) Oral once PRN  enoxaparin Injectable 40 milliGRAM(s) SubCutaneous every 24 hours  folic acid 1 milliGRAM(s) Oral daily  glucagon  Injectable 1 milliGRAM(s) IntraMuscular once  influenza  Vaccine (HIGH DOSE) 0.5 milliLiter(s) IntraMuscular once  insulin lispro (ADMELOG) corrective regimen sliding scale   SubCutaneous Before meals and at bedtime  levothyroxine 25 MICROGram(s) Oral daily  meclizine 25 milliGRAM(s) Oral every 12 hours PRN  midodrine 10 milliGRAM(s) Oral every 8 hours  multivitamin  Chewable 1 Tablet(s) Oral daily  polyethylene glycol 3350 17 Gram(s) Oral daily  risperiDONE   Tablet 0.5 milliGRAM(s) Oral at bedtime  senna 2 Tablet(s) Oral at bedtime  thiamine 100 milliGRAM(s) Oral daily      HEALTH ISSUES - PROBLEM Dx:         URSULA ACKERMAN  69y  Male      Patient is a 69y old  Male who presents with a chief complaint of fever, cough, SOB (17 Jan 2025 08:59)    ED Course:   Mr. Ackerman presented to [facility] ED on 1/12 BIBEMS w c/o generalized weakness x the AM, shortness of breath, cough and tremors. SPO2 by EMS reportedly 90% on RA so he was placed on 2LPM NC. While in ED vital signs initially 100.1f (rectal), , BP 85/53, RR 21, SPO2 92% on 2 LPM with labs s/f BUN 25, Cr 2.234, , ALT 89, initial lactate of 4.6, troponin of 89, proBNP 3520 and RVP +influenza A. CTA of chest negative for PE but with b/l lower lobe mucus plugging. He was given CTX/Azithro, tamiflu, Duonebs, 3L LR, Mg 2g and tylenol. His lactate cleared but he remained hypotensive despite fluids so was started on low-dose norepinephrine and transferred to MICU at [facility].    HOSPITAL COURSE:  69M with PMHx of AUD, ?schizoaffective disorder, CAD, HTN, ?CKD, and DM who initially presented to [facility] ED BIBEMS from shelter for SOB, cough, fever and malaise. Labs significant for lactic acidosis, pre-renal SHIRA, and transaminitis c/w septic shock, pt was hypotensive to SBP 80s and MAP 60 s/p 3L LR after which pt was started on Levo, given CTX x1, Azithro x1, was started on Tamiflu and admitted to MICU. Since admission to MICU pt has been continue on Tamiflu and monitored off Abx w/ BCx NGTD, UA neg, UStrep neg, ULeg neg, MRSA neg. POCUS was also without gross consolidation or inflitrate. TTE 1/13 unremarkable. Pt was started on Midodrine 5mg q8 and subsequently weaned off pressors as of 5am 1/14, after which pt received 1L LR and Midodrine was uptitrated to 20mg q8hrs for SBP 80s, MAP 60 on 1/15. Received collateral from outpatient provider that patient baseline BP is hypertensive if anything, not hypotensive. Hospital course c/b pt reporting dizziness w/ changes in position for which Neuro was consulted, pt was started on fludrocortisone, high dose thiamine, underwent head CT showing chronic microvascular white matter changes, and pt is now pending EEG to assess for cerebral dysfunction/slowing and MRI head/neck. His MRI was notable for an acute/subacute stroke in the posterior circulation which was originally ordered to identify his dizziness.    REVIEW OF SYSTEMS:  CONSTITUTIONAL: No fever  EYES: No visual disturbances  ENMT: No hearing changes, No sore throat  RESPIRATORY: No cough, No shortness of breath  CARDIOVASCULAR: No chest pain, No palpitations  GASTROINTESTINAL: No abdominal pain, No nausea, No vomiting, No diarrhea, No melena, No hematochezia.  GENITOURINARY: No dysuria, frequency, hematuria, or incontinence  NEUROLOGICAL: No headaches, No weakness, No numbness, No tremors  SKIN: No lesions, No rashes  MUSCULOSKELETAL: No joint pain, No backpain, No extremity pain  PSYCHIATRIC: No depression, anxiety, or difficulty sleeping  FAMILY HISTORY:    Vital Signs Last 24 Hrs  T(C): 36.3 (18 Jan 2025 06:00), Max: 36.7 (17 Jan 2025 09:15)  T(F): 97.3 (18 Jan 2025 06:00), Max: 98 (17 Jan 2025 09:15)  HR: 58 (18 Jan 2025 04:16) (50 - 64)  BP: 126/87 (18 Jan 2025 04:16) (83/62 - 149/99)  BP(mean): 101 (18 Jan 2025 04:16) (69 - 118)  RR: 17 (18 Jan 2025 04:16) (16 - 18)  SpO2: 96% (18 Jan 2025 04:16) (96% - 100%)    Parameters below as of 18 Jan 2025 04:16  Patient On (Oxygen Delivery Method): room air      No Known Allergies      PHYSICAL EXAM:  GENERAL: No acute distress  HEAD:  Atraumatic, Normocephalic  EYES: Normal extraocular movements. Conjunctiva and sclera are normal  NECK: Supple. Normal thyroid. No lymphadenopathy  NERVOUS SYSTEM:  Alert & Oriented X3. Motor Strength 5/5 B/L upper and lower extremities; DTRs 2+ intact and symmetric.  CHEST/LUNG: No wheezing or crackles present.  CARDIAC: Regular rate and rhythm. No murmurs or rubs. Jugular venous pressure is normal.  ABDOMEN: Nontender. Nondistended. Soft abdomen.  EXTREMITIES:  2+ Peripheral Pulses, No clubbing, cyanosis, or edema.  SKIN: No rashes or lesions    Consultant(s) Notes Reviewed:  [x ] YES  [ ] NO  Care Discussed with Consultants/Other Providers [ x] YES  [ ] NO    LABS:                        10.5   7.81  )-----------( 246      ( 18 Jan 2025 05:30 )             30.9     01-18    138  |  102  |  17  ----------------------------<  101[H]  4.0   |  25  |  1.09    Ca    8.4      18 Jan 2025 05:30  Phos  2.7     01-18  Mg     1.9     01-18    TPro  5.3[L]  /  Alb  2.9[L]  /  TBili  0.3  /  DBili  x   /  AST  41[H]  /  ALT  38  /  AlkPhos  62  01-16          Urinalysis Basic - ( 18 Jan 2025 05:30 )    Color: x / Appearance: x / SG: x / pH: x  Gluc: 101 mg/dL / Ketone: x  / Bili: x / Urobili: x   Blood: x / Protein: x / Nitrite: x   Leuk Esterase: x / RBC: x / WBC x   Sq Epi: x / Non Sq Epi: x / Bacteria: x          RADIOLOGY & ADDITIONAL TESTS:    Imaging Personally Reviewed:  [x] YES  [ ] NO  albuterol/ipratropium for Nebulization 3 milliLiter(s) Nebulizer every 6 hours  atorvastatin 20 milliGRAM(s) Oral at bedtime  buPROPion XL (24-Hour) . 300 milliGRAM(s) Oral daily  cholecalciferol 1000 Unit(s) Oral daily  clonazePAM  Tablet 0.5 milliGRAM(s) Oral at bedtime  dextrose 5%. 1000 milliLiter(s) IV Continuous <Continuous>  dextrose 5%. 1000 milliLiter(s) IV Continuous <Continuous>  dextrose 50% Injectable 25 Gram(s) IV Push once  dextrose 50% Injectable 25 Gram(s) IV Push once  dextrose 50% Injectable 12.5 Gram(s) IV Push once  dextrose Oral Gel 15 Gram(s) Oral once PRN  enoxaparin Injectable 40 milliGRAM(s) SubCutaneous every 24 hours  folic acid 1 milliGRAM(s) Oral daily  glucagon  Injectable 1 milliGRAM(s) IntraMuscular once  influenza  Vaccine (HIGH DOSE) 0.5 milliLiter(s) IntraMuscular once  insulin lispro (ADMELOG) corrective regimen sliding scale   SubCutaneous Before meals and at bedtime  levothyroxine 25 MICROGram(s) Oral daily  meclizine 25 milliGRAM(s) Oral every 12 hours PRN  midodrine 10 milliGRAM(s) Oral every 8 hours  multivitamin  Chewable 1 Tablet(s) Oral daily  polyethylene glycol 3350 17 Gram(s) Oral daily  risperiDONE   Tablet 0.5 milliGRAM(s) Oral at bedtime  senna 2 Tablet(s) Oral at bedtime  thiamine 100 milliGRAM(s) Oral daily      HEALTH ISSUES - PROBLEM Dx:

## 2025-01-18 NOTE — PROGRESS NOTE ADULT - PROBLEM SELECTOR PLAN 5
, ALT 89. Ddx hypoperfusion iso septic shock vs alcohol hepatitis  - LFTs continue to downtrend  - Hepatitis panel negative   - RUQ not indicated no RUQ tenderness and LFTs almost normal

## 2025-01-18 NOTE — PROGRESS NOTE ADULT - PROBLEM SELECTOR PLAN 11
F: s/p 500mL LR  E: Replete K>4 Mg>2 Phos>3  D: Regular with Ensure High Protein BID  GI PPx: None  VTE PPx: Lovenox   Code status: Full  Dispo: [facility]

## 2025-01-18 NOTE — PROGRESS NOTE ADULT - PROBLEM SELECTOR PLAN 6
Mild Hyponatremia(132) possibly 2/2 SIADH 2/2 influenza; appears euvolemic on exam  - Daily BMP  - If Na continues to downtrend will consider fluid restrictions

## 2025-01-18 NOTE — PROGRESS NOTE ADULT - PROBLEM SELECTOR PLAN 9
H/o Alcohol use disorder   ~2 drinks per week, Last drink 1/10. Per chart review, was empirically treated for Wernicke’s encephalopathy in 2022 at [place]. Discussed with mary's PCP about AUD and she states he is not forthcoming about his alcohol intake and quantity and frequency is not clear.   - IV thiamine 500mg qd x3d then 100mg qd  - c/w folic acid 1mg qd, multivitamin 1 tablet qd  - utox unable to be run x 2 due to acidic urine  - CIWA protocol will consider phenobarb protocol if CIWA > 8

## 2025-01-18 NOTE — PROGRESS NOTE ADULT - SUBJECTIVE AND OBJECTIVE BOX
URSULA ACKERMAN  69y  Male      Patient is a 69y old  Male who presents with a chief complaint of fever, cough, SOB (18 Jan 2025 08:26)        Notable Events:      REVIEW OF SYSTEMS:  CONSTITUTIONAL: No fever  EYES: No visual disturbances  ENMT: No hearing changes, No sore throat  RESPIRATORY: No cough, No shortness of breath  CARDIOVASCULAR: No chest pain, No palpitations  GASTROINTESTINAL: No abdominal pain, No nausea, No vomiting, No diarrhea, No melena, No hematochezia.  GENITOURINARY: No dysuria, frequency, hematuria, or incontinence  NEUROLOGICAL: No headaches, No weakness, No numbness, No tremors  SKIN: No lesions, No rashes  MUSCULOSKELETAL: No joint pain, No backpain, No extremity pain  PSYCHIATRIC: No depression, anxiety, or difficulty sleeping  FAMILY HISTORY:    Vital Signs Last 24 Hrs  T(C): 36.3 (18 Jan 2025 06:00), Max: 36.7 (17 Jan 2025 09:15)  T(F): 97.3 (18 Jan 2025 06:00), Max: 98 (17 Jan 2025 09:15)  HR: 58 (18 Jan 2025 04:16) (50 - 64)  BP: 126/87 (18 Jan 2025 04:16) (83/62 - 149/99)  BP(mean): 101 (18 Jan 2025 04:16) (69 - 118)  RR: 17 (18 Jan 2025 04:16) (16 - 18)  SpO2: 96% (18 Jan 2025 04:16) (96% - 100%)    Parameters below as of 18 Jan 2025 04:16  Patient On (Oxygen Delivery Method): room air      No Known Allergies      PHYSICAL EXAM:  GENERAL: No acute distress  HEAD:  Atraumatic, Normocephalic  EYES: Normal extraocular movements. Conjunctiva and sclera are normal  NECK: Supple. Normal thyroid. No lymphadenopathy  NERVOUS SYSTEM:  Alert & Oriented X3. Motor Strength 5/5 B/L upper and lower extremities; DTRs 2+ intact and symmetric.  CHEST/LUNG: No wheezing or crackles present.  CARDIAC: Regular rate and rhythm. No murmurs or rubs. Jugular venous pressure is normal.  ABDOMEN: Nontender. Nondistended. Soft abdomen.  EXTREMITIES:  2+ Peripheral Pulses, No clubbing, cyanosis, or edema.  SKIN: No rashes or lesions    Consultant(s) Notes Reviewed:  [x ] YES  [ ] NO  Care Discussed with Consultants/Other Providers [ x] YES  [ ] NO    LABS:                        10.5   7.81  )-----------( 246      ( 18 Jan 2025 05:30 )             30.9     01-18    138  |  102  |  17  ----------------------------<  101[H]  4.0   |  25  |  1.09    Ca    8.4      18 Jan 2025 05:30  Phos  2.7     01-18  Mg     1.9     01-18    TPro  5.3[L]  /  Alb  2.9[L]  /  TBili  0.3  /  DBili  x   /  AST  41[H]  /  ALT  38  /  AlkPhos  62  01-16          Urinalysis Basic - ( 18 Jan 2025 05:30 )    Color: x / Appearance: x / SG: x / pH: x  Gluc: 101 mg/dL / Ketone: x  / Bili: x / Urobili: x   Blood: x / Protein: x / Nitrite: x   Leuk Esterase: x / RBC: x / WBC x   Sq Epi: x / Non Sq Epi: x / Bacteria: x          RADIOLOGY & ADDITIONAL TESTS:    Imaging Personally Reviewed:  [x] YES  [ ] NO  albuterol/ipratropium for Nebulization 3 milliLiter(s) Nebulizer every 6 hours  atorvastatin 20 milliGRAM(s) Oral at bedtime  buPROPion XL (24-Hour) . 300 milliGRAM(s) Oral daily  cholecalciferol 1000 Unit(s) Oral daily  clonazePAM  Tablet 0.5 milliGRAM(s) Oral at bedtime  dextrose 5%. 1000 milliLiter(s) IV Continuous <Continuous>  dextrose 5%. 1000 milliLiter(s) IV Continuous <Continuous>  dextrose 50% Injectable 25 Gram(s) IV Push once  dextrose 50% Injectable 25 Gram(s) IV Push once  dextrose 50% Injectable 12.5 Gram(s) IV Push once  dextrose Oral Gel 15 Gram(s) Oral once PRN  enoxaparin Injectable 40 milliGRAM(s) SubCutaneous every 24 hours  folic acid 1 milliGRAM(s) Oral daily  glucagon  Injectable 1 milliGRAM(s) IntraMuscular once  influenza  Vaccine (HIGH DOSE) 0.5 milliLiter(s) IntraMuscular once  insulin lispro (ADMELOG) corrective regimen sliding scale   SubCutaneous Before meals and at bedtime  levothyroxine 25 MICROGram(s) Oral daily  meclizine 25 milliGRAM(s) Oral every 12 hours PRN  midodrine 10 milliGRAM(s) Oral every 8 hours  multivitamin  Chewable 1 Tablet(s) Oral daily  polyethylene glycol 3350 17 Gram(s) Oral daily  risperiDONE   Tablet 0.5 milliGRAM(s) Oral at bedtime  senna 2 Tablet(s) Oral at bedtime  thiamine 100 milliGRAM(s) Oral daily      HEALTH ISSUES - PROBLEM Dx:

## 2025-01-18 NOTE — PROGRESS NOTE ADULT - SUBJECTIVE AND OBJECTIVE BOX
*****ACCEPTANCE NOTE FROM 7LA TO F*****    HOSPITAL COURSE:  69M with PMHx of AUD, ?schizoaffective disorder, CAD, HTN, ?CKD, and DM who [facility], cough, fever and malaise. Labs significant for lactic acidosis, pre-renal SHIRA, and transaminitis c/w septic shock, pt was hypotensive to SBP 80s and MAP 60 s/p 3L LR after which pt was started on Levo, given CTX x1, Azithro x1, was started on Tamiflu and admitted to MICU. Since admission to MICU pt has been continue on Tamiflu and monitored off Abx w/ BCx NGTD, UA neg, UStrep neg, ULeg neg, MRSA neg. POCUS was also without gross consolidation or inflitrate. TTE 1/13 unremarkable. Pt was started on Midodrine 5mg q8 and subsequently weaned off pressors as of 5am 1/14, after which pt received 1L LR and Midodrine was uptitrated to 20mg q8hrs for SBP 80s, MAP 60 on 1/15. Received collateral from outpatient provider that patient baseline BP is hypertensive if anything, not hypotensive. Hospital course c/b pt reporting dizziness w/ changes in position for which Neuro was consulted, pt was started on fludrocortisone, high dose thiamine, underwent head CT showing chronic microvascular white matter changes, and pt is now pending EEG to assess for cerebral dysfunction/slowing and MRI head/neck. His MRI was notable for an acute/subacute stroke in the posterior circulation which was originally ordered to identify his dizziness.    SUBJECTIVE: Patient seen and examined at bedside, resting comfortably in bed, and does not appear to be in any acute distress. Patient reports    Vital Signs Last 24 Hrs  T(C): 36.1 (18 Jan 2025 11:15), Max: 36.5 (17 Jan 2025 21:30)  T(F): 97 (18 Jan 2025 11:15), Max: 97.7 (17 Jan 2025 21:30)  HR: 64 (18 Jan 2025 13:45) (50 - 64)  BP: 108/62 (18 Jan 2025 13:45) (108/62 - 149/99)  BP(mean): 80 (18 Jan 2025 13:45) (80 - 118)  RR: 16 (18 Jan 2025 13:45) (16 - 18)  SpO2: 97% (18 Jan 2025 13:45) (96% - 100%)    Parameters below as of 18 Jan 2025 13:45  Patient On (Oxygen Delivery Method): room air        PHYSICAL EXAM:  GENERAL: No acute distress  HEAD:  Atraumatic, Normocephalic  EYES: Normal extraocular movements. Conjunctiva and sclera are normal  NECK: Supple. Normal thyroid. No lymphadenopathy  NERVOUS SYSTEM:  Alert & Oriented X3. Motor Strength 5/5 B/L upper and lower extremities; DTRs 2+ intact and symmetric.  CHEST/LUNG: No wheezing or crackles present.  CARDIAC: Regular rate and rhythm. No murmurs or rubs. Jugular venous pressure is normal.  ABDOMEN: Nontender. Nondistended. Soft abdomen.  EXTREMITIES:  2+ Peripheral Pulses, No clubbing, cyanosis, or edema.  SKIN: No rashes or lesions    LABS:                        10.5   7.81  )-----------( 246      ( 18 Jan 2025 05:30 )             30.9     01-18    138  |  102  |  17  ----------------------------<  101[H]  4.0   |  25  |  1.09    Ca    8.4      18 Jan 2025 05:30  Phos  2.7     01-18  Mg     1.9     01-18

## 2025-01-18 NOTE — PROGRESS NOTE ADULT - ATTENDING COMMENTS
69M with PMHx of AUD, ?schizoaffective disorder, CAD, HTN, ?CKD, and DM admitted to MICU for AHRF anf presumed septic shock, infectious workup positive for influenza only. BP improved s/p midodrine, florinef. Neuro consulted for dizziness, work up completed including negative EEG, MRI +subacute CVA. Remains HDS, now step down to [facility] for further management.   Feels well in nad. no symptoms currently. on PE: AO x2-3, no dysmetria, no fnd. MMM, s1s2 no murmur, b/l rhonchi bibasilar. no LE duane a    Plan   -discussed w/ stroke team, c/f subacute vs chronic ischemic stroke.. Start asa/statin.    -fall precautions    -PT recs    -monitor BP   -wean midodrine as tolerated. 15mg->10 today. Can transition to 5mg TID tomm

## 2025-01-18 NOTE — PROGRESS NOTE ADULT - PROBLEM SELECTOR PLAN 11
F: s/p 500mL LR  E: Replete K>4 Mg>2 Phos>3  D: Regular with Ensure High Protein BID  GI PPx: None  VTE PPx: Lovenox   Code status: Full  Dispo: [facility]

## 2025-01-18 NOTE — PROGRESS NOTE ADULT - PROBLEM SELECTOR PLAN 3
SHIRA likely prerenal 2/2 hypovolemia (Resolved)  Cr 1.3-1.4 in 2022 and per PCP 1.27 in August 2024. P/w Cr 2.24, suspect pre-renal iso septic shock and decreased po intake. UA spec grav elevated 1.046, CK 1026, FeNa 0.9%   - BMP daily   - Strict I/O

## 2025-01-18 NOTE — PROGRESS NOTE ADULT - ASSESSMENT
69M PMH (per chart review) EtOH use disorder, depression, former smoker (20 pack year quit 15 years ago), HTN, hypothyroid, CAD, diabetes who was [facility] for SOB, cough, fevers, and malaise. Transferred to MICU for septic shock 2/2 influenza now off pressors and weaned to nasal cannula.

## 2025-01-18 NOTE — PROGRESS NOTE ADULT - PROBLEM SELECTOR PLAN 1
#Acute hypoxic respiratory failure  #Influenza PNA  CTA: negative for PE but showed bilateral lower lobe mucus plugging as well as bronchial wall thickening.   s/p 3L fluids in [facility] ED, remained hypotensive sBP 80s, MAP low 60. TSH wnl. On Lisinopril/HCTZ outpatient  - Weaned off levophed  - Midodrine increased to 10mg TID 1/13 PM --> increased to 20mg TID 1/15 PM --> weaned down on 1/17 to 15mg TID  - started fludrocortisone 0.1mg qd 1/14 PM --> discontinued 1/16  - Watch HR closely with initiation of midodrine given h/o bradycardia  - Lipid panel (Total: 85 HDL: 31 LDL: 36)   - c/w atorvastatin 20mg at bedtime  - Maintain MAP>65   - s/p 500mL LR bolus this morning   - Hold BP meds  - Weaned to room air as tolerated, Maintain SpO2 goal >92%   - Duoneb q4h standing  - chest PT BID, incentive spirometry, aerobika

## 2025-01-18 NOTE — CONSULT NOTE ADULT - ASSESSMENT
69M PMH (per chart review) EtOH use disorder, HTN, ?hypothyroidism, ?schizoaffective disorder, CAD, ?CKD, diabetes who was BIBEMS to [facility] from shelter for SOB, cough, fevers, and malaise admitted to MICU for septic shock 2/2 influenza. Neurology consulted for dizziness. He says he started with dizziness, described as disequilibrium (rocking on a ship), 2 weeks ago (however per medicine team may have started months ago as they called his PCP) with associated hearing loss and tinnitus (buzzing). He says his symptoms are intermittent, feels asymptomatic when lying down but has the sensation when he stands up and tries to walk. No visual symptoms. He thinks the symptoms started gradually but has trouble pinpointing an exact time at onset. No other alleviating or exacerbating symptoms. Pt was started on fludrocortisone, high dose thiamine, underwent head CT showing chronic microvascular white matter changes, and pt is now pending EEG to assess for cerebral dysfunction/slowing and MRI head/neck. His MRI was notable for an acute/subacute stroke in the posterior circulation which was originally ordered to identify his dizziness. MRA unremarkable. TTE normal. Stroke consulted for management of ischemic stroke.    Impression: Small foci of restricted diffusion in the right parietal lobe and right occipital lobe and left parietal lobe possible related to subacute infarcts         69M PMH (per chart review) EtOH use disorder, HTN, ?hypothyroidism, ?schizoaffective disorder, CAD, ?CKD, diabetes who was BIBEMS to [facility] from shelter for SOB, cough, fevers, and malaise admitted to MICU for septic shock 2/2 influenza. Neurology consulted for dizziness. He says he started with dizziness, described as disequilibrium (rocking on a ship), 2 weeks ago (however per medicine team may have started months ago as they called his PCP) with associated hearing loss and tinnitus (buzzing). He says his symptoms are intermittent, feels asymptomatic when lying down but has the sensation when he stands up and tries to walk. No visual symptoms. He thinks the symptoms started gradually but has trouble pinpointing an exact time at onset. No other alleviating or exacerbating symptoms. Pt was started on fludrocortisone, high dose thiamine, underwent head CT showing chronic microvascular white matter changes, and pt is now pending EEG to assess for cerebral dysfunction/slowing and MRI head/neck. His MRI was notable for an acute/subacute stroke in the posterior circulation which was originally ordered to identify his dizziness. MRA unremarkable. TTE normal. Stroke consulted for management of ischemic stroke.    Impression: Small foci of restricted diffusion in the right parietal lobe and right occipital lobe and left parietal lobe possible related to subacute/chronic small vessel disease cerebral infarctions    1)Secondary stroke prevention  - start ASA 81mg PO daily  - continue Atorvastatin 20mg PO daily    2) Stroke risk factors  - TTE unremarkable  - A1C: 6.4  - LDL: 36  - HTN    3) Further management  - may need outpt neurology follow up  - provide stroke education    DVT prophylaxis   -Lovenox SQ    Discussed with Neurology Attending Dr. Rivera

## 2025-01-18 NOTE — PROGRESS NOTE ADULT - PROBLEM SELECTOR PLAN 9
H/o Alcohol use disorder   ~2 drinks per week, Last drink 1/10. Per chart review, was empirically treated for Wernicke’s encephalopathy in 2022 at [place]. Discussed with mary's PCP about AUD and she states he is not forthcoming about his alcohol intake and quantity and frequency is not clear.   - IV thiamine 500mg qd x3d then 100mg qd  - c/w folic acid 1mg qd, multivitamin 1 tablet qd  - utox unable to be run x 2 due to acidic urine  - CIWA protocol will consider phenobarb protocol if CIWA > 8

## 2025-01-18 NOTE — PROGRESS NOTE ADULT - PROBLEM SELECTOR PLAN 8
#MDD  #h/o schizoaffective disorder  Psychiatrist Dr. Corrales. Per chart review, has previously been prescribed Wellbutrin, risperidone, clonazepam, gabapentin; reports only taking Wellbutrin.   Home med: Wellbutrin SR 200mg BID, iSTOP with clonazepam filled monthly last filled 12/18 and per discussion today with patient's PCP he has most recently been rx'd risperidone, clonazepam, Wellbutrin (per the  shop last filled on 12/19/24)  - c/w buproprion 300mg qd (therapeutic interchange for Wellbutrin 150mg BID)   - c/w risperidone 0.5mg HS  - c/w clonazepam 0.5mg HS

## 2025-01-18 NOTE — PROGRESS NOTE ADULT - PROBLEM SELECTOR PLAN 4
#Chronic Dizziness and unsteady gait  Patient c/o dizziness x 2 weeks. Per patient's PCP he has been chronically been c/o dizziness x months and has been having gait difficulties  - Head CT w Moderate severity chronic white matter microvascular type changes.  - Neuro consulted, f/u further recs  - vEEG read from 1/16: no evidence of seizures  - f/u MR Head w/wo IV contrast  - f/u MRA Neck w/wo IV contrast  - c/w meclizine 25mg q12h PRN   - c/w thiamine 100mg qd  - consider PT/OT consult

## 2025-01-18 NOTE — PROGRESS NOTE ADULT - ATTENDING COMMENTS
Influenza/sepsis with septic shock, persistently vasoplegic and slow to wean off midodrine but overall improving. Exam as above. Labs stable. PT recommending TARYN. MRI completed for work up of dizziness per neuro recommendations. Awaiting final read. Mild reflex bradycardia should improve with weaning. Plan to transfer to [facility] with continued weaning of midodrine.   . Influenza/sepsis with septic shock, persistently vasoplegic and slow to wean off midodrine but overall improving. Exam as above. Labs stable. PT recommending TARYN. MRI completed for work up of dizziness per neuro recommendations. Awaiting final read. Mild reflex bradycardia should improve with weaning. Plan to transfer to [facility] with continued weaning of midodrine.

## 2025-01-18 NOTE — PROGRESS NOTE ADULT - SUBJECTIVE AND OBJECTIVE BOX
*****ACCEPTANCE NOTE FROM 7LA TO F*****    HOSPITAL COURSE:  69M with PMHx of AUD, ?schizoaffective disorder, CAD, HTN, ?CKD, and DM who initially [facility] from shelter for SOB, cough, fever and malaise. Labs significant for lactic acidosis, pre-renal SHIRA, and transaminitis c/w septic shock, pt was hypotensive to SBP 80s and MAP 60 s/p 3L LR after which pt was started on Levo, given CTX x1, Azithro x1, was started on Tamiflu and admitted to MICU. Since admission to MICU pt has been continue on Tamiflu and monitored off Abx w/ BCx NGTD, UA neg, UStrep neg, ULeg neg, MRSA neg. POCUS was also without gross consolidation or inflitrate. TTE 1/13 unremarkable. Pt was started on Midodrine 5mg q8 and subsequently weaned off pressors as of 5am 1/14, after which pt received 1L LR and Midodrine was uptitrated to 20mg q8hrs for SBP 80s, MAP 60 on 1/15. Received collateral from outpatient provider that patient baseline BP is hypertensive if anything, not hypotensive. Hospital course c/b pt reporting dizziness w/ changes in position for which Neuro was consulted, pt was started on fludrocortisone, high dose thiamine, underwent head CT showing chronic microvascular white matter changes, and pt is now pending EEG to assess for cerebral dysfunction/slowing and MRI head/neck. His MRI was notable for an acute/subacute stroke in the posterior circulation which was originally ordered to identify his dizziness.    SUBJECTIVE: Patient seen and examined at bedside, resting comfortably in bed, and does not appear to be in any acute distress. Patient reports    Vital Signs Last 24 Hrs  T(C): 36.6 (18 Jan 2025 17:16), Max: 36.6 (18 Jan 2025 17:16)  T(F): 97.8 (18 Jan 2025 17:16), Max: 97.8 (18 Jan 2025 17:16)  HR: 64 (18 Jan 2025 13:45) (50 - 64)  BP: 108/62 (18 Jan 2025 13:45) (108/62 - 149/99)  BP(mean): 80 (18 Jan 2025 13:45) (80 - 118)  RR: 16 (18 Jan 2025 13:45) (16 - 18)  SpO2: 97% (18 Jan 2025 13:45) (96% - 100%)    Parameters below as of 18 Jan 2025 13:45  Patient On (Oxygen Delivery Method): room air        PHYSICAL EXAM:  General: in no acute distress  Eyes: EOMI intact bilaterally. Anicteric sclerae, moist conjunctivae  HENT: Moist mucous membranes  Neck: Trachea midline, supple  Lungs: CTA B/L. No wheezes, rales, or rhonchi  Cardiovascular: RRR. No murmurs, rubs, or gallops  Abdomen: Soft, non-tender non-distended; No rebound or guarding  Extremities: WWP, No clubbing, cyanosis or edema  Neurological: Alert and oriented  Skin: Warm and dry. No obvious rash     LABS:                        10.5   7.81  )-----------( 246      ( 18 Jan 2025 05:30 )             30.9     01-18    138  |  102  |  17  ----------------------------<  101[H]  4.0   |  25  |  1.09    Ca    8.4      18 Jan 2025 05:30  Phos  2.7     01-18  Mg     1.9     01-18

## 2025-01-18 NOTE — PROGRESS NOTE ADULT - PROBLEM SELECTOR PLAN 2
Patient met sepsis criteria on admission (HR >90, RR >20). RVP +influenza A. UA neg for UTI. Lactate elevated to 4.6. Hypotension refractory to 3L fluids.    Lactate cleared s/p 3L fluids. s/p CTX 1g and azithromycin in ED. Procal (after receiving abx) 1.05.  - s/p CTX 1g/Azithro 500mg x 1 (1/12)  - c/w Tamiflu 30mg q24h x5d (1/12 - 1/16) - dose adjusted for CrCl increased to 30mg BID 1/13  - Will keep off abx for now if persistently febrile or if worsening shock will start CTX 2g/Azithro 500mg for CAP coverage  - BCx no growth at 72h  - Urine legionella negative  - MRSA neg  - collect sputum cx if able to produce

## 2025-01-19 DIAGNOSIS — I63.9 CEREBRAL INFARCTION, UNSPECIFIED: ICD-10-CM

## 2025-01-19 LAB
ALBUMIN SERPL ELPH-MCNC: 2.8 G/DL — LOW (ref 3.3–5)
ALP SERPL-CCNC: 72 U/L — SIGNIFICANT CHANGE UP (ref 40–120)
ALT FLD-CCNC: 33 U/L — SIGNIFICANT CHANGE UP (ref 10–45)
ANION GAP SERPL CALC-SCNC: 10 MMOL/L — SIGNIFICANT CHANGE UP (ref 5–17)
AST SERPL-CCNC: 30 U/L — SIGNIFICANT CHANGE UP (ref 10–40)
BASOPHILS # BLD AUTO: 0.03 K/UL — SIGNIFICANT CHANGE UP (ref 0–0.2)
BASOPHILS NFR BLD AUTO: 0.4 % — SIGNIFICANT CHANGE UP (ref 0–2)
BILIRUB SERPL-MCNC: 0.2 MG/DL — SIGNIFICANT CHANGE UP (ref 0.2–1.2)
BUN SERPL-MCNC: 18 MG/DL — SIGNIFICANT CHANGE UP (ref 7–23)
CALCIUM SERPL-MCNC: 8.6 MG/DL — SIGNIFICANT CHANGE UP (ref 8.4–10.5)
CHLORIDE SERPL-SCNC: 104 MMOL/L — SIGNIFICANT CHANGE UP (ref 96–108)
CO2 SERPL-SCNC: 24 MMOL/L — SIGNIFICANT CHANGE UP (ref 22–31)
CREAT SERPL-MCNC: 1.1 MG/DL — SIGNIFICANT CHANGE UP (ref 0.5–1.3)
EGFR: 73 ML/MIN/1.73M2 — SIGNIFICANT CHANGE UP
EOSINOPHIL # BLD AUTO: 0.42 K/UL — SIGNIFICANT CHANGE UP (ref 0–0.5)
EOSINOPHIL NFR BLD AUTO: 5.9 % — SIGNIFICANT CHANGE UP (ref 0–6)
GLUCOSE SERPL-MCNC: 92 MG/DL — SIGNIFICANT CHANGE UP (ref 70–99)
HCT VFR BLD CALC: 31 % — LOW (ref 39–50)
HGB BLD-MCNC: 10.3 G/DL — LOW (ref 13–17)
IMM GRANULOCYTES NFR BLD AUTO: 0.4 % — SIGNIFICANT CHANGE UP (ref 0–0.9)
LYMPHOCYTES # BLD AUTO: 3.53 K/UL — HIGH (ref 1–3.3)
LYMPHOCYTES # BLD AUTO: 49.4 % — HIGH (ref 13–44)
MAGNESIUM SERPL-MCNC: 1.8 MG/DL — SIGNIFICANT CHANGE UP (ref 1.6–2.6)
MCHC RBC-ENTMCNC: 32.2 PG — SIGNIFICANT CHANGE UP (ref 27–34)
MCHC RBC-ENTMCNC: 33.2 G/DL — SIGNIFICANT CHANGE UP (ref 32–36)
MCV RBC AUTO: 96.9 FL — SIGNIFICANT CHANGE UP (ref 80–100)
MONOCYTES # BLD AUTO: 1.04 K/UL — HIGH (ref 0–0.9)
MONOCYTES NFR BLD AUTO: 14.5 % — HIGH (ref 2–14)
NEUTROPHILS # BLD AUTO: 2.1 K/UL — SIGNIFICANT CHANGE UP (ref 1.8–7.4)
NEUTROPHILS NFR BLD AUTO: 29.4 % — LOW (ref 43–77)
NRBC # BLD: 0 /100 WBCS — SIGNIFICANT CHANGE UP (ref 0–0)
NRBC BLD-RTO: 0 /100 WBCS — SIGNIFICANT CHANGE UP (ref 0–0)
PHOSPHATE SERPL-MCNC: 2.8 MG/DL — SIGNIFICANT CHANGE UP (ref 2.5–4.5)
PLATELET # BLD AUTO: 304 K/UL — SIGNIFICANT CHANGE UP (ref 150–400)
POTASSIUM SERPL-MCNC: 3.9 MMOL/L — SIGNIFICANT CHANGE UP (ref 3.5–5.3)
POTASSIUM SERPL-SCNC: 3.9 MMOL/L — SIGNIFICANT CHANGE UP (ref 3.5–5.3)
PROT SERPL-MCNC: 5.6 G/DL — LOW (ref 6–8.3)
RBC # BLD: 3.2 M/UL — LOW (ref 4.2–5.8)
RBC # FLD: 14 % — SIGNIFICANT CHANGE UP (ref 10.3–14.5)
SODIUM SERPL-SCNC: 138 MMOL/L — SIGNIFICANT CHANGE UP (ref 135–145)
WBC # BLD: 7.15 K/UL — SIGNIFICANT CHANGE UP (ref 3.8–10.5)
WBC # FLD AUTO: 7.15 K/UL — SIGNIFICANT CHANGE UP (ref 3.8–10.5)

## 2025-01-19 PROCEDURE — 99233 SBSQ HOSP IP/OBS HIGH 50: CPT

## 2025-01-19 RX ORDER — ASPIRIN 81 MG/1
81 TABLET, COATED ORAL DAILY
Refills: 0 | Status: DISCONTINUED | OUTPATIENT
Start: 2025-01-19 | End: 2025-01-24

## 2025-01-19 RX ORDER — MAGNESIUM SULFATE 0.8 MEQ/ML
2 AMPUL (ML) INJECTION ONCE
Refills: 0 | Status: COMPLETED | OUTPATIENT
Start: 2025-01-19 | End: 2025-01-19

## 2025-01-19 RX ORDER — MIDODRINE HYDROCHLORIDE 5 MG/1
5 TABLET ORAL EVERY 8 HOURS
Refills: 0 | Status: DISCONTINUED | OUTPATIENT
Start: 2025-01-19 | End: 2025-01-20

## 2025-01-19 RX ADMIN — Medication 2 TABLET(S): at 22:51

## 2025-01-19 RX ADMIN — IPRATROPIUM BROMIDE AND ALBUTEROL SULFATE 3 MILLILITER(S): .5; 2.5 SOLUTION RESPIRATORY (INHALATION) at 10:08

## 2025-01-19 RX ADMIN — Medication 1000 UNIT(S): at 09:09

## 2025-01-19 RX ADMIN — IPRATROPIUM BROMIDE AND ALBUTEROL SULFATE 3 MILLILITER(S): .5; 2.5 SOLUTION RESPIRATORY (INHALATION) at 22:32

## 2025-01-19 RX ADMIN — Medication 1 MILLIGRAM(S): at 09:09

## 2025-01-19 RX ADMIN — MIDODRINE HYDROCHLORIDE 5 MILLIGRAM(S): 5 TABLET ORAL at 13:43

## 2025-01-19 RX ADMIN — POLYETHYLENE GLYCOL 3350 17 GRAM(S): 17 POWDER, FOR SOLUTION ORAL at 09:09

## 2025-01-19 RX ADMIN — Medication 0.5 MILLIGRAM(S): at 22:33

## 2025-01-19 RX ADMIN — Medication 1 TABLET(S): at 09:09

## 2025-01-19 RX ADMIN — MIDODRINE HYDROCHLORIDE 10 MILLIGRAM(S): 5 TABLET ORAL at 06:29

## 2025-01-19 RX ADMIN — ASPIRIN 81 MILLIGRAM(S): 81 TABLET, COATED ORAL at 09:09

## 2025-01-19 RX ADMIN — MIDODRINE HYDROCHLORIDE 5 MILLIGRAM(S): 5 TABLET ORAL at 22:32

## 2025-01-19 RX ADMIN — ATORVASTATIN CALCIUM 20 MILLIGRAM(S): 80 TABLET, FILM COATED ORAL at 22:32

## 2025-01-19 RX ADMIN — ENOXAPARIN SODIUM 40 MILLIGRAM(S): 100 INJECTION SUBCUTANEOUS at 13:43

## 2025-01-19 RX ADMIN — IPRATROPIUM BROMIDE AND ALBUTEROL SULFATE 3 MILLILITER(S): .5; 2.5 SOLUTION RESPIRATORY (INHALATION) at 18:10

## 2025-01-19 RX ADMIN — Medication 25 GRAM(S): at 14:14

## 2025-01-19 RX ADMIN — IPRATROPIUM BROMIDE AND ALBUTEROL SULFATE 3 MILLILITER(S): .5; 2.5 SOLUTION RESPIRATORY (INHALATION) at 06:28

## 2025-01-19 RX ADMIN — Medication 100 MILLIGRAM(S): at 09:09

## 2025-01-19 RX ADMIN — LEVOTHYROXINE SODIUM 25 MICROGRAM(S): 25 TABLET ORAL at 06:29

## 2025-01-19 RX ADMIN — BUPROPION HYDROCHLORIDE 300 MILLIGRAM(S): 150 TABLET, EXTENDED RELEASE ORAL at 09:09

## 2025-01-19 NOTE — PROGRESS NOTE ADULT - PROBLEM SELECTOR PLAN 11
F: s/p 500mL LR  E: Replete K>4 Mg>2 Phos>3  D: Regular with Ensure High Protein BID  GI PPx: None  VTE PPx: Lovenox   Code status: Full  Dispo: [facility]

## 2025-01-19 NOTE — PROGRESS NOTE ADULT - PROBLEM SELECTOR PLAN 2
Patient met sepsis criteria on admission (HR >90, RR >20). RVP +influenza A. UA neg for UTI. Lactate elevated to 4.6. Hypotension refractory to 3L fluids.    Lactate cleared s/p 3L fluids. s/p CTX 1g and azithromycin in ED. Procal (after receiving abx) 1.05.  - s/p CTX 1g/Azithro 500mg x 1 (1/12)  - c/w Tamiflu 30mg q24h x5d (1/12 - 1/16) - dose adjusted for CrCl increased to 30mg BID 1/13  - Will keep off abx for now if persistently febrile or if worsening shock will start CTX 2g/Azithro 500mg for CAP coverage  - BCx no growth at 72h  - Urine legionella negative  - MRSA neg  - collect sputum cx if able to produce IMPROVED  Pt initially admitted to MICU with septic shock requiring pressors, now resolved and weaned to midodrine. Has also since been transitioned to RA.  - Decrease midodrine to 5mg PO q8hrs, further titrate to 5mg PO q12hrs 1/20 AM if BP maintained overnight  - s/p Tamiflu  - Cont duonebs PRN  - Incentive spirometry

## 2025-01-19 NOTE — PROGRESS NOTE ADULT - PROBLEM SELECTOR PLAN 1
#Acute hypoxic respiratory failure  #Influenza PNA  CTA: negative for PE but showed bilateral lower lobe mucus plugging as well as bronchial wall thickening.   s/p 3L fluids in [facility] ED, remained hypotensive sBP 80s, MAP low 60. TSH wnl. On Lisinopril/HCTZ outpatient  - Weaned off levophed  - Midodrine increased to 10mg TID 1/13 PM --> increased to 20mg TID 1/15 PM --> weaned down on 1/17 to 15mg TID  - started fludrocortisone 0.1mg qd 1/14 PM --> discontinued 1/16  - Watch HR closely with initiation of midodrine given h/o bradycardia  - Lipid panel (Total: 85 HDL: 31 LDL: 36)   - c/w atorvastatin 20mg at bedtime  - Maintain MAP>65   - s/p 500mL LR bolus this morning   - Hold BP meds  - Weaned to room air as tolerated, Maintain SpO2 goal >92%   - Duoneb q4h standing  - chest PT BID, incentive spirometry, aerobika Pt noted to have unsteady gait and dizziness, found to have small foci of restricted diffusion in the right parietal lobe and right occipital lobe and left parietal lobe possible related to subacute/chronic small vessel disease cerebral infarcts on MRI. TTE unremarkable, LDL 36, A1c 6.4  - Cont ASA 81mg PO qd  - Cont Atorvastatin

## 2025-01-19 NOTE — PROGRESS NOTE ADULT - PROBLEM SELECTOR PLAN 3
SHIRA likely prerenal 2/2 hypovolemia (Resolved)  Cr 1.3-1.4 in 2022 and per PCP 1.27 in August 2024. P/w Cr 2.24, suspect pre-renal iso septic shock and decreased po intake. UA spec grav elevated 1.046, CK 1026, FeNa 0.9%   - BMP daily   - Strict I/O IMPROVED  Patient met sepsis criteria on admission (HR >90, RR >20). RVP +influenza A. UA neg for UTI. Lactate elevated to 4.6. Hypotension refractory to 3L fluids.    Lactate cleared s/p 3L fluids. s/p CTX 1g and azithromycin in ED. Procal (after receiving abx) 1.05.  - s/p tamiflu

## 2025-01-19 NOTE — PROGRESS NOTE ADULT - PROBLEM SELECTOR PLAN 4
#Chronic Dizziness and unsteady gait  Patient c/o dizziness x 2 weeks. Per patient's PCP he has been chronically been c/o dizziness x months and has been having gait difficulties  - Head CT w Moderate severity chronic white matter microvascular type changes.  - Neuro consulted, f/u further recs  - vEEG read from 1/16: no evidence of seizures  - f/u MR Head w/wo IV contrast  - f/u MRA Neck w/wo IV contrast  - c/w meclizine 25mg q12h PRN   - c/w thiamine 100mg qd  - consider PT/OT consult  - fu neuro and stroke recs Patient c/o dizziness x 2 weeks. Per patient's PCP he has been chronically been c/o dizziness x months and has been having gait difficulties. vEEG negative for seniors but found to have subactue/chronic infarcts now on secondary stroke prevention  - c/w meclizine 25mg q12h PRN   - c/w thiamine 100mg qd  - Fall precautions  - TARYN recommended

## 2025-01-19 NOTE — PROGRESS NOTE ADULT - ASSESSMENT
69M PMH (per chart review) EtOH use disorder, depression, former smoker (20 pack year quit 15 years ago), HTN, hypothyroid, CAD, diabetes who was BIBEMS to [facility] from shelter for SOB, cough, fevers, and malaise, initially admitted to MICU in septic shock 2/2 influenza now improved. course complicated by dizziness, found to have subacute/chronic infacts on MRI -- stable for RMF for continued mgmt

## 2025-01-19 NOTE — PROGRESS NOTE ADULT - PROBLEM SELECTOR PLAN 8
#MDD  #h/o schizoaffective disorder  Psychiatrist Dr. Corrales. Per chart review, has previously been prescribed Wellbutrin, risperidone, clonazepam, gabapentin; reports only taking Wellbutrin.   Home med: Wellbutrin SR 200mg BID, iSTOP with clonazepam filled monthly last filled 12/18 and per discussion today with patient's PCP he has most recently been rx'd risperidone, clonazepam, Wellbutrin (per the  shop last filled on 12/19/24)  - c/w buproprion 300mg qd (therapeutic interchange for Wellbutrin 150mg BID)   - c/w risperidone 0.5mg HS  - c/w clonazepam 0.5mg HS Anemia likely anemia of chronic disease or from sepsis   Hgb 10.3 s/p 3L fluid resuscitation. Possible h/o pernicious anemia per [facility] chart review.   - Folate 6.0, B12 596  - folic acid and MV as above  - Maintain active T&S

## 2025-01-19 NOTE — PROGRESS NOTE ADULT - PROBLEM SELECTOR PLAN 9
H/o Alcohol use disorder   ~2 drinks per week, Last drink 1/10. Per chart review, was empirically treated for Wernicke’s encephalopathy in 2022 at [place]. Discussed with mary's PCP about AUD and she states he is not forthcoming about his alcohol intake and quantity and frequency is not clear.   - IV thiamine 500mg qd x3d then 100mg qd  - c/w folic acid 1mg qd, multivitamin 1 tablet qd  - utox unable to be run x 2 due to acidic urine  - CIWA protocol will consider phenobarb protocol if CIWA > 8 F: PO  E: Replete K>4 Mg>2 Phos>3  D: Regular with Ensure High Protein BID  GI PPx: None  VTE PPx: Lovenox   Code status: Full  Dispo: [id number]

## 2025-01-19 NOTE — PROGRESS NOTE ADULT - PROBLEM SELECTOR PLAN 7
Previously prescribed Synthroid 25mcg qd filled 12/12/25 per the  shop   - TSH 1.18; T4 4.52  - c/w home dose of levothyroxine 25mcg H/o Alcohol use disorder   ~2 drinks per week, Last drink 1/10. Per chart review, was empirically treated for Wernicke’s encephalopathy in 2022 at [place]. Discussed with mary's PCP about AUD and she states he is not forthcoming about his alcohol intake and quantity and frequency is not clear.   - thiamine 100mg qd PO  - c/w folic acid 1mg qd, multivitamin 1 tablet qd

## 2025-01-19 NOTE — PROGRESS NOTE ADULT - PROBLEM SELECTOR PLAN 5
, ALT 89. Ddx hypoperfusion iso septic shock vs alcohol hepatitis  - LFTs continue to downtrend  - Hepatitis panel negative   - RUQ not indicated no RUQ tenderness and LFTs almost normal Previously prescribed Synthroid 25mcg qd filled 12/12/25 per the  shop   - TSH 1.18; T4 4.52  - c/w home dose of levothyroxine 25mcg

## 2025-01-19 NOTE — PROGRESS NOTE ADULT - SUBJECTIVE AND OBJECTIVE BOX
***Note in progress***    OVERNIGHT EVENTS: NAEO    SUBJECTIVE / INTERVAL HPI: Patient seen and examined at bedside. Patient denying chest pain, SOB, palpitations, cough. Patient denies fever, chills, HA, Dizziness, change in vision/hearing, N/V, abdominal pain, diarrhea, constipation, hematochezia/melena, dysuria, hematuria, new onset weakness/numbness, LE pain and/or swelling.    Remaining ROS negative       PHYSICAL EXAM:    General: WDWN  HEENT: NC/AT; PERRL, anicteric sclera; MMM  Neck: supple  Cardiovascular: +S1/S2, RRR  Respiratory: CTA B/L; no W/R/R  Gastrointestinal: soft, NT/ND; +BSx4  Extremities: WWP; no edema, clubbing or cyanosis  Vascular: 2+ radial, DP/PT pulses B/L  Neurological: AAOx3; no focal deficits  Psychiatric: pleasant mood and affect  Dermatologic: no appreciable wounds or damage to the skin    VITAL SIGNS:  Vital Signs Last 24 Hrs  T(C): 36.4 (19 Jan 2025 05:40), Max: 36.6 (18 Jan 2025 17:16)  T(F): 97.6 (19 Jan 2025 05:40), Max: 97.8 (18 Jan 2025 17:16)  HR: 63 (19 Jan 2025 05:40) (58 - 84)  BP: 120/80 (19 Jan 2025 05:40) (108/62 - 138/94)  BP(mean): 93 (19 Jan 2025 05:40) (80 - 111)  RR: 18 (19 Jan 2025 05:40) (16 - 18)  SpO2: 96% (19 Jan 2025 05:40) (96% - 97%)    Parameters below as of 19 Jan 2025 05:40  Patient On (Oxygen Delivery Method): room air          MEDICATIONS:  MEDICATIONS  (STANDING):  albuterol/ipratropium for Nebulization 3 milliLiter(s) Nebulizer every 6 hours  aspirin  chewable 81 milliGRAM(s) Oral daily  atorvastatin 20 milliGRAM(s) Oral at bedtime  buPROPion XL (24-Hour) . 300 milliGRAM(s) Oral daily  cholecalciferol 1000 Unit(s) Oral daily  clonazePAM  Tablet 0.5 milliGRAM(s) Oral at bedtime  dextrose 5%. 1000 milliLiter(s) (50 mL/Hr) IV Continuous <Continuous>  dextrose 5%. 1000 milliLiter(s) (100 mL/Hr) IV Continuous <Continuous>  dextrose 50% Injectable 25 Gram(s) IV Push once  dextrose 50% Injectable 12.5 Gram(s) IV Push once  dextrose 50% Injectable 25 Gram(s) IV Push once  enoxaparin Injectable 40 milliGRAM(s) SubCutaneous every 24 hours  folic acid 1 milliGRAM(s) Oral daily  glucagon  Injectable 1 milliGRAM(s) IntraMuscular once  influenza  Vaccine (HIGH DOSE) 0.5 milliLiter(s) IntraMuscular once  insulin lispro (ADMELOG) corrective regimen sliding scale   SubCutaneous Before meals and at bedtime  levothyroxine 25 MICROGram(s) Oral daily  midodrine 5 milliGRAM(s) Oral every 8 hours  multivitamin  Chewable 1 Tablet(s) Oral daily  polyethylene glycol 3350 17 Gram(s) Oral daily  risperiDONE   Tablet 0.5 milliGRAM(s) Oral at bedtime  senna 2 Tablet(s) Oral at bedtime  thiamine 100 milliGRAM(s) Oral daily    MEDICATIONS  (PRN):  dextrose Oral Gel 15 Gram(s) Oral once PRN Blood Glucose LESS THAN 70 milliGRAM(s)/deciliter  meclizine 25 milliGRAM(s) Oral every 12 hours PRN Dizziness      ALLERGIES:  Allergies    No Known Allergies    Intolerances        LABS:                        10.3   7.15  )-----------( 304      ( 19 Jan 2025 08:43 )             31.0     01-19    138  |  104  |  18  ----------------------------<  92  3.9   |  24  |  1.10    Ca    8.6      19 Jan 2025 08:43  Phos  2.8     01-19  Mg     1.8     01-19    TPro  5.6[L]  /  Alb  2.8[L]  /  TBili  0.2  /  DBili  x   /  AST  30  /  ALT  33  /  AlkPhos  72  01-19      Urinalysis Basic - ( 19 Jan 2025 08:43 )    Color: x / Appearance: x / SG: x / pH: x  Gluc: 92 mg/dL / Ketone: x  / Bili: x / Urobili: x   Blood: x / Protein: x / Nitrite: x   Leuk Esterase: x / RBC: x / WBC x   Sq Epi: x / Non Sq Epi: x / Bacteria: x      CAPILLARY BLOOD GLUCOSE      POCT Blood Glucose.: 87 mg/dL (19 Jan 2025 09:54)      RADIOLOGY & ADDITIONAL TESTS: Reviewed. OVERNIGHT EVENTS: NAEO    SUBJECTIVE / INTERVAL HPI: Patient seen and examined at bedside. No complaints. Remaining ROS negative       PHYSICAL EXAM:  General: NAD, seated in bed comfortably  HEENT: MMM, poor dentition  Cardiovascular: +S1/S2, RRR  Respiratory: CTA B/L; no W/R/R  Gastrointestinal: soft, NT/ND; +BSx4  Extremities: WWP; no edema, clubbing or cyanosis  Vascular: 2+ radial, DP/PT pulses B/L  Neurological: AAOx3; no focal deficits    VITAL SIGNS:  Vital Signs Last 24 Hrs  T(C): 36.4 (19 Jan 2025 05:40), Max: 36.6 (18 Jan 2025 17:16)  T(F): 97.6 (19 Jan 2025 05:40), Max: 97.8 (18 Jan 2025 17:16)  HR: 63 (19 Jan 2025 05:40) (58 - 84)  BP: 120/80 (19 Jan 2025 05:40) (108/62 - 138/94)  BP(mean): 93 (19 Jan 2025 05:40) (80 - 111)  RR: 18 (19 Jan 2025 05:40) (16 - 18)  SpO2: 96% (19 Jan 2025 05:40) (96% - 97%)    Parameters below as of 19 Jan 2025 05:40  Patient On (Oxygen Delivery Method): room air          MEDICATIONS:  MEDICATIONS  (STANDING):  albuterol/ipratropium for Nebulization 3 milliLiter(s) Nebulizer every 6 hours  aspirin  chewable 81 milliGRAM(s) Oral daily  atorvastatin 20 milliGRAM(s) Oral at bedtime  buPROPion XL (24-Hour) . 300 milliGRAM(s) Oral daily  cholecalciferol 1000 Unit(s) Oral daily  clonazePAM  Tablet 0.5 milliGRAM(s) Oral at bedtime  dextrose 5%. 1000 milliLiter(s) (50 mL/Hr) IV Continuous <Continuous>  dextrose 5%. 1000 milliLiter(s) (100 mL/Hr) IV Continuous <Continuous>  dextrose 50% Injectable 25 Gram(s) IV Push once  dextrose 50% Injectable 12.5 Gram(s) IV Push once  dextrose 50% Injectable 25 Gram(s) IV Push once  enoxaparin Injectable 40 milliGRAM(s) SubCutaneous every 24 hours  folic acid 1 milliGRAM(s) Oral daily  glucagon  Injectable 1 milliGRAM(s) IntraMuscular once  influenza  Vaccine (HIGH DOSE) 0.5 milliLiter(s) IntraMuscular once  insulin lispro (ADMELOG) corrective regimen sliding scale   SubCutaneous Before meals and at bedtime  levothyroxine 25 MICROGram(s) Oral daily  midodrine 5 milliGRAM(s) Oral every 8 hours  multivitamin  Chewable 1 Tablet(s) Oral daily  polyethylene glycol 3350 17 Gram(s) Oral daily  risperiDONE   Tablet 0.5 milliGRAM(s) Oral at bedtime  senna 2 Tablet(s) Oral at bedtime  thiamine 100 milliGRAM(s) Oral daily    MEDICATIONS  (PRN):  dextrose Oral Gel 15 Gram(s) Oral once PRN Blood Glucose LESS THAN 70 milliGRAM(s)/deciliter  meclizine 25 milliGRAM(s) Oral every 12 hours PRN Dizziness      ALLERGIES:  Allergies    No Known Allergies    Intolerances        LABS:                        10.3   7.15  )-----------( 304      ( 19 Jan 2025 08:43 )             31.0     01-19    138  |  104  |  18  ----------------------------<  92  3.9   |  24  |  1.10    Ca    8.6      19 Jan 2025 08:43  Phos  2.8     01-19  Mg     1.8     01-19    TPro  5.6[L]  /  Alb  2.8[L]  /  TBili  0.2  /  DBili  x   /  AST  30  /  ALT  33  /  AlkPhos  72  01-19      Urinalysis Basic - ( 19 Jan 2025 08:43 )    Color: x / Appearance: x / SG: x / pH: x  Gluc: 92 mg/dL / Ketone: x  / Bili: x / Urobili: x   Blood: x / Protein: x / Nitrite: x   Leuk Esterase: x / RBC: x / WBC x   Sq Epi: x / Non Sq Epi: x / Bacteria: x      CAPILLARY BLOOD GLUCOSE      POCT Blood Glucose.: 87 mg/dL (19 Jan 2025 09:54)      RADIOLOGY & ADDITIONAL TESTS: Reviewed.

## 2025-01-19 NOTE — PROGRESS NOTE ADULT - PROBLEM SELECTOR PLAN 6
Mild Hyponatremia(132) possibly 2/2 SIADH 2/2 influenza; appears euvolemic on exam  - Daily BMP  - If Na continues to downtrend will consider fluid restrictions #MDD  #h/o schizoaffective disorder  Psychiatrist Dr. Corrales. Per chart review, has previously been prescribed Wellbutrin, risperidone, clonazepam, gabapentin; reports only taking Wellbutrin.   Home med: Wellbutrin SR 200mg BID, iSTOP with clonazepam filled monthly last filled 12/18 and per discussion today with patient's PCP he has most recently been rx'd risperidone, clonazepam, Wellbutrin (per the  shop last filled on 12/19/24)  - c/w buproprion 300mg qd (therapeutic interchange for Wellbutrin 150mg BID)   - c/w risperidone 0.5mg HS  - c/w clonazepam 0.5mg HS

## 2025-01-20 ENCOUNTER — TRANSCRIPTION ENCOUNTER (OUTPATIENT)
Age: 70
End: 2025-01-20

## 2025-01-20 DIAGNOSIS — I63.9 CEREBRAL INFARCTION, UNSPECIFIED: ICD-10-CM

## 2025-01-20 LAB
ANION GAP SERPL CALC-SCNC: 11 MMOL/L — SIGNIFICANT CHANGE UP (ref 5–17)
BUN SERPL-MCNC: 22 MG/DL — SIGNIFICANT CHANGE UP (ref 7–23)
CALCIUM SERPL-MCNC: 8.4 MG/DL — SIGNIFICANT CHANGE UP (ref 8.4–10.5)
CHLORIDE SERPL-SCNC: 103 MMOL/L — SIGNIFICANT CHANGE UP (ref 96–108)
CO2 SERPL-SCNC: 25 MMOL/L — SIGNIFICANT CHANGE UP (ref 22–31)
CREAT SERPL-MCNC: 1.07 MG/DL — SIGNIFICANT CHANGE UP (ref 0.5–1.3)
EGFR: 75 ML/MIN/1.73M2 — SIGNIFICANT CHANGE UP
GLUCOSE SERPL-MCNC: 85 MG/DL — SIGNIFICANT CHANGE UP (ref 70–99)
MAGNESIUM SERPL-MCNC: 2 MG/DL — SIGNIFICANT CHANGE UP (ref 1.6–2.6)
PHOSPHATE SERPL-MCNC: 2.9 MG/DL — SIGNIFICANT CHANGE UP (ref 2.5–4.5)
POTASSIUM SERPL-MCNC: 4.1 MMOL/L — SIGNIFICANT CHANGE UP (ref 3.5–5.3)
POTASSIUM SERPL-SCNC: 4.1 MMOL/L — SIGNIFICANT CHANGE UP (ref 3.5–5.3)
SODIUM SERPL-SCNC: 139 MMOL/L — SIGNIFICANT CHANGE UP (ref 135–145)

## 2025-01-20 PROCEDURE — 99233 SBSQ HOSP IP/OBS HIGH 50: CPT

## 2025-01-20 RX ORDER — MIDODRINE HYDROCHLORIDE 5 MG/1
5 TABLET ORAL
Refills: 0 | Status: DISCONTINUED | OUTPATIENT
Start: 2025-01-20 | End: 2025-01-21

## 2025-01-20 RX ORDER — ASPIRIN 81 MG/1
1 TABLET, COATED ORAL
Qty: 30 | Refills: 3
Start: 2025-01-20 | End: 2025-05-19

## 2025-01-20 RX ORDER — LEVOTHYROXINE SODIUM 25 UG/1
1 TABLET ORAL
Qty: 90 | Refills: 3
Start: 2025-01-20 | End: 2026-01-14

## 2025-01-20 RX ORDER — ATORVASTATIN CALCIUM 80 MG/1
1 TABLET, FILM COATED ORAL
Qty: 90 | Refills: 3
Start: 2025-01-20 | End: 2026-01-14

## 2025-01-20 RX ORDER — FOLIC ACID 1 MG
1 TABLET ORAL
Qty: 30 | Refills: 3
Start: 2025-01-20 | End: 2025-05-19

## 2025-01-20 RX ORDER — MECOBAL/LEVOMEFOLAT CA/B6 PHOS 2-3-35 MG
1 TABLET ORAL
Qty: 90 | Refills: 3
Start: 2025-01-20 | End: 2026-01-14

## 2025-01-20 RX ADMIN — BUPROPION HYDROCHLORIDE 300 MILLIGRAM(S): 150 TABLET, EXTENDED RELEASE ORAL at 12:58

## 2025-01-20 RX ADMIN — MIDODRINE HYDROCHLORIDE 5 MILLIGRAM(S): 5 TABLET ORAL at 06:36

## 2025-01-20 RX ADMIN — IPRATROPIUM BROMIDE AND ALBUTEROL SULFATE 3 MILLILITER(S): .5; 2.5 SOLUTION RESPIRATORY (INHALATION) at 10:17

## 2025-01-20 RX ADMIN — Medication 1 MILLIGRAM(S): at 12:58

## 2025-01-20 RX ADMIN — Medication 1000 UNIT(S): at 12:59

## 2025-01-20 RX ADMIN — ENOXAPARIN SODIUM 40 MILLIGRAM(S): 100 INJECTION SUBCUTANEOUS at 12:59

## 2025-01-20 RX ADMIN — ATORVASTATIN CALCIUM 20 MILLIGRAM(S): 80 TABLET, FILM COATED ORAL at 23:19

## 2025-01-20 RX ADMIN — Medication 2 TABLET(S): at 23:18

## 2025-01-20 RX ADMIN — Medication 1 TABLET(S): at 12:57

## 2025-01-20 RX ADMIN — MIDODRINE HYDROCHLORIDE 5 MILLIGRAM(S): 5 TABLET ORAL at 18:45

## 2025-01-20 RX ADMIN — LEVOTHYROXINE SODIUM 25 MICROGRAM(S): 25 TABLET ORAL at 06:36

## 2025-01-20 RX ADMIN — Medication 0.5 MILLIGRAM(S): at 23:19

## 2025-01-20 RX ADMIN — ASPIRIN 81 MILLIGRAM(S): 81 TABLET, COATED ORAL at 12:58

## 2025-01-20 RX ADMIN — IPRATROPIUM BROMIDE AND ALBUTEROL SULFATE 3 MILLILITER(S): .5; 2.5 SOLUTION RESPIRATORY (INHALATION) at 16:04

## 2025-01-20 RX ADMIN — POLYETHYLENE GLYCOL 3350 17 GRAM(S): 17 POWDER, FOR SOLUTION ORAL at 12:57

## 2025-01-20 RX ADMIN — IPRATROPIUM BROMIDE AND ALBUTEROL SULFATE 3 MILLILITER(S): .5; 2.5 SOLUTION RESPIRATORY (INHALATION) at 23:18

## 2025-01-20 RX ADMIN — Medication 0.5 MILLIGRAM(S): at 23:18

## 2025-01-20 RX ADMIN — Medication 100 MILLIGRAM(S): at 12:58

## 2025-01-20 NOTE — PROGRESS NOTE ADULT - PROBLEM SELECTOR PLAN 11
F: s/p 500mL LR  E: Replete K>4 Mg>2 Phos>3  D: Regular with Ensure High Protein BID  GI PPx: None  VTE PPx: Lovenox   Code status: Full  Dispo: [facility] Anemia likely anemia of chronic disease or from sepsis   Hgb 10.3 s/p 3L fluid resuscitation. Possible h/o pernicious anemia per [facility] chart review.   - Folate 6.0, B12 596  - folic acid and MV as above  - Maintain active T&S  - Daily CBC

## 2025-01-20 NOTE — PROGRESS NOTE ADULT - PROBLEM SELECTOR PLAN 8
#MDD  #h/o schizoaffective disorder  Psychiatrist Dr. Corrales. Per chart review, has previously been prescribed Wellbutrin, risperidone, clonazepam, gabapentin; reports only taking Wellbutrin.   Home med: Wellbutrin SR 200mg BID, iSTOP with clonazepam filled monthly last filled 12/18 and per discussion today with patient's PCP he has most recently been rx'd risperidone, clonazepam, Wellbutrin (per the  shop last filled on 12/19/24)  - c/w buproprion 300mg qd (therapeutic interchange for Wellbutrin 150mg BID)   - c/w risperidone 0.5mg HS  - c/w clonazepam 0.5mg HS Previously prescribed Synthroid 25mcg qd filled 12/12/25 per the  shop   - TSH 1.18; T4 4.52  - c/w home dose of levothyroxine 25mcg

## 2025-01-20 NOTE — PROGRESS NOTE ADULT - PROBLEM SELECTOR PLAN 5
, ALT 89. Ddx hypoperfusion iso septic shock vs alcohol hepatitis  - LFTs continue to downtrend  - Hepatitis panel negative   - RUQ not indicated no RUQ tenderness and LFTs almost normal #Chronic Dizziness and unsteady gait  Patient c/o dizziness x 2 weeks. Per patient's PCP he has been chronically been c/o dizziness x months and has been having gait difficulties  - Head CT w Moderate severity chronic white matter microvascular type changes.  - Neuro consulted, f/u further recs  - vEEG read from 1/16: no evidence of seizures  - f/u MR Head w/wo IV contrast  - f/u MRA Neck w/wo IV contrast  - c/w meclizine 25mg q12h PRN   - c/w thiamine 100mg qd  - consider PT/OT consult  - fu neuro and stroke recs

## 2025-01-20 NOTE — PROGRESS NOTE ADULT - ASSESSMENT
69M PMH (per chart review) EtOH use disorder, HTN, ?hypothyroidism, ?schizoaffective disorder, CAD, ?CKD, diabetes who was BIBEMS to [facility] from shelter for SOB, cough, fevers, and malaise admitted to MICU for septic shock 2/2 influenza. Neurology consulted for dizziness. He says he started with dizziness, described as disequilibrium (rocking on a ship), 2 weeks ago (however per medicine team may have started months ago as they called his PCP) with associated hearing loss and tinnitus (buzzing). He says his symptoms are intermittent, feels asymptomatic when lying down but has the sensation when he stands up and tries to walk. No visual symptoms. He thinks the symptoms started gradually but has trouble pinpointing an exact time at onset. No other alleviating or exacerbating symptoms. Pt was started on fludrocortisone, high dose thiamine, underwent head CT showing chronic microvascular white matter changes, and pt is now pending EEG to assess for cerebral dysfunction/slowing and MRI head/neck. His MRI was notable for an acute/subacute stroke in the posterior circulation which was originally ordered to identify his dizziness. MRA unremarkable. TTE normal. Started ASA, on statins, tolerating meds well, no SE.     Impression: Small foci of restricted diffusion in the right parietal lobe and right occipital lobe and left parietal lobe possible related to subacute/chronic small vessel disease cerebral infarctions    1)Secondary stroke prevention  - continue ASA 81mg PO daily  - continue Atorvastatin 20mg PO daily      2) Stroke risk factors  - TTE unremarkable  - A1C: 6.4  - LDL: 36  - HTN    3) Further management  - may need outpt neurology follow up  - provide stroke education  - PT/OT, balance training  - ENT consult in OP settings for hearing issues    DVT prophylaxis   -Lovenox SQ    Discussed with Neurology Attending Dr. Mcguire         69M PMH (per chart review) EtOH use disorder, HTN, ?hypothyroidism, ?schizoaffective disorder, CAD, ?CKD, diabetes who was BIBEMS to [facility] from shelter for SOB, cough, fevers, and malaise admitted to MICU for septic shock 2/2 influenza. Neurology consulted for dizziness. He says he started with dizziness, described as disequilibrium (rocking on a ship), 2 weeks ago (however per medicine team may have started months ago as they called his PCP) with associated hearing loss and tinnitus (buzzing). He says his symptoms are intermittent, feels asymptomatic when lying down but has the sensation when he stands up and tries to walk. No visual symptoms. He thinks the symptoms started gradually but has trouble pinpointing an exact time at onset. No other alleviating or exacerbating symptoms. Pt was started on fludrocortisone, high dose thiamine, underwent head CT showing chronic microvascular white matter changes, and pt is now pending EEG to assess for cerebral dysfunction/slowing and MRI head/neck. His MRI was notable for an acute/subacute stroke in the posterior circulation which was originally ordered to identify his dizziness. MRA unremarkable. TTE normal. Started ASA, on statins, tolerating meds well, no SE. - TTE unremarkable, - A1C: 6.4, - LDL: 36  Impression: Small foci of restricted diffusion in the right parietal lobe and right occipital lobe and left parietal lobe possible related to subacute/chronic small vessel disease cerebral infarctions    Recommendations:     - Please obtain CTA head and MARCO ANTONIO  - continue ASA 81mg PO daily  - continue Atorvastatin 20mg PO daily  - he may f/u outpt neurology follow up  - provide stroke education  - PT/OT, balance training  - ENT consult in OP settings for hearing issues         Discussed with Neurology Attending Dr. Mcguire

## 2025-01-20 NOTE — PROGRESS NOTE ADULT - PROBLEM SELECTOR PLAN 10
Anemia likely anemia of chronic disease or from sepsis   Hgb 10.3 s/p 3L fluid resuscitation. Possible h/o pernicious anemia per [facility] chart review.   - Folate 6.0, B12 596  - folic acid and MV as above  - Maintain active T&S  - Daily CBC H/o Alcohol use disorder   ~2 drinks per week, Last drink 1/10. Per chart review, was empirically treated for Wernicke’s encephalopathy in 2022 at [facility]. Discussed with mary's PCP about AUD and she states he is not forthcoming about his alcohol intake and quantity and frequency is not clear.   - IV thiamine 500mg qd x3d then 100mg qd  - c/w folic acid 1mg qd, multivitamin 1 tablet qd  - utox unable to be run x 2 due to acidic urine  - CIWA protocol will consider phenobarb protocol if CIWA > 8

## 2025-01-20 NOTE — DISCHARGE NOTE PROVIDER - CARE PROVIDER_API CALL
Jose Loyd NP in Family Health  [address] [address]  Phone: [phone]  Fax: [phone]  Scheduled Appointment: 02/07/2025 01:00 PM

## 2025-01-20 NOTE — DISCHARGE NOTE PROVIDER - HOSPITAL COURSE
#Discharge: do not delete    URSULA ACKERMAN is a 69M with PMHx of AUD, ?schizoaffective disorder, CAD, HTN, ?CKD, and DM who initially presented to [facility] ED BIBEMS from shelter for SOB, cough, fever and malaise. Labs significant for lactic acidosis, pre-renal SHIRA, and transaminitis c/w septic shock, pt was hypotensive to SBP 80s and MAP 60 s/p 3L LR after which pt was started on Levo, given CTX x1, Azithro x1, was started on Tamiflu and admitted to MICU. Since admission to MICU pt has been continue on Tamiflu and monitored off Abx w/ BCx NGTD, UA neg, UStrep neg, ULeg neg, MRSA neg. POCUS was also without gross consolidation or inflitrate. TTE 1/13 unremarkable. Pt was started on Midodrine 5mg q8 and subsequently weaned off pressors as of 5am 1/14, after which pt received 1L LR and Midodrine was uptitrated to 20mg q8hrs for SBP 80s, MAP 60 on 1/15. Received collateral from outpatient provider that patient baseline BP is hypertensive if anything, not hypotensive. Hospital course c/b pt reporting dizziness w/ changes in position for which Neuro was consulted, pt was started on fludrocortisone, high dose thiamine, underwent head CT showing chronic microvascular white matter changes, and pt is now pending EEG to assess for cerebral dysfunction/slowing and MRI head/neck. PT/OT consulted and rec TARYN.    Problem List/Main Diagnoses (system-based):   #     #     #    Inpatient treatment course:     New medications:   Labs to be followed outpatient:   Exam to be followed outpatient:       LABS & STUDIES:   #Discharge: do not delete    URSULA ACKERMAN is a 69M with PMHx of AUD, ?schizoaffective disorder, CAD, HTN, ?CKD, and DM who [facility], cough, fever and malaise. Labs significant for lactic acidosis, pre-renal SHIRA, and transaminitis c/w septic shock 2/2 Influenza+ PNA, refractory to fluids, requiring pressors, admitted to MICU, managed with Tamiflu, BCx NGTD, UA neg, UStrep neg, ULeg neg, MRSA neg. POCUS was also without gross consolidation or inflitrate. TTE 1/13 unremarkable. Pt was take off pressors and bridged with Midodrine eventually weaned off on 1/20, maintaining blood pressure. Hospital course c/b pt reporting dizziness w/ changes in position for which Neuro was consulted, pt was started on fludrocortisone, high dose thiamine, underwent head CT showing chronic microvascular white matter changes, and MRI head suggestive of acute/subacute stroke on posterior circulation, started on aspirin and statin, MARCO ANTONIO showed.........................., CTA Neck showed......................... PT/OT consulted and rec TARYN.    Problem List/Main Diagnoses (system-based):   # Stroke.   ·  Plan: c/o positional dizziness   MRA neck unremarkable  MRI S/o acute/subacute stroke in posterior circulation  Plan  - c/w aspirin and statin 80mg  - fu TTE  - FU CTA head.    # Pneumonia due to influenza A virus.   ·  Plan: Patient met sepsis criteria on admission (HR >90, RR >20). RVP +influenza A. UA neg for UTI. Lactate elevated to 4.6. Hypotension refractory to 3L fluids.    Lactate cleared s/p 3L fluids. s/p CTX 1g and azithromycin in ED. Procal (after receiving abx) 1.05.  - s/p CTX 1g/Azithro 500mg x 1 (1/12)  - s/p Tamiflu 30mg q24h x5d (1/12 - 1/16) - dose adjusted for CrCl increased to 30mg BID 1/13  - BCx no growth   - Urine legionella negative, MRSA neg      #SHIRA (acute kidney injury) likely prerenal 2/2 hypovolemia (Resolved)  Cr 1.3-1.4 in 2022 and per PCP 1.27 in August 2024. P/w Cr 2.24, suspect pre-renal iso septic shock and decreased po intake. UA spec grav elevated 1.046, CK 1026, FeNa 0.9%   - BMP daily   - Strict I/O.      #Chronic Dizziness and unsteady gait  Patient c/o dizziness x 2 weeks. Per patient's PCP he has been chronically been c/o dizziness x months and has been having gait difficulties  - Head CT w Moderate severity chronic white matter microvascular type changes.  - vEEG read from 1/16: no evidence of seizures  - c/w meclizine 25mg q12h PRN   - c/w thiamine 100mg qd  - consider PT/OT consult    New medications: meclizine 25mg q12h PRN, aspirin 81mg qd, atorvastatin 80mg qd      LABS & STUDIES:      PHYSICAL EXAM  General: in no acute distress  Eyes: EOMI intact bilaterally. Anicteric sclerae, moist conjunctivae  HENT: Moist mucous membranes  Neck: Trachea midline, supple  Lungs: CTA B/L. No wheezes, rales, or rhonchi  Cardiovascular: RRR. No murmurs, rubs, or gallops  Abdomen: Soft, non-tender non-distended; No rebound or guarding  Extremities: WWP, No clubbing, cyanosis or edema  Neurological: Alert and oriented  Skin: Warm and dry. No obvious rash #Discharge: do not delete    URSULA ACKERMAN is a 69M with PMHx of AUD, ?schizoaffective disorder, CAD, HTN, ?CKD, and DM who [facility], cough, fever and malaise. Labs significant for lactic acidosis, pre-renal SHIRA, and transaminitis c/w septic shock 2/2 Influenza+ PNA, refractory to fluids, requiring pressors, admitted to MICU, managed with Tamiflu, BCx NGTD, UA neg, UStrep neg, ULeg neg, MRSA neg. POCUS was also without gross consolidation or inflitrate. TTE  unremarkable. Pt was take off pressors and bridged with Midodrine eventually weaned off on , maintaining blood pressure. Hospital course c/b pt reporting dizziness w/ changes in position for which Neuro was consulted, pt was started on fludrocortisone, high dose thiamine, underwent head CT showing chronic microvascular white matter changes, and MRI head suggestive of acute/subacute stroke on posterior circulation, started on aspirin and statin, MARCO ANTONIO negative CTA Neck negative. PT/OT consulted and rec TARYN.    Problem List/Main Diagnoses (system-based):   # Stroke.   MRA neck unremarkable  MRI S/o acute/subacute stroke in posterior circulation  TTE negative (see [facility] for full report)  CTA head negative  Plan  - c/w aspirin and statin 80mg    # Pneumonia due to influenza A virus.   Patient met sepsis criteria on admission (HR >90, RR >20). RVP +influenza A. UA neg for UTI. Lactate elevated to 4.6. Hypotension refractory to 3L fluids.    Lactate cleared s/p 3L fluids. s/p CTX 1g and azithromycin in ED. Procal (after receiving abx) 1.05.  s/p CTX and azithro and tamiflu   - BCx no growth     #SHIRA (acute kidney injury) likely prerenal 2/2 hypovolemia   Cr 1.3-1.4 in  and per PCP 1.27 in 2024. P/w Cr 2.24, suspect pre-renal iso septic shock and decreased po intake. UA spec grav elevated 1.046, CK 1026, FeNa 0.9%   Resolved    #Chronic Dizziness and unsteady gait  Patient c/o dizziness x 2 weeks. Per patient's PCP he has been chronically been c/o dizziness x months and has been having gait difficulties  - Head CT w Moderate severity chronic white matter microvascular type changes.  - vEEG read from : no evidence of seizures  - c/w meclizine 25mg q12h PRN   - c/w thiamine 100mg qd    New medications: meclizine 25mg q12h PRN, aspirin 81mg qd, atorvastatin 80mg qd    Imagin. CTA 25  IMPRESSION:  Intracranial circulation:  No significant vascular lesion.    2. MR Head w/ wo IV Cont   IMPRESSION:  Small foci of restricted diffusion in the right parietal lobe, right   occipital lobe and left parietal lobe with associated T2 prolongation,   consistent with acute/subacute infarcts. No acute intracranial hemorrhage.  Stable mild ventriculomegaly.  No hemodynamically significant stenosis in the neck.    3. MR Angio Neck No Cont   IMPRESSION:  Small foci of restricted diffusion in the right parietal lobe, right   occipital lobe and left parietal lobe with associated T2 prolongation,   consistent with acute/subacute infarcts. No acute intracranial hemorrhage.  Stable mild ventriculomegaly.  No hemodynamically significant stenosis in the neck.    4. CT HEAD No Cont   FINDINGS: There is no acute intracranial hemorrhage, mass effect, shift   of the midline structures, herniation, extra-axial fluid collection, or   hydrocephalus.  There is diffuse cerebral volume loss with prominence of the sulci,   fissures, and cisternal spaces which is normal for the patient's age.   Mild ventriculomegaly is seen. Variable sulcal prominence is notable. The   callosal angle appears sharp. There is moderate patchy confluent deep and   periventricular white matter hypoattenuation statistically compatible   with microvascular changes given calcific atherosclerotic disease of the   intracranial arteries.  Scattered mucosal thickening is seen throughout the paranasal sinuses.   The bilateral tympanomastoid cavities are clear. The calvarium is intact.   The imaged orbits are unremarkable.  IMPRESSION: No acute intracranial hemorrhage, mass effect, or shift of   the midline structures.  Moderate severity chronic white matter microvascular type changes.             #Discharge: do not delete    URSULA ACKERMAN is a 69M with PMHx of AUD, ?schizoaffective disorder, CAD, HTN, ?CKD, and DM who [facility], cough, fever and malaise. Labs significant for lactic acidosis, pre-renal SHIRA, and transaminitis c/w septic shock 2/2 Influenza+ PNA, refractory to fluids, requiring pressors, admitted to MICU, managed with Tamiflu, BCx NGTD, UA neg, UStrep neg, ULeg neg, MRSA neg. POCUS was also without gross consolidation or inflitrate. TTE  unremarkable. Pt was take off pressors and bridged with Midodrine eventually weaned off on , maintaining blood pressure. Hospital course c/b pt reporting dizziness w/ changes in position for which Neuro was consulted, pt was started on fludrocortisone, high dose thiamine, underwent head CT showing chronic microvascular white matter changes, and MRI head suggestive of acute/subacute stroke on posterior circulation, started on aspirin and statin, MARCO ANTONIO negative CTA Neck negative. PT/OT consulted and rec TARYN.    Problem List/Main Diagnoses (system-based):   # Stroke.   MRA neck unremarkable  MRI S/o acute/subacute stroke in posterior circulation  TTE negative (see [facility] for full report), MARCO ANTONIO neg  CTA head negative  Plan  - c/w aspirin and statin 80mg  Ideally would rec outpatient cardiac monitoring if social situation allows  D/C NIHSS 0    # Pneumonia due to influenza A virus.   Patient met sepsis criteria on admission (HR >90, RR >20). RVP +influenza A. UA neg for UTI. Lactate elevated to 4.6. Hypotension refractory to 3L fluids.    Lactate cleared s/p 3L fluids. s/p CTX 1g and azithromycin in ED. Procal (after receiving abx) 1.05.  s/p CTX and azithro and tamiflu   - BCx no growth     #SHIRA (acute kidney injury) likely prerenal 2/2 hypovolemia   Cr 1.3-1.4 in  and per PCP 1.27 in 2024. P/w Cr 2.24, suspect pre-renal iso septic shock and decreased po intake. UA spec grav elevated 1.046, CK 1026, FeNa 0.9%   Resolved    #Chronic Dizziness and unsteady gait  Patient c/o dizziness x 2 weeks. Per patient's PCP he has been chronically been c/o dizziness x months and has been having gait difficulties  - Head CT w Moderate severity chronic white matter microvascular type changes.  - vEEG read from : no evidence of seizures  - c/w meclizine 25mg q12h PRN   - c/w thiamine 100mg qd    New medications: meclizine 25mg q12h PRN, aspirin 81mg qd, atorvastatin 80mg qd    Imagin. CTA 25  IMPRESSION:  Intracranial circulation:  No significant vascular lesion.    2. MR Head w/ wo IV Cont   IMPRESSION:  Small foci of restricted diffusion in the right parietal lobe, right   occipital lobe and left parietal lobe with associated T2 prolongation,   consistent with acute/subacute infarcts. No acute intracranial hemorrhage.  Stable mild ventriculomegaly.  No hemodynamically significant stenosis in the neck.    3. MR Angio Neck No Cont   IMPRESSION:  Small foci of restricted diffusion in the right parietal lobe, right   occipital lobe and left parietal lobe with associated T2 prolongation,   consistent with acute/subacute infarcts. No acute intracranial hemorrhage.  Stable mild ventriculomegaly.  No hemodynamically significant stenosis in the neck.    4. CT HEAD No Cont   FINDINGS: There is no acute intracranial hemorrhage, mass effect, shift   of the midline structures, herniation, extra-axial fluid collection, or   hydrocephalus.  There is diffuse cerebral volume loss with prominence of the sulci,   fissures, and cisternal spaces which is normal for the patient's age.   Mild ventriculomegaly is seen. Variable sulcal prominence is notable. The   callosal angle appears sharp. There is moderate patchy confluent deep and   periventricular white matter hypoattenuation statistically compatible   with microvascular changes given calcific atherosclerotic disease of the   intracranial arteries.  Scattered mucosal thickening is seen throughout the paranasal sinuses.   The bilateral tympanomastoid cavities are clear. The calvarium is intact.   The imaged orbits are unremarkable.  IMPRESSION: No acute intracranial hemorrhage, mass effect, or shift of   the midline structures.  Moderate severity chronic white matter microvascular type changes.             #Discharge: do not delete    URSULA ACKERMAN is a 69M with PMHx of AUD, ?schizoaffective disorder, CAD, HTN, ?CKD, and DM who initially presented to [facility] ED BIBEMS from shelter for SOB, cough, fever and malaise. Labs significant for lactic acidosis, pre-renal SHIRA, and transaminitis c/w septic shock 2/2 Influenza+ PNA, refractory to fluids, requiring pressors, admitted to MICU, managed with Tamiflu, BCx NGTD, UA neg, UStrep neg, ULeg neg, MRSA neg. POCUS was also without gross consolidation or inflitrate. TTE  unremarkable. Pt was take off pressors and bridged with Midodrine eventually weaned off on , maintaining blood pressure. Hospital course c/b pt reporting dizziness w/ changes in position for which Neuro was consulted, pt was started on fludrocortisone, high dose thiamine, underwent head CT showing chronic microvascular white matter changes, and MRI head suggestive of acute/subacute stroke on posterior circulation, started on aspirin and statin, MARCO ANTONIO negative CTA Neck negative. PT/OT consulted and rec TARYN.    Problem List/Main Diagnoses (system-based):   #Cerebellar stroke with Dizziness and Ataxia  Patient c/o dizziness x 2 weeks. Per patient's PCP he has been chronically been c/o dizziness x months and has been having gait difficulties  MRI S/o acute/subacute stroke in posterior circulation. MRA neck and CTA head unremarkable  vEEG read from : no evidence of seizures  TTE negative for PFO (normal biventricular fxn).  Lipid panel (Total: 85 HDL: 31 LDL: 36)   - c/w aspirin and statin 80mg qhs  - c/w meclizine 25mg q12h PRN   - c/w thiamine 100mg qd.  Ideally would rec outpatient cardiac monitoring if social situation allows  D/C NIHSS 0    # Pneumonia due to influenza A virus.   Patient met sepsis criteria on admission (HR >90, RR >20). RVP +influenza A. UA neg for UTI. Lactate elevated to 4.6. Hypotension refractory to 3L fluids.    Lactate cleared s/p 3L fluids. s/p CTX 1g and azithromycin in ED. Procal (after receiving abx) 1.05.  s/p CTX and azithro and tamiflu   - BCx no growth     #SHIRA (acute kidney injury) likely prerenal 2/2 hypovolemia   Cr 1.3-1.4 in  and per PCP 1.27 in 2024. P/w Cr 2.24, suspect pre-renal iso septic shock and decreased po intake. UA spec grav elevated 1.046, CK 1026, FeNa 0.9%    acute increase in Cr (1.1 -> 1.5), suspect contrast-induced nephropathy iso CTA on . Given 1L fluids.     #Sepsis with acute hypoxic respiratory failure, resolved  #Influenza PNA, resolved  CTA: negative for PE but showed bilateral lower lobe mucus plugging as well as bronchial wall thickening.   s/p 3L fluids in [facility] ED, required pressors, now weaned off pressors, midodrine, and fludrocortisone.    # Hyponatremia due to SIADH, resolved  # Hypothyroidism -  c/w home dose of levothyroxine 25mcg. (TSH 1.18; T4 4.52)  # Anemia of Chronic Disease     New medications: meclizine 25mg q12h PRN, aspirin 81mg qd, atorvastatin 80mg qd    Imagin. CTA 25  IMPRESSION:  Intracranial circulation:  No significant vascular lesion.    2. MR Head w/ wo IV Cont   IMPRESSION:  Small foci of restricted diffusion in the right parietal lobe, right   occipital lobe and left parietal lobe with associated T2 prolongation,   consistent with acute/subacute infarcts. No acute intracranial hemorrhage.  Stable mild ventriculomegaly.  No hemodynamically significant stenosis in the neck.    3. MR Angio Neck No Cont   IMPRESSION:  Small foci of restricted diffusion in the right parietal lobe, right   occipital lobe and left parietal lobe with associated T2 prolongation,   consistent with acute/subacute infarcts. No acute intracranial hemorrhage.  Stable mild ventriculomegaly.  No hemodynamically significant stenosis in the neck.    4. CT HEAD No Cont   FINDINGS: There is no acute intracranial hemorrhage, mass effect, shift   of the midline structures, herniation, extra-axial fluid collection, or   hydrocephalus.  There is diffuse cerebral volume loss with prominence of the sulci,   fissures, and cisternal spaces which is normal for the patient's age.   Mild ventriculomegaly is seen. Variable sulcal prominence is notable. The   callosal angle appears sharp. There is moderate patchy confluent deep and   periventricular white matter hypoattenuation statistically compatible   with microvascular changes given calcific atherosclerotic disease of the   intracranial arteries.  Scattered mucosal thickening is seen throughout the paranasal sinuses.   The bilateral tympanomastoid cavities are clear. The calvarium is intact.   The imaged orbits are unremarkable.  IMPRESSION: No acute intracranial hemorrhage, mass effect, or shift of   the midline structures.  Moderate severity chronic white matter microvascular type changes.             #Discharge: do not delete    URSULA ACKERMAN is a 69M with PMHx of AUD, ?schizoaffective disorder, CAD, HTN, ?CKD, and DM who initially presented to [facility] ED BIBEMS from shelter for SOB, cough, fever and malaise. Labs significant for lactic acidosis, pre-renal SHIRA, and transaminitis c/w septic shock 2/2 Influenza+ PNA, refractory to fluids, requiring pressors, admitted to MICU, managed with Tamiflu, BCx NGTD, UA neg, UStrep neg, ULeg neg, MRSA neg. POCUS was also without gross consolidation or inflitrate. TTE  unremarkable. Pt was take off pressors and bridged with Midodrine eventually weaned off on , maintaining blood pressure. Hospital course c/b pt reporting dizziness w/ changes in position for which Neuro was consulted, pt was started on fludrocortisone, high dose thiamine, underwent head CT showing chronic microvascular white matter changes, and MRI head suggestive of acute/subacute stroke on posterior circulation, started on aspirin and statin, MARCO ANTONIO negative CTA Neck negative. PT/OT consulted and rec TARYN.    Problem List/Main Diagnoses (system-based):   # Cerebellar stroke with Dizziness and Ataxia  Patient c/o dizziness x 2 weeks. Per patient's PCP he has been chronically been c/o dizziness x months and has been having gait difficulties  MRI S/o acute/subacute stroke in posterior circulation. MRA neck and CTA head unremarkable  vEEG read from : no evidence of seizures  TTE negative for PFO (normal biventricular fxn).  Lipid panel (Total: 85 HDL: 31 LDL: 36)   - c/w aspirin and statin 80mg qhs  - c/w meclizine 25mg q12h PRN   - c/w thiamine 100mg qd.  Ideally would rec outpatient cardiac monitoring if social situation allows  D/C NIHSS 0    # Septic Shock   # Acute Hypoxic Respiratory Failure due to Pneumonia due to influenza A virus.   Patient met sepsis criteria on admission (HR >90, RR >20). RVP +influenza A. UA neg for UTI. Lactate elevated to 4.6. Hypotension refractory to 3L fluids.    Lactate cleared s/p 3L fluids. s/p CTX 1g and azithromycin in ED. Procal (after receiving abx) 1.05.  s/p CTX and azithro and tamiflu   - BCx no growth     #SHIRA (acute kidney injury)  Cr 1.3-1.4 in  and per PCP 1.27 in 2024. P/w Cr 2.24, suspect pre-renal iso septic shock and decreased po intake. UA spec grav elevated 1.046, CK 1026, FeNa 0.9%    acute increase in Cr (1.1 -> 1.5), suspect contrast-induced nephropathy iso CTA on . Given 1L fluids.       # Hyponatremia due to SIADH, resolved  # Hypothyroidism -  c/w home dose of levothyroxine 25mcg. (TSH 1.18; T4 4.52)  # Anemia of Chronic Disease   # Alcohol Use Disorder     # Severe Protein Calorie Malnutrition treated with Ensure Plus High Protein BID     New medications: meclizine 25mg q12h PRN, aspirin 81mg qd, atorvastatin 80mg qd    Imagin. CTA 25  IMPRESSION:  Intracranial circulation:  No significant vascular lesion.    2. MR Head w/ wo IV Cont   IMPRESSION:  Small foci of restricted diffusion in the right parietal lobe, right   occipital lobe and left parietal lobe with associated T2 prolongation,   consistent with acute/subacute infarcts. No acute intracranial hemorrhage.  Stable mild ventriculomegaly.  No hemodynamically significant stenosis in the neck.    3. MR Angio Neck No Cont   IMPRESSION:  Small foci of restricted diffusion in the right parietal lobe, right   occipital lobe and left parietal lobe with associated T2 prolongation,   consistent with acute/subacute infarcts. No acute intracranial hemorrhage.  Stable mild ventriculomegaly.  No hemodynamically significant stenosis in the neck.    4. CT HEAD No Cont   FINDINGS: There is no acute intracranial hemorrhage, mass effect, shift   of the midline structures, herniation, extra-axial fluid collection, or   hydrocephalus.  There is diffuse cerebral volume loss with prominence of the sulci,   fissures, and cisternal spaces which is normal for the patient's age.   Mild ventriculomegaly is seen. Variable sulcal prominence is notable. The   callosal angle appears sharp. There is moderate patchy confluent deep and   periventricular white matter hypoattenuation statistically compatible   with microvascular changes given calcific atherosclerotic disease of the   intracranial arteries.  Scattered mucosal thickening is seen throughout the paranasal sinuses.   The bilateral tympanomastoid cavities are clear. The calvarium is intact.   The imaged orbits are unremarkable.  IMPRESSION: No acute intracranial hemorrhage, mass effect, or shift of   the midline structures.  Moderate severity chronic white matter microvascular type changes.

## 2025-01-20 NOTE — PROGRESS NOTE ADULT - PROBLEM SELECTOR PLAN 2
Patient met sepsis criteria on admission (HR >90, RR >20). RVP +influenza A. UA neg for UTI. Lactate elevated to 4.6. Hypotension refractory to 3L fluids.    Lactate cleared s/p 3L fluids. s/p CTX 1g and azithromycin in ED. Procal (after receiving abx) 1.05.  - s/p CTX 1g/Azithro 500mg x 1 (1/12)  - c/w Tamiflu 30mg q24h x5d (1/12 - 1/16) - dose adjusted for CrCl increased to 30mg BID 1/13  - Will keep off abx for now if persistently febrile or if worsening shock will start CTX 2g/Azithro 500mg for CAP coverage  - BCx no growth at 72h  - Urine legionella negative  - MRSA neg  - collect sputum cx if able to produce #Acute hypoxic respiratory failure  #Influenza PNA  CTA: negative for PE but showed bilateral lower lobe mucus plugging as well as bronchial wall thickening.   s/p 3L fluids in [facility] ED, remained hypotensive sBP 80s, MAP low 60. TSH wnl. On Lisinopril/HCTZ outpatient  - Weaned off levophed  - Midodrine increased to 10mg TID 1/13 PM --> increased to 20mg TID 1/15 PM --> weaned down on 1/17 to 15mg TID  - started fludrocortisone 0.1mg qd 1/14 PM --> discontinued 1/16  - Watch HR closely with initiation of midodrine given h/o bradycardia  - Lipid panel (Total: 85 HDL: 31 LDL: 36)   - c/w atorvastatin 20mg at bedtime  - Maintain MAP>65   - s/p 500mL LR bolus this morning   - Hold BP meds  - Weaned to room air as tolerated, Maintain SpO2 goal >92%   - Duoneb q4h standing  - chest PT BID, incentive spirometry, aerobika

## 2025-01-20 NOTE — DISCHARGE NOTE PROVIDER - NSDCCPCAREPLAN_GEN_ALL_CORE_FT
PRINCIPAL DISCHARGE DIAGNOSIS  Diagnosis: Influenza A  Assessment and Plan of Treatment:       SECONDARY DISCHARGE DIAGNOSES  Diagnosis: Hypotension  Assessment and Plan of Treatment:     Diagnosis: Hypoxia  Assessment and Plan of Treatment:     Diagnosis: SHIRA (acute kidney injury)  Assessment and Plan of Treatment:     Diagnosis: Elevated troponin  Assessment and Plan of Treatment:      PRINCIPAL DISCHARGE DIAGNOSIS  Diagnosis: Pneumonia due to influenza A virus  Assessment and Plan of Treatment: You were diagnosed with pneumonia, an infection that inflames air sacs in one or both lungs, which may fill with fluid. With pneumonia, the air sacs may fill with fluid or pus. The infection can be life-threatening to infants, children, and people over 65. Symptoms include cough with phlegm or pus, fever, chills, and difficulty breathing. Antibiotics can treat many forms of pneumonia. Some forms of pneumonia can be prevented by vaccines. You were treated with antibiotics and began to show improvement. You have completed your course of tamiflu. Please follow up with your primary care physician to check for full resolution of this problem.        SECONDARY DISCHARGE DIAGNOSES  Diagnosis: Stroke  Assessment and Plan of Treatment: During this hospital admission, you had an ischemic stroke. During an ischemic stroke, blood stops flowing to part of your brain because of a blockage in the blood vessel. This can damage areas in the brain that control other parts of the body.  Please take your [aspirin and plavix///Eliquis] for blood thinning and Atorvastatin for cholesterol medication/blood vessel protection as prescribed to prevent further strokes. Do not skip doses and do not run low on your medication. If you run low on your medication, please contact your doctor.  You will follow up outpatient with the stroke Nurse Practitioner as scheduled below.  Doing your regular tasks may be difficult after you've had a stroke, but you can learn new ways to manage your daily activities. In fact, doing daily activities may help you to regain muscle strength. Be patient, give yourself time to adjust, and appreciate the progress you make. For example, when showering or bathing, test the water temperature with a hand or foot that was not affected by the stroke, use grab bars, a shower seat, a hand-held showerhead, etc. It is normal to feel fatigue after a stroke, while some days may be worse than others, you will continue to improve.  Call 911 right away if you have any of the following symptoms of another stroke:  B: Balance: Sudden: Dizziness, loss of balance, or a sense of falling, difficulty with coordinating movement  E: Eyes: Sudden double vision or trouble seeing in one or both eyes  F: Face: Sudden uneven face  A: Arms (Legs): Sudden weakness, tingling, or loss of feeling on one side of your face or body  S: Speech: Sudden trouble talking or slurred speech, sudden difficulty understanding others  T: Time: Please call 911 right away and go to the emergency room  •Sudden, severe headache  •Blackouts or seizures      Diagnosis: Sepsis  Assessment and Plan of Treatment: Sepsis is a serious illness that happens when an infection travels through the whole body. Sepsis can happen in anyone, but it is more likely to happen in people who: are older or bedridden, are staying in the hospital or have had recent surgery, have thin tubes such as catheters or IVs in their body, or have a weak infection-fighting system (for example because they are being treated for cancer).  Sepsis can come from an infection in any part of the body, but in your case, it was caused by an infection of the lungs. Treatment includes receiving intravenous fluids and antibiotics.        PRINCIPAL DISCHARGE DIAGNOSIS  Diagnosis: Pneumonia due to influenza A virus  Assessment and Plan of Treatment: You were diagnosed with pneumonia, an infection that inflames air sacs in one or both lungs, which may fill with fluid. With pneumonia, the air sacs may fill with fluid or pus. The infection can be life-threatening to infants, children, and people over 65. Symptoms include cough with phlegm or pus, fever, chills, and difficulty breathing. Antibiotics can treat many forms of pneumonia. Some forms of pneumonia can be prevented by vaccines. You were treated with antibiotics and began to show improvement. You have completed your course of tamiflu. Please follow up with your primary care physician to check for full resolution of this problem.        SECONDARY DISCHARGE DIAGNOSES  Diagnosis: Stroke  Assessment and Plan of Treatment: During this hospital admission, you were found to have a stroke.   ---  - Please take ASPIRIN, STATIN, and MECLIZINE as prescribed.  Call 911 right away if you have any of the following symptoms of another stroke:  B: Balance: Sudden: Dizziness, loss of balance, or a sense of falling, difficulty with coordinating movement  E: Eyes: Sudden double vision or trouble seeing in one or both eyes  F: Face: Sudden uneven face  A: Arms (Legs): Sudden weakness, tingling, or loss of feeling on one side of your face or body  S: Speech: Sudden trouble talking or slurred speech, sudden difficulty understanding others  T: Time: Please call 911 right away and go to the emergency room  •Sudden, severe headache  •Blackouts or seizures      Diagnosis: Sepsis  Assessment and Plan of Treatment: Sepsis is a serious illness that happens when an infection travels through the whole body. Sepsis can happen in anyone, but it is more likely to happen in people who: are older or bedridden, are staying in the hospital or have had recent surgery, have thin tubes such as catheters or IVs in their body, or have a weak infection-fighting system (for example because they are being treated for cancer).  Sepsis can come from an infection in any part of the body, but in your case, it was caused by an infection of the lungs. You were treated with intravenous fluids and antibiotics.

## 2025-01-20 NOTE — DISCHARGE NOTE PROVIDER - NSDCFUADDAPPT_GEN_ALL_CORE_FT
Please follow up with your PCP in 1-2 weeks The scheduled appt is for your neurology appointment.   Please arrive at your appointment 30 minutes prior to the scheduled time, bring your photo ID, list of medications and this discharge paperwork.

## 2025-01-20 NOTE — PROGRESS NOTE ADULT - PROBLEM SELECTOR PLAN 7
Previously prescribed Synthroid 25mcg qd filled 12/12/25 per the  shop   - TSH 1.18; T4 4.52  - c/w home dose of levothyroxine 25mcg Mild Hyponatremia(132) possibly 2/2 SIADH 2/2 influenza; appears euvolemic on exam  - Daily BMP  - If Na continues to downtrend will consider fluid restrictions

## 2025-01-20 NOTE — PROGRESS NOTE ADULT - PROBLEM SELECTOR PLAN 9
H/o Alcohol use disorder   ~2 drinks per week, Last drink 1/10. Per chart review, was empirically treated for Wernicke’s encephalopathy in 2022 at [facility]. Discussed with mary's PCP about AUD and she states he is not forthcoming about his alcohol intake and quantity and frequency is not clear.   - IV thiamine 500mg qd x3d then 100mg qd  - c/w folic acid 1mg qd, multivitamin 1 tablet qd  - utox unable to be run x 2 due to acidic urine  - CIWA protocol will consider phenobarb protocol if CIWA > 8 #MDD  #h/o schizoaffective disorder  Psychiatrist Dr. oCrrales. Per chart review, has previously been prescribed Wellbutrin, risperidone, clonazepam, gabapentin; reports only taking Wellbutrin.   Home med: Wellbutrin SR 200mg BID, iSTOP with clonazepam filled monthly last filled 12/18 and per discussion today with patient's PCP he has most recently been rx'd risperidone, clonazepam, Wellbutrin (per the  shop last filled on 12/19/24)  - c/w buproprion 300mg qd (therapeutic interchange for Wellbutrin 150mg BID)   - c/w risperidone 0.5mg HS  - c/w clonazepam 0.5mg HS

## 2025-01-20 NOTE — PROGRESS NOTE ADULT - SUBJECTIVE AND OBJECTIVE BOX
*****INCOMPLETE NOTE*****    INTERVAL HPI/OVERNIGHT EVENTS:    SUBJECTIVE: Patient seen and examined at bedside, resting comfortably in bed, and does not appear to be in any acute distress. Patient reports    Vital Signs Last 24 Hrs  T(C): 36.9 (20 Jan 2025 05:25), Max: 36.9 (20 Jan 2025 05:25)  T(F): 98.4 (20 Jan 2025 05:25), Max: 98.4 (20 Jan 2025 05:25)  HR: 70 (20 Jan 2025 05:25) (69 - 84)  BP: 120/81 (20 Jan 2025 05:25) (119/84 - 121/80)  BP(mean): 94 (20 Jan 2025 05:25) (94 - 94)  RR: 18 (20 Jan 2025 05:25) (15 - 18)  SpO2: 94% (20 Jan 2025 05:25) (94% - 97%)    Parameters below as of 20 Jan 2025 05:25  Patient On (Oxygen Delivery Method): room air        PHYSICAL EXAM:  General: in no acute distress  Eyes: EOMI intact bilaterally. Anicteric sclerae, moist conjunctivae  HENT: Moist mucous membranes  Neck: Trachea midline, supple  Lungs: CTA B/L. No wheezes, rales, or rhonchi  Cardiovascular: RRR. No murmurs, rubs, or gallops  Abdomen: Soft, non-tender non-distended; No rebound or guarding  Extremities: WWP, No clubbing, cyanosis or edema  Neurological: Alert and oriented  Skin: Warm and dry. No obvious rash     LABS:                        10.3   7.15  )-----------( 304      ( 19 Jan 2025 08:43 )             31.0     01-19    138  |  104  |  18  ----------------------------<  92  3.9   |  24  |  1.10    Ca    8.6      19 Jan 2025 08:43  Phos  2.8     01-19  Mg     1.8     01-19    TPro  5.6[L]  /  Alb  2.8[L]  /  TBili  0.2  /  DBili  x   /  AST  30  /  ALT  33  /  AlkPhos  72  01-19   INTERVAL HPI/OVERNIGHT EVENTS: started on aspirin and statin for acute/subacute stroke per stroke team    SUBJECTIVE: Patient seen and examined at bedside, resting comfortably in bed, and does not appear to be in any acute distress.     Vital Signs Last 24 Hrs  T(C): 36.9 (20 Jan 2025 05:25), Max: 36.9 (20 Jan 2025 05:25)  T(F): 98.4 (20 Jan 2025 05:25), Max: 98.4 (20 Jan 2025 05:25)  HR: 70 (20 Jan 2025 05:25) (69 - 84)  BP: 120/81 (20 Jan 2025 05:25) (119/84 - 121/80)  BP(mean): 94 (20 Jan 2025 05:25) (94 - 94)  RR: 18 (20 Jan 2025 05:25) (15 - 18)  SpO2: 94% (20 Jan 2025 05:25) (94% - 97%)    Parameters below as of 20 Jan 2025 05:25  Patient On (Oxygen Delivery Method): room air        PHYSICAL EXAM:  General: in no acute distress  Eyes: EOMI intact bilaterally. Anicteric sclerae, moist conjunctivae  HENT: Moist mucous membranes  Neck: Trachea midline, supple  Lungs: CTA B/L. No wheezes, rales, or rhonchi  Cardiovascular: RRR. No murmurs, rubs, or gallops  Abdomen: Soft, non-tender non-distended; No rebound or guarding  Extremities: WWP, No clubbing, cyanosis or edema  Neurological: Alert and oriented  Skin: Warm and dry. No obvious rash     LABS:                        10.3   7.15  )-----------( 304      ( 19 Jan 2025 08:43 )             31.0     01-19    138  |  104  |  18  ----------------------------<  92  3.9   |  24  |  1.10    Ca    8.6      19 Jan 2025 08:43  Phos  2.8     01-19  Mg     1.8     01-19    TPro  5.6[L]  /  Alb  2.8[L]  /  TBili  0.2  /  DBili  x   /  AST  30  /  ALT  33  /  AlkPhos  72  01-19

## 2025-01-20 NOTE — PROGRESS NOTE ADULT - PROBLEM SELECTOR PLAN 6
Mild Hyponatremia(132) possibly 2/2 SIADH 2/2 influenza; appears euvolemic on exam  - Daily BMP  - If Na continues to downtrend will consider fluid restrictions , ALT 89. Ddx hypoperfusion iso septic shock vs alcohol hepatitis  - LFTs continue to downtrend  - Hepatitis panel negative   - RUQ not indicated no RUQ tenderness and LFTs almost normal

## 2025-01-20 NOTE — DISCHARGE NOTE PROVIDER - NSDCMRMEDTOKEN_GEN_ALL_CORE_FT
aspirin 81 mg oral tablet, chewable: 1 tab(s) chewed once a day (at bedtime) take 1 tablet once a day  atorvastatin 20 mg oral tablet: 1 tab(s) orally once a day (at bedtime) take 1 tablet once a day  folic acid 1 mg oral tablet: 1 tab(s) orally once a day take 1 tablet once a day  levothyroxine 25 mcg (0.025 mg) oral tablet: 1 tab(s) orally once a day take 1 tablet once a day  meclizine 25 mg oral tablet: 1 tab(s) orally every 12 hours as needed for Dizziness take 1 tablet twice a day as needed for dizziness  Multiple Vitamins oral tablet, chewable: 1 tab(s) orally once a day take 1 tablet once a day  Wellbutrin  mg/12 hours oral tablet, extended release: 1 tab(s) orally every 12 hours   aspirin 81 mg oral tablet, chewable: 1 tab(s) chewed once a day (at bedtime) take 1 tablet once a day  atorvastatin 80 mg oral tablet: 1 tab(s) orally once a day (at bedtime)  clonazePAM 0.5 mg oral tablet: 1 tab(s) orally once a day (at bedtime)  folic acid 1 mg oral tablet: 1 tab(s) orally once a day take 1 tablet once a day  levothyroxine 25 mcg (0.025 mg) oral tablet: 1 tab(s) orally once a day take 1 tablet once a day  meclizine 25 mg oral tablet: 1 tab(s) orally every 12 hours as needed for Dizziness take 1 tablet twice a day as needed for dizziness  Multiple Vitamins oral tablet, chewable: 1 tab(s) orally once a day take 1 tablet once a day  risperiDONE 0.5 mg oral tablet: 1 tab(s) orally once a day (at bedtime)  Wellbutrin  mg/12 hours oral tablet, extended release: 1 tab(s) orally every 12 hours

## 2025-01-20 NOTE — DISCHARGE NOTE PROVIDER - NSDCCPTREATMENT_GEN_ALL_CORE_FT
PRINCIPAL PROCEDURE  Procedure: MR head wo/w con  Findings and Treatment: Small foci of restricted diffusion in the right parietal lobe, right   occipital lobe and left parietal lobe with associated T2 prolongation,   consistent with acute/subacute infarcts. No acute intracranial hemorrhage.  Stable mild ventriculomegaly.  No hemodynamically significant stenosis in the neck.        SECONDARY PROCEDURE  Procedure: Complete transthoracic echocardiography (TTE)  Findings and Treatment: CONCLUSIONS:   1. Normal left and right ventricular size and systolic function.   2. No significant valvular disease.   3. No evidence of pulmonary hypertension.   4. No pericardial effusion.   5. No prior echo is available for comparison.

## 2025-01-20 NOTE — PROGRESS NOTE ADULT - PROBLEM SELECTOR PLAN 3
SHIRA likely prerenal 2/2 hypovolemia (Resolved)  Cr 1.3-1.4 in 2022 and per PCP 1.27 in August 2024. P/w Cr 2.24, suspect pre-renal iso septic shock and decreased po intake. UA spec grav elevated 1.046, CK 1026, FeNa 0.9%   - BMP daily   - Strict I/O Patient met sepsis criteria on admission (HR >90, RR >20). RVP +influenza A. UA neg for UTI. Lactate elevated to 4.6. Hypotension refractory to 3L fluids.    Lactate cleared s/p 3L fluids. s/p CTX 1g and azithromycin in ED. Procal (after receiving abx) 1.05.  - s/p CTX 1g/Azithro 500mg x 1 (1/12)  - c/w Tamiflu 30mg q24h x5d (1/12 - 1/16) - dose adjusted for CrCl increased to 30mg BID 1/13  - Will keep off abx for now if persistently febrile or if worsening shock will start CTX 2g/Azithro 500mg for CAP coverage  - BCx no growth at 72h  - Urine legionella negative  - MRSA neg  - collect sputum cx if able to produce

## 2025-01-20 NOTE — PROGRESS NOTE ADULT - PROBLEM SELECTOR PLAN 4
#Chronic Dizziness and unsteady gait  Patient c/o dizziness x 2 weeks. Per patient's PCP he has been chronically been c/o dizziness x months and has been having gait difficulties  - Head CT w Moderate severity chronic white matter microvascular type changes.  - Neuro consulted, f/u further recs  - vEEG read from 1/16: no evidence of seizures  - f/u MR Head w/wo IV contrast  - f/u MRA Neck w/wo IV contrast  - c/w meclizine 25mg q12h PRN   - c/w thiamine 100mg qd  - consider PT/OT consult  - fu neuro and stroke recs SHIRA likely prerenal 2/2 hypovolemia (Resolved)  Cr 1.3-1.4 in 2022 and per PCP 1.27 in August 2024. P/w Cr 2.24, suspect pre-renal iso septic shock and decreased po intake. UA spec grav elevated 1.046, CK 1026, FeNa 0.9%   - BMP daily   - Strict I/O

## 2025-01-20 NOTE — PROGRESS NOTE ADULT - PROBLEM SELECTOR PLAN 1
#Acute hypoxic respiratory failure  #Influenza PNA  CTA: negative for PE but showed bilateral lower lobe mucus plugging as well as bronchial wall thickening.   s/p 3L fluids in [facility] ED, remained hypotensive sBP 80s, MAP low 60. TSH wnl. On Lisinopril/HCTZ outpatient  - Weaned off levophed  - Midodrine increased to 10mg TID 1/13 PM --> increased to 20mg TID 1/15 PM --> weaned down on 1/17 to 15mg TID  - started fludrocortisone 0.1mg qd 1/14 PM --> discontinued 1/16  - Watch HR closely with initiation of midodrine given h/o bradycardia  - Lipid panel (Total: 85 HDL: 31 LDL: 36)   - c/w atorvastatin 20mg at bedtime  - Maintain MAP>65   - s/p 500mL LR bolus this morning   - Hold BP meds  - Weaned to room air as tolerated, Maintain SpO2 goal >92%   - Duoneb q4h standing  - chest PT BID, incentive spirometry, aerobika c/o positional dizziness   CT unremarkable  MRI S/o acute/subacute stroke in posterior circulation  Plan  - fu neuro and stroke recs  - c/w aspirin and statin 20

## 2025-01-20 NOTE — PROGRESS NOTE ADULT - ASSESSMENT
69M PMH (per chart review) EtOH use disorder, depression, former smoker (20 pack year quit 15 years ago), HTN, hypothyroid, CAD, diabetes who was BIBEMS to [facility] from shelter for SOB, cough, fevers, and malaise, initially admitted to MICU in septic shock 2/2 influenza now improved. course complicated by dizziness, found to have subacute/chronic infacts on MRI -- stable for RMF for continued mgmt

## 2025-01-20 NOTE — PROGRESS NOTE ADULT - SUBJECTIVE AND OBJECTIVE BOX
----- Message from Johann Andre sent at 3/23/2017  5:40 PM EDT -----  Regarding: AYAD Borrero/Kenneth  Pt's wife would like a call back from the nurse about Rx \"Manada\" 21mg. Best contact number [phone]. Neurology Progress Note    Interval History:    Patient was seen and examined, no acute events over night. Still feeling occasional dizziness while standing up and walking, complaining hearing issues.     Medications:  albuterol/ipratropium for Nebulization 3 milliLiter(s) Nebulizer every 6 hours  aspirin  chewable 81 milliGRAM(s) Oral daily  atorvastatin 20 milliGRAM(s) Oral at bedtime  buPROPion XL (24-Hour) . 300 milliGRAM(s) Oral daily  cholecalciferol 1000 Unit(s) Oral daily  clonazePAM  Tablet 0.5 milliGRAM(s) Oral at bedtime  dextrose 5%. 1000 milliLiter(s) IV Continuous <Continuous>  dextrose 5%. 1000 milliLiter(s) IV Continuous <Continuous>  dextrose 50% Injectable 25 Gram(s) IV Push once  dextrose 50% Injectable 12.5 Gram(s) IV Push once  dextrose 50% Injectable 25 Gram(s) IV Push once  dextrose Oral Gel 15 Gram(s) Oral once PRN  enoxaparin Injectable 40 milliGRAM(s) SubCutaneous every 24 hours  folic acid 1 milliGRAM(s) Oral daily  glucagon  Injectable 1 milliGRAM(s) IntraMuscular once  influenza  Vaccine (HIGH DOSE) 0.5 milliLiter(s) IntraMuscular once  insulin lispro (ADMELOG) corrective regimen sliding scale   SubCutaneous Before meals and at bedtime  levothyroxine 25 MICROGram(s) Oral daily  meclizine 25 milliGRAM(s) Oral every 12 hours PRN  midodrine 5 milliGRAM(s) Oral <User Schedule>  multivitamin  Chewable 1 Tablet(s) Oral daily  polyethylene glycol 3350 17 Gram(s) Oral daily  risperiDONE   Tablet 0.5 milliGRAM(s) Oral at bedtime  senna 2 Tablet(s) Oral at bedtime  thiamine 100 milliGRAM(s) Oral daily      Vital Signs Last 24 Hrs  T(C): 36.6 (20 Jan 2025 09:19), Max: 36.9 (20 Jan 2025 05:25)  T(F): 97.9 (20 Jan 2025 09:19), Max: 98.4 (20 Jan 2025 05:25)  HR: 69 (20 Jan 2025 09:19) (69 - 84)  BP: 113/84 (20 Jan 2025 09:19) (113/84 - 120/82)  BP(mean): 94 (20 Jan 2025 05:25) (94 - 94)  RR: 18 (20 Jan 2025 09:19) (18 - 18)  SpO2: 94% (20 Jan 2025 09:19) (94% - 97%)    Parameters below as of 20 Jan 2025 05:25  Patient On (Oxygen Delivery Method): room air        NEUROLOGICAL EXAMINATION:  GENERAL:  Appearance is consistent with chronologic age.   COGNITION/LANGUAGE:  Awake, alert, and oriented to person, place, time and date.  Fluent, intact comprehension, repetition, naming. Nondysarthric.    CRANIAL NERVES:   - Eyes:  Visual acuity intact. Pupils equal round and reactive, no RAPD. EOMI w/o nystagmus, skew or reported double vision. Normal visual field on confrontation. No ptosis/weakness of eyelid closure.   - Face:  Facial sensation normal V1 - 3, no facial asymmetry.    - Ears/Nose/Throat:  Hearing grossly intact b/l to finger rub.  Palate elevates midline.  Tongue and uvula midline.   MOTOR EXAM:  (R/L) 5/5 UE; 5/5 LE.  No observable drift. Normal tone and bulk. No tenderness, twitching, tremors or involuntary movements.  SENSORY EXAM:  Intact to light touch and pinprick, in all extremities.  REFLEXES:   1+ b/l biceps, triceps, patella and achilles.  Plantar response downgoing b/l.  Jaw jerk, Mazin, clonus absent.  CEREBELLUM:  Finger to nose/Heel to knee and shin intact.  No dysmetria.    GAIT: narrow based and normal.  Romberg: negative.   NIHSS: 0    Labs:  CBC Full  -  ( 19 Jan 2025 08:43 )  WBC Count : 7.15 K/uL  RBC Count : 3.20 M/uL  Hemoglobin : 10.3 g/dL  Hematocrit : 31.0 %  Platelet Count - Automated : 304 K/uL  Mean Cell Volume : 96.9 fl  Mean Cell Hemoglobin : 32.2 pg  Mean Cell Hemoglobin Concentration : 33.2 g/dL  Auto Neutrophil # : 2.10 K/uL  Auto Lymphocyte # : 3.53 K/uL  Auto Monocyte # : 1.04 K/uL  Auto Eosinophil # : 0.42 K/uL  Auto Basophil # : 0.03 K/uL  Auto Neutrophil % : 29.4 %  Auto Lymphocyte % : 49.4 %  Auto Monocyte % : 14.5 %  Auto Eosinophil % : 5.9 %  Auto Basophil % : 0.4 %    01-20    139  |  103  |  22  ----------------------------<  85  4.1   |  25  |  1.07    Ca    8.4      20 Jan 2025 08:03  Phos  2.9     01-20  Mg     2.0     01-20    TPro  5.6[L]  /  Alb  2.8[L]  /  TBili  0.2  /  DBili  x   /  AST  30  /  ALT  33  /  AlkPhos  72  01-19    LIVER FUNCTIONS - ( 19 Jan 2025 08:43 )  Alb: 2.8 g/dL / Pro: 5.6 g/dL / ALK PHOS: 72 U/L / ALT: 33 U/L / AST: 30 U/L / GGT: x             Urinalysis Basic - ( 20 Jan 2025 08:03 )    Color: x / Appearance: x / SG: x / pH: x  Gluc: 85 mg/dL / Ketone: x  / Bili: x / Urobili: x   Blood: x / Protein: x / Nitrite: x   Leuk Esterase: x / RBC: x / WBC x   Sq Epi: x / Non Sq Epi: x / Bacteria: x        RADIOLOGY & ADDITIONAL TESTS:

## 2025-01-21 ENCOUNTER — RESULT REVIEW (OUTPATIENT)
Age: 70
End: 2025-01-21

## 2025-01-21 PROCEDURE — 93312 ECHO TRANSESOPHAGEAL: CPT | Mod: 26

## 2025-01-21 PROCEDURE — 70496 CT ANGIOGRAPHY HEAD: CPT | Mod: 26

## 2025-01-21 PROCEDURE — 99233 SBSQ HOSP IP/OBS HIGH 50: CPT | Mod: GC

## 2025-01-21 RX ORDER — IPRATROPIUM BROMIDE AND ALBUTEROL SULFATE .5; 2.5 MG/3ML; MG/3ML
3 SOLUTION RESPIRATORY (INHALATION) EVERY 6 HOURS
Refills: 0 | Status: DISCONTINUED | OUTPATIENT
Start: 2025-01-21 | End: 2025-01-24

## 2025-01-21 RX ORDER — RISPERIDONE 3 MG
1 TABLET ORAL
Qty: 0 | Refills: 0 | DISCHARGE
Start: 2025-01-21

## 2025-01-21 RX ORDER — CLONAZEPAM 2 MG
1 TABLET ORAL
Qty: 0 | Refills: 0 | DISCHARGE
Start: 2025-01-21

## 2025-01-21 RX ORDER — ATORVASTATIN CALCIUM 80 MG/1
1 TABLET, FILM COATED ORAL
Qty: 90 | Refills: 3
Start: 2025-01-21 | End: 2026-01-15

## 2025-01-21 RX ORDER — ATORVASTATIN CALCIUM 80 MG/1
80 TABLET, FILM COATED ORAL AT BEDTIME
Refills: 0 | Status: DISCONTINUED | OUTPATIENT
Start: 2025-01-21 | End: 2025-01-24

## 2025-01-21 RX ORDER — LACTULOSE 10 G/15 ML
10 SOLUTION, ORAL ORAL ONCE
Refills: 0 | Status: COMPLETED | OUTPATIENT
Start: 2025-01-21 | End: 2025-01-21

## 2025-01-21 RX ADMIN — Medication 0.5 MILLIGRAM(S): at 21:12

## 2025-01-21 RX ADMIN — Medication 1 TABLET(S): at 11:40

## 2025-01-21 RX ADMIN — Medication 100 MILLIGRAM(S): at 11:39

## 2025-01-21 RX ADMIN — LEVOTHYROXINE SODIUM 25 MICROGRAM(S): 25 TABLET ORAL at 07:09

## 2025-01-21 RX ADMIN — BUPROPION HYDROCHLORIDE 300 MILLIGRAM(S): 150 TABLET, EXTENDED RELEASE ORAL at 11:39

## 2025-01-21 RX ADMIN — Medication 2 TABLET(S): at 21:13

## 2025-01-21 RX ADMIN — Medication 1000 UNIT(S): at 11:39

## 2025-01-21 RX ADMIN — ATORVASTATIN CALCIUM 80 MILLIGRAM(S): 80 TABLET, FILM COATED ORAL at 21:12

## 2025-01-21 RX ADMIN — IPRATROPIUM BROMIDE AND ALBUTEROL SULFATE 3 MILLILITER(S): .5; 2.5 SOLUTION RESPIRATORY (INHALATION) at 07:09

## 2025-01-21 RX ADMIN — Medication 1 MILLIGRAM(S): at 11:39

## 2025-01-21 RX ADMIN — ASPIRIN 81 MILLIGRAM(S): 81 TABLET, COATED ORAL at 11:40

## 2025-01-21 NOTE — PROGRESS NOTE ADULT - SUBJECTIVE AND OBJECTIVE BOX
Neurology Stroke Progress Note    INTERVAL HPI/OVERNIGHT EVENTS:  Patient seen and examined. Pt states he feels well, no acute complaints.     MEDICATIONS  (STANDING):  aspirin  chewable 81 milliGRAM(s) Oral daily  atorvastatin 20 milliGRAM(s) Oral at bedtime  buPROPion XL (24-Hour) . 300 milliGRAM(s) Oral daily  cholecalciferol 1000 Unit(s) Oral daily  clonazePAM  Tablet 0.5 milliGRAM(s) Oral at bedtime  dextrose 5%. 1000 milliLiter(s) (50 mL/Hr) IV Continuous <Continuous>  dextrose 5%. 1000 milliLiter(s) (100 mL/Hr) IV Continuous <Continuous>  dextrose 50% Injectable 12.5 Gram(s) IV Push once  dextrose 50% Injectable 25 Gram(s) IV Push once  dextrose 50% Injectable 25 Gram(s) IV Push once  enoxaparin Injectable 40 milliGRAM(s) SubCutaneous every 24 hours  folic acid 1 milliGRAM(s) Oral daily  glucagon  Injectable 1 milliGRAM(s) IntraMuscular once  influenza  Vaccine (HIGH DOSE) 0.5 milliLiter(s) IntraMuscular once  insulin lispro (ADMELOG) corrective regimen sliding scale   SubCutaneous Before meals and at bedtime  levothyroxine 25 MICROGram(s) Oral daily  multivitamin  Chewable 1 Tablet(s) Oral daily  polyethylene glycol 3350 17 Gram(s) Oral daily  risperiDONE   Tablet 0.5 milliGRAM(s) Oral at bedtime  senna 2 Tablet(s) Oral at bedtime  thiamine 100 milliGRAM(s) Oral daily    MEDICATIONS  (PRN):  albuterol/ipratropium for Nebulization 3 milliLiter(s) Nebulizer every 6 hours PRN Shortness of Breath and/or Wheezing  dextrose Oral Gel 15 Gram(s) Oral once PRN Blood Glucose LESS THAN 70 milliGRAM(s)/deciliter  meclizine 25 milliGRAM(s) Oral every 12 hours PRN Dizziness      Allergies    No Known Allergies    Intolerances        Vital Signs Last 24 Hrs  T(C): 36.4 (21 Jan 2025 09:11), Max: 36.5 (20 Jan 2025 20:48)  T(F): 97.5 (21 Jan 2025 09:11), Max: 97.7 (20 Jan 2025 20:48)  HR: 85 (21 Jan 2025 09:11) (68 - 85)  BP: 117/86 (21 Jan 2025 09:11) (110/83 - 117/86)  BP(mean): --  RR: 18 (21 Jan 2025 09:11) (17 - 18)  SpO2: 97% (21 Jan 2025 09:11) (95% - 98%)    Parameters below as of 21 Jan 2025 09:11  Patient On (Oxygen Delivery Method): room air        Physical exam:  General: No acute distress, awake and alert  Eyes: Anicteric sclerae, moist conjunctivae, see below for CNs  Neck: trachea midline,  Cardiovascular: Regular rate and rhythm    Neurologic:  -Mental status: Awake, alert, oriented to person, place, and time. Speech is fluent with intact naming, repetition, and comprehension, no dysarthria. Recent and remote memory intact. Follows commands. Attention/concentration intact. Fund of knowledge appropriate.  -Cranial nerves:   II: Visual fields are full to confrontation.  III, IV, VI: Extraocular movements are intact without nystagmus.   VII: Face is symmetric   Motor: Normal bulk and tone. No pronator drift. Strength bilateral upper extremity 5/5, bilateral lower extremities 5/5.  Sensation: Intact to light touch bilaterally. No neglect or extinction on double simultaneous testing.  Coordination: No dysmetria on finger-to-nose    LABS:    01-20    139  |  103  |  22  ----------------------------<  85  4.1   |  25  |  1.07    Ca    8.4      20 Jan 2025 08:03  Phos  2.9     01-20  Mg     2.0     01-20        Urinalysis Basic - ( 20 Jan 2025 08:03 )    Color: x / Appearance: x / SG: x / pH: x  Gluc: 85 mg/dL / Ketone: x  / Bili: x / Urobili: x   Blood: x / Protein: x / Nitrite: x   Leuk Esterase: x / RBC: x / WBC x   Sq Epi: x / Non Sq Epi: x / Bacteria: x        RADIOLOGY & ADDITIONAL TESTS:

## 2025-01-21 NOTE — PROGRESS NOTE ADULT - PROBLEM SELECTOR PLAN 2
#Acute hypoxic respiratory failure  #Influenza PNA  CTA: negative for PE but showed bilateral lower lobe mucus plugging as well as bronchial wall thickening.   s/p 3L fluids in [facility] ED, remained hypotensive sBP 80s, MAP low 60. TSH wnl. On Lisinopril/HCTZ outpatient  - Weaned off levophed  - Midodrine increased to 10mg TID 1/13 PM --> increased to 20mg TID 1/15 PM --> weaned down on 1/17 to 15mg TID  - started fludrocortisone 0.1mg qd 1/14 PM --> discontinued 1/16  - Watch HR closely with initiation of midodrine given h/o bradycardia  - Lipid panel (Total: 85 HDL: 31 LDL: 36)   - c/w atorvastatin 20mg at bedtime  - Maintain MAP>65   - s/p 500mL LR bolus this morning   - Hold BP meds  - Weaned to room air as tolerated, Maintain SpO2 goal >92%   - Duoneb q4h standing  - chest PT BID, incentive spirometry, aerobika

## 2025-01-21 NOTE — PROGRESS NOTE ADULT - ASSESSMENT
karina who was BIBEMS to [facility] from shelter for SOB, cough, fevers, and malaise admitted to MICU for septic shock 2/2 influenza. Neurology consulted for dizziness. His MRI was notable for small strokes - right parietal, right occipital, and left parietal lobes.    1)Secondary stroke prevention  - continue ASA 81mg  - start Atorvastatin 80mg PO daily    2) Stroke risk factors  - A1C: 6.4  - LDL: 36  - HTN    3) Further management  -obtain MARCO ANTONIO  -obtain CTA head   -consider outpatient heart monitor - no afib noted while pt was on tele prior  -MRA neck with no vessel disease in the neck  - recommend q8hr general neuro checks  - may need outpt neurology follow up  - provide stroke education    DVT prophylaxis   -Lovenox SQ and SCDs    Discussed with Neurology Attending Dr. Mcguire

## 2025-01-21 NOTE — PROGRESS NOTE ADULT - ASSESSMENT
69M PMH (per chart review) EtOH use disorder, depression, former smoker (20 pack year quit 15 years ago), HTN, hypothyroid, CAD, diabetes who was BIBEMS to [facility] from shelter for SOB, cough, fevers, and malaise, initially admitted to MICU in septic shock 2/2 influenza now improved. course complicated by dizziness, found to have subacute/chronic infacts on MRI -- stable for RMF for continued mgmt

## 2025-01-21 NOTE — PROGRESS NOTE ADULT - PROBLEM SELECTOR PLAN 5
#Chronic Dizziness and unsteady gait  Patient c/o dizziness x 2 weeks. Per patient's PCP he has been chronically been c/o dizziness x months and has been having gait difficulties  - Head CT w Moderate severity chronic white matter microvascular type changes.  - Neuro consulted, f/u further recs  - vEEG read from 1/16: no evidence of seizures  - f/u MR Head w/wo IV contrast  - f/u MRA Neck w/wo IV contrast  - c/w meclizine 25mg q12h PRN   - c/w thiamine 100mg qd  - consider PT/OT consult  - fu neuro and stroke recs

## 2025-01-21 NOTE — PROGRESS NOTE ADULT - PROBLEM SELECTOR PLAN 10
H/o Alcohol use disorder   ~2 drinks per week, Last drink 1/10. Per chart review, was empirically treated for Wernicke’s encephalopathy in 2022 at [place]. Discussed with mary's PCP about AUD and she states he is not forthcoming about his alcohol intake and quantity and frequency is not clear.   - IV thiamine 500mg qd x3d then 100mg qd  - c/w folic acid 1mg qd, multivitamin 1 tablet qd  - utox unable to be run x 2 due to acidic urine  - CIWA protocol will consider phenobarb protocol if CIWA > 8

## 2025-01-21 NOTE — PROGRESS NOTE ADULT - SUBJECTIVE AND OBJECTIVE BOX
*****INCOMPLETE NOTE*****    INTERVAL HPI/OVERNIGHT EVENTS:    SUBJECTIVE: Patient seen and examined at bedside, resting comfortably in bed, and does not appear to be in any acute distress. Patient reports    Vital Signs Last 24 Hrs  T(C): 36.4 (21 Jan 2025 06:03), Max: 36.6 (20 Jan 2025 09:19)  T(F): 97.5 (21 Jan 2025 06:03), Max: 97.9 (20 Jan 2025 09:19)  HR: 68 (21 Jan 2025 06:03) (68 - 76)  BP: 117/86 (21 Jan 2025 06:03) (110/83 - 117/86)  BP(mean): --  RR: 18 (21 Jan 2025 06:03) (17 - 18)  SpO2: 95% (21 Jan 2025 06:03) (94% - 98%)    Parameters below as of 21 Jan 2025 06:03  Patient On (Oxygen Delivery Method): room air        PHYSICAL EXAM:  General: in no acute distress  Eyes: EOMI intact bilaterally. Anicteric sclerae, moist conjunctivae  HENT: Moist mucous membranes  Neck: Trachea midline, supple  Lungs: CTA B/L. No wheezes, rales, or rhonchi  Cardiovascular: RRR. No murmurs, rubs, or gallops  Abdomen: Soft, non-tender non-distended; No rebound or guarding  Extremities: WWP, No clubbing, cyanosis or edema  Neurological: Alert and oriented  Skin: Warm and dry. No obvious rash     LABS:                        10.3   7.15  )-----------( 304      ( 19 Jan 2025 08:43 )             31.0     01-20    139  |  103  |  22  ----------------------------<  85  4.1   |  25  |  1.07    Ca    8.4      20 Jan 2025 08:03  Phos  2.9     01-20  Mg     2.0     01-20    TPro  5.6[L]  /  Alb  2.8[L]  /  TBili  0.2  /  DBili  x   /  AST  30  /  ALT  33  /  AlkPhos  72  01-19   INTERVAL HPI/OVERNIGHT EVENTS: feliz    SUBJECTIVE: Patient seen and examined at bedside, resting comfortably in bed, and does not appear to be in any acute distress.     Vital Signs Last 24 Hrs  T(C): 36.4 (21 Jan 2025 06:03), Max: 36.6 (20 Jan 2025 09:19)  T(F): 97.5 (21 Jan 2025 06:03), Max: 97.9 (20 Jan 2025 09:19)  HR: 68 (21 Jan 2025 06:03) (68 - 76)  BP: 117/86 (21 Jan 2025 06:03) (110/83 - 117/86)  BP(mean): --  RR: 18 (21 Jan 2025 06:03) (17 - 18)  SpO2: 95% (21 Jan 2025 06:03) (94% - 98%)    Parameters below as of 21 Jan 2025 06:03  Patient On (Oxygen Delivery Method): room air        PHYSICAL EXAM:  General: in no acute distress  Eyes: EOMI intact bilaterally. Anicteric sclerae, moist conjunctivae  HENT: Moist mucous membranes  Neck: Trachea midline, supple  Lungs: CTA B/L. No wheezes, rales, or rhonchi  Cardiovascular: RRR. No murmurs, rubs, or gallops  Abdomen: Soft, non-tender non-distended; No rebound or guarding  Extremities: WWP, No clubbing, cyanosis or edema  Neurological: Alert and oriented  Skin: Warm and dry. No obvious rash     LABS:                        10.3   7.15  )-----------( 304      ( 19 Jan 2025 08:43 )             31.0     01-20    139  |  103  |  22  ----------------------------<  85  4.1   |  25  |  1.07    Ca    8.4      20 Jan 2025 08:03  Phos  2.9     01-20  Mg     2.0     01-20    TPro  5.6[L]  /  Alb  2.8[L]  /  TBili  0.2  /  DBili  x   /  AST  30  /  ALT  33  /  AlkPhos  72  01-19

## 2025-01-21 NOTE — PROGRESS NOTE ADULT - ATTENDING COMMENTS
68 yo man admitted to the hospital with septic shock  , Acute Hypoxic Respiratory Failure due to Influenza Pneumonia , he was weaned of pressors and supplemental o2 and transferred to [facility]     Hospital Course Complicated Dizziness and MRI revealed Cerebellar Stroke   MRA Neck and CTA Head  Negative for Lvo     MARCO ANTONIO negative for DIDIER Clot, R to Left Shunt     Vitals labs and Imaging Reviewed and Independently Interpreted     Impression and plan  # Cerebellar Stroke with Dizziness and Ataxia    - No Lvo on CTA and MRA   - MARCO ANTONIO negative for DIDIER clot and R to L shunt   - Aspirin + Atorvastatin       # Septic Shock - of Pressors , weaned of midodrine and Florinef   # Acute Hypoxic Respiratory Failure - now on Room Air   # Influenza Pneumonia - s/p Tamiflu   # SHIRA - Prerenal   # Hyponatremia due to SIADH   # Hypothyroidism  # Alcohol Use disorder - Outside the Window for Withdrawal   # Anemia of Chronic Disease

## 2025-01-21 NOTE — PROGRESS NOTE ADULT - PROBLEM SELECTOR PLAN 1
c/o positional dizziness   CT unremarkable  MRI S/o acute/subacute stroke in posterior circulation  Plan  - fu neuro and stroke recs  - c/w aspirin and statin 20 c/o positional dizziness   MRA neck unremarkable  MRI S/o acute/subacute stroke in posterior circulation  Plan  - fu neuro and stroke recs  - c/w aspirin and statin 80mg  - fu TTE  - FU CTA head

## 2025-01-22 PROCEDURE — 99232 SBSQ HOSP IP/OBS MODERATE 35: CPT | Mod: GC

## 2025-01-22 RX ORDER — CLONAZEPAM 2 MG
0.5 TABLET ORAL AT BEDTIME
Refills: 0 | Status: DISCONTINUED | OUTPATIENT
Start: 2025-01-22 | End: 2025-01-24

## 2025-01-22 RX ADMIN — ENOXAPARIN SODIUM 40 MILLIGRAM(S): 100 INJECTION SUBCUTANEOUS at 12:15

## 2025-01-22 RX ADMIN — POLYETHYLENE GLYCOL 3350 17 GRAM(S): 17 POWDER, FOR SOLUTION ORAL at 12:16

## 2025-01-22 RX ADMIN — LEVOTHYROXINE SODIUM 25 MICROGRAM(S): 25 TABLET ORAL at 05:35

## 2025-01-22 RX ADMIN — ATORVASTATIN CALCIUM 80 MILLIGRAM(S): 80 TABLET, FILM COATED ORAL at 21:47

## 2025-01-22 RX ADMIN — Medication 100 MILLIGRAM(S): at 12:14

## 2025-01-22 RX ADMIN — BUPROPION HYDROCHLORIDE 300 MILLIGRAM(S): 150 TABLET, EXTENDED RELEASE ORAL at 12:15

## 2025-01-22 RX ADMIN — Medication 2 TABLET(S): at 21:48

## 2025-01-22 RX ADMIN — Medication 0.5 MILLIGRAM(S): at 21:47

## 2025-01-22 RX ADMIN — Medication 1 TABLET(S): at 12:15

## 2025-01-22 RX ADMIN — Medication 1 MILLIGRAM(S): at 12:14

## 2025-01-22 RX ADMIN — ASPIRIN 81 MILLIGRAM(S): 81 TABLET, COATED ORAL at 12:15

## 2025-01-22 RX ADMIN — Medication 1000 UNIT(S): at 12:14

## 2025-01-22 NOTE — PROGRESS NOTE ADULT - PROBLEM SELECTOR PLAN 1
c/o positional dizziness   MRA neck unremarkable  MRI S/o acute/subacute stroke in posterior circulation  TTE negative and CTA head negative  TTE:  1. Normal left and right ventricular size and systolic function.   2. No LA/RA/DIDIER/RAA thrombus seen.   3. Injectionof agitated saline via a peripheral vein reveals no   evidence of a right-to-left shunt.   4. Mild mitral regurgitation.   5. There is minimal plaque seen in the visualized portion of the   descending aorta. There is no significant plaque seen in thevisualized   portion of the aortic arch.   6. No pericardial effusion.  CTA head negative  Plan  - fu neuro and stroke recs  - c/w aspirin and statin 80mg

## 2025-01-22 NOTE — PROGRESS NOTE ADULT - PROBLEM SELECTOR PLAN 2
#Acute hypoxic respiratory failure  #Influenza PNA  RESOLVED  CTA: negative for PE but showed bilateral lower lobe mucus plugging as well as bronchial wall thickening.   s/p 3L fluids in [facility] ED, remained hypotensive sBP 80s, MAP low 60. TSH wnl. On Lisinopril/HCTZ outpatient  - Weaned off levophed  - Midodrine increased to 10mg TID 1/13 PM --> increased to 20mg TID 1/15 PM --> weaned down on 1/17 to 15mg TID -> now off of midodrine  - started fludrocortisone 0.1mg qd 1/14 PM --> discontinued 1/16  - Watch HR closely with initiation of midodrine given h/o bradycardia  - Lipid panel (Total: 85 HDL: 31 LDL: 36)   - c/w atorvastatin 20mg at bedtime  - Maintain MAP>65   - Hold BP meds  - Weaned to room air as tolerated, Maintain SpO2 goal >92%   - Duoneb q4h standing  - chest PT BID, incentive spirometry, aerobika

## 2025-01-22 NOTE — PROGRESS NOTE ADULT - PROBLEM SELECTOR PLAN 10
H/o Alcohol use disorder   ~2 drinks per week, Last drink 1/10. Per chart review, was empirically treated for Wernicke’s encephalopathy in 2022 at [place]. Discussed with mary's PCP about AUD and she states he is not forthcoming about his alcohol intake and quantity and frequency is not clear.   - IV thiamine 500mg qd x3d then 100mg qd  - c/w folic acid 1mg qd, multivitamin 1 tablet qd  - utox unable to be run x 2 due to acidic urine  -D/C  [facility] protocol 1/22

## 2025-01-22 NOTE — PROGRESS NOTE ADULT - SUBJECTIVE AND OBJECTIVE BOX
Internal Medicine Progress Note  Iván Valdez PGY1     OVERNIGHT EVENTS/INTERVAL HPI: STEVENSON.     Subjective: resting comfortably, no acute complaints.    OBJECTIVE:  Vital Signs Last 24 Hrs  T(C): 36.9 (22 Jan 2025 09:00), Max: 36.9 (22 Jan 2025 09:00)  T(F): 98.4 (22 Jan 2025 09:00), Max: 98.4 (22 Jan 2025 09:00)  HR: 69 (22 Jan 2025 09:00) (69 - 78)  BP: 100/67 (22 Jan 2025 09:00) (100/67 - 119/85)  BP(mean): 86 (22 Jan 2025 06:00) (86 - 86)  RR: 18 (22 Jan 2025 09:00) (17 - 18)  SpO2: 94% (22 Jan 2025 09:00) (93% - 97%)    Parameters below as of 22 Jan 2025 09:00  Patient On (Oxygen Delivery Method): room air      I&O's Detail    PHYSICAL EXAM:  General: in no acute distress  Eyes: EOMI intact bilaterally. Anicteric sclerae, moist conjunctivae  HENT: Moist mucous membranes  Neck: Trachea midline, supple  Lungs: CTA B/L. No wheezes, rales, or rhonchi  Cardiovascular: RRR. No murmurs, rubs, or gallops  Abdomen: Soft, non-tender non-distended; No rebound or guarding  Extremities: WWP, No clubbing, cyanosis or edema  Neurological: Alert and oriented  Skin: Warm and dry. No obvious rash       Medications:  MEDICATIONS  (STANDING):  aspirin  chewable 81 milliGRAM(s) Oral daily  atorvastatin 80 milliGRAM(s) Oral at bedtime  buPROPion XL (24-Hour) . 300 milliGRAM(s) Oral daily  cholecalciferol 1000 Unit(s) Oral daily  clonazePAM  Tablet 0.5 milliGRAM(s) Oral at bedtime  dextrose 5%. 1000 milliLiter(s) (50 mL/Hr) IV Continuous <Continuous>  dextrose 5%. 1000 milliLiter(s) (100 mL/Hr) IV Continuous <Continuous>  dextrose 50% Injectable 25 Gram(s) IV Push once  dextrose 50% Injectable 12.5 Gram(s) IV Push once  dextrose 50% Injectable 25 Gram(s) IV Push once  enoxaparin Injectable 40 milliGRAM(s) SubCutaneous every 24 hours  folic acid 1 milliGRAM(s) Oral daily  glucagon  Injectable 1 milliGRAM(s) IntraMuscular once  influenza  Vaccine (HIGH DOSE) 0.5 milliLiter(s) IntraMuscular once  insulin lispro (ADMELOG) corrective regimen sliding scale   SubCutaneous Before meals and at bedtime  levothyroxine 25 MICROGram(s) Oral daily  multivitamin  Chewable 1 Tablet(s) Oral daily  polyethylene glycol 3350 17 Gram(s) Oral daily  risperiDONE   Tablet 0.5 milliGRAM(s) Oral at bedtime  senna 2 Tablet(s) Oral at bedtime  thiamine 100 milliGRAM(s) Oral daily    MEDICATIONS  (PRN):  albuterol/ipratropium for Nebulization 3 milliLiter(s) Nebulizer every 6 hours PRN Shortness of Breath and/or Wheezing  dextrose Oral Gel 15 Gram(s) Oral once PRN Blood Glucose LESS THAN 70 milliGRAM(s)/deciliter  meclizine 25 milliGRAM(s) Oral every 12 hours PRN Dizziness      Labs:                    Radiology: Reviewed

## 2025-01-22 NOTE — PROGRESS NOTE ADULT - PROBLEM SELECTOR PLAN 4
(RESOLVED)   SHIRA likely prerenal 2/2 hypovolemia (Resolved)  Cr 1.3-1.4 in 2022 and per PCP 1.27 in August 2024. P/w Cr 2.24, suspect pre-renal iso septic shock and decreased po intake. UA spec grav elevated 1.046, CK 1026, FeNa 0.9%   - Strict I/O

## 2025-01-22 NOTE — PROGRESS NOTE ADULT - PROBLEM SELECTOR PLAN 5
#Chronic Dizziness and unsteady gait  Patient c/o dizziness x 2 weeks. Per patient's PCP he has been chronically been c/o dizziness x months and has been having gait difficulties  - Head CT w Moderate severity chronic white matter microvascular type changes.  - vEEG read from 1/16: no evidence of seizures  - f/u MRA Neck w/wo IV contrast negative  - c/w meclizine 25mg q12h PRN   - c/w thiamine 100mg qd

## 2025-01-22 NOTE — PROGRESS NOTE ADULT - ATTENDING COMMENTS
68 yo man admitted to the hospital with septic shock  , Acute Hypoxic Respiratory Failure due to Influenza Pneumonia , he was weaned of pressors and supplemental o2 and transferred to [facility] Course Complicated Dizziness and MRI revealed Cerebellar Stroke   MRA Neck and CTA Head  Negative for Lvo     MARCO ANTONIO negative for DIDIER Clot, R to Left Shunt     Vitals labs and Imaging Reviewed and Independently Interpreted     Impression and plan  # Cerebellar Stroke with Dizziness and Ataxia    - No Lvo on CTA and MRA   - MARCO ANTONIO negative for DIDIER clot and R to L shunt   - Aspirin + Atorvastatin       # Septic Shock - of Pressors , weaned of midodrine and Florinef   # Acute Hypoxic Respiratory Failure - now on Room Air   # Influenza Pneumonia - s/p Tamiflu   # SHIRA - Prerenal   # Hyponatremia due to SIADH   # Hypothyroidism  # Alcohol Use disorder - Outside the Window for Withdrawal   # Anemia of Chronic Disease     Dispo : Medically Ready for Discharge to [facility]

## 2025-01-22 NOTE — PROGRESS NOTE ADULT - ASSESSMENT
69M PMH (per chart review) EtOH use disorder, depression, former smoker (20 pack year quit 15 years ago), HTN, hypothyroid, CAD, diabetes who was BIBEMS to [facility] from shelter for SOB, cough, fevers, and malaise, initially admitted to MICU in septic shock 2/2 influenza now improved. course complicated by dizziness, found to have subacute/chronic infacts on MRI, now optimized by neuro and pending TARYN.

## 2025-01-22 NOTE — PROGRESS NOTE ADULT - PROBLEM SELECTOR PLAN 3
(RESOLVED)  Patient met sepsis criteria on admission (HR >90, RR >20). RVP +influenza A. UA neg for UTI. Lactate elevated to 4.6. Hypotension refractory to 3L fluids.    Lactate cleared s/p 3L fluids. s/p CTX 1g and azithromycin in ED. Procal (after receiving abx) 1.05.  - s/p CTX 1g/Azithro 500mg x 1 (1/12) and s/p Tamiflu 30mg q24h x5d (1/12 - 1/16)   - BCx no growth at 72h  - Urine legionella negative  - MRSA neg

## 2025-01-22 NOTE — PROGRESS NOTE ADULT - PROBLEM SELECTOR PLAN 7
(RESOLVED) Mild Hyponatremia(132) possibly 2/2 SIADH 2/2 influenza; appears euvolemic on exam  - Daily BMP  - If Na continues to downtrend will consider fluid restrictions

## 2025-01-23 ENCOUNTER — TRANSCRIPTION ENCOUNTER (OUTPATIENT)
Age: 70
End: 2025-01-23

## 2025-01-23 LAB
ANION GAP SERPL CALC-SCNC: 12 MMOL/L — SIGNIFICANT CHANGE UP (ref 5–17)
BASOPHILS # BLD AUTO: 0.04 K/UL — SIGNIFICANT CHANGE UP (ref 0–0.2)
BASOPHILS NFR BLD AUTO: 0.5 % — SIGNIFICANT CHANGE UP (ref 0–2)
BUN SERPL-MCNC: 29 MG/DL — HIGH (ref 7–23)
CALCIUM SERPL-MCNC: 8.6 MG/DL — SIGNIFICANT CHANGE UP (ref 8.4–10.5)
CHLORIDE SERPL-SCNC: 100 MMOL/L — SIGNIFICANT CHANGE UP (ref 96–108)
CO2 SERPL-SCNC: 23 MMOL/L — SIGNIFICANT CHANGE UP (ref 22–31)
CREAT ?TM UR-MCNC: 99 MG/DL — SIGNIFICANT CHANGE UP
CREAT SERPL-MCNC: 1.52 MG/DL — HIGH (ref 0.5–1.3)
EGFR: 49 ML/MIN/1.73M2 — LOW
EOSINOPHIL # BLD AUTO: 0.37 K/UL — SIGNIFICANT CHANGE UP (ref 0–0.5)
EOSINOPHIL NFR BLD AUTO: 4.2 % — SIGNIFICANT CHANGE UP (ref 0–6)
GLUCOSE SERPL-MCNC: 93 MG/DL — SIGNIFICANT CHANGE UP (ref 70–99)
HCT VFR BLD CALC: 33.9 % — LOW (ref 39–50)
HGB BLD-MCNC: 11.2 G/DL — LOW (ref 13–17)
IMM GRANULOCYTES NFR BLD AUTO: 0.8 % — SIGNIFICANT CHANGE UP (ref 0–0.9)
LYMPHOCYTES # BLD AUTO: 3.3 K/UL — SIGNIFICANT CHANGE UP (ref 1–3.3)
LYMPHOCYTES # BLD AUTO: 37.8 % — SIGNIFICANT CHANGE UP (ref 13–44)
MAGNESIUM SERPL-MCNC: 1.9 MG/DL — SIGNIFICANT CHANGE UP (ref 1.6–2.6)
MCHC RBC-ENTMCNC: 32 PG — SIGNIFICANT CHANGE UP (ref 27–34)
MCHC RBC-ENTMCNC: 33 G/DL — SIGNIFICANT CHANGE UP (ref 32–36)
MCV RBC AUTO: 96.9 FL — SIGNIFICANT CHANGE UP (ref 80–100)
MONOCYTES # BLD AUTO: 1.13 K/UL — HIGH (ref 0–0.9)
MONOCYTES NFR BLD AUTO: 12.9 % — SIGNIFICANT CHANGE UP (ref 2–14)
NEUTROPHILS # BLD AUTO: 3.82 K/UL — SIGNIFICANT CHANGE UP (ref 1.8–7.4)
NEUTROPHILS NFR BLD AUTO: 43.8 % — SIGNIFICANT CHANGE UP (ref 43–77)
NRBC # BLD: 0 /100 WBCS — SIGNIFICANT CHANGE UP (ref 0–0)
NRBC BLD-RTO: 0 /100 WBCS — SIGNIFICANT CHANGE UP (ref 0–0)
OSMOLALITY UR: 663 MOSM/KG — SIGNIFICANT CHANGE UP (ref 300–900)
PHOSPHATE SERPL-MCNC: 2.9 MG/DL — SIGNIFICANT CHANGE UP (ref 2.5–4.5)
PLATELET # BLD AUTO: 428 K/UL — HIGH (ref 150–400)
POTASSIUM SERPL-MCNC: 4.1 MMOL/L — SIGNIFICANT CHANGE UP (ref 3.5–5.3)
POTASSIUM SERPL-SCNC: 4.1 MMOL/L — SIGNIFICANT CHANGE UP (ref 3.5–5.3)
RBC # BLD: 3.5 M/UL — LOW (ref 4.2–5.8)
RBC # FLD: 13.8 % — SIGNIFICANT CHANGE UP (ref 10.3–14.5)
SODIUM SERPL-SCNC: 135 MMOL/L — SIGNIFICANT CHANGE UP (ref 135–145)
SODIUM UR-SCNC: 138 MMOL/L — SIGNIFICANT CHANGE UP
UUN UR-MCNC: 912 MG/DL — SIGNIFICANT CHANGE UP
WBC # BLD: 8.73 K/UL — SIGNIFICANT CHANGE UP (ref 3.8–10.5)
WBC # FLD AUTO: 8.73 K/UL — SIGNIFICANT CHANGE UP (ref 3.8–10.5)

## 2025-01-23 PROCEDURE — 99232 SBSQ HOSP IP/OBS MODERATE 35: CPT | Mod: GC

## 2025-01-23 RX ORDER — SODIUM CHLORIDE 9 G/ML
1000 INJECTION, SOLUTION INTRAVENOUS ONCE
Refills: 0 | Status: COMPLETED | OUTPATIENT
Start: 2025-01-23 | End: 2025-01-23

## 2025-01-23 RX ADMIN — ATORVASTATIN CALCIUM 80 MILLIGRAM(S): 80 TABLET, FILM COATED ORAL at 21:25

## 2025-01-23 RX ADMIN — Medication 2 TABLET(S): at 21:25

## 2025-01-23 RX ADMIN — Medication 0.5 MILLIGRAM(S): at 23:23

## 2025-01-23 RX ADMIN — Medication 1 TABLET(S): at 13:25

## 2025-01-23 RX ADMIN — Medication 1000 UNIT(S): at 13:25

## 2025-01-23 RX ADMIN — ENOXAPARIN SODIUM 40 MILLIGRAM(S): 100 INJECTION SUBCUTANEOUS at 13:26

## 2025-01-23 RX ADMIN — Medication 0.5 MILLIGRAM(S): at 21:25

## 2025-01-23 RX ADMIN — Medication 100 MILLIGRAM(S): at 13:25

## 2025-01-23 RX ADMIN — LEVOTHYROXINE SODIUM 25 MICROGRAM(S): 25 TABLET ORAL at 06:14

## 2025-01-23 RX ADMIN — Medication 1 MILLIGRAM(S): at 13:25

## 2025-01-23 RX ADMIN — ASPIRIN 81 MILLIGRAM(S): 81 TABLET, COATED ORAL at 13:25

## 2025-01-23 RX ADMIN — SODIUM CHLORIDE 500 MILLILITER(S): 9 INJECTION, SOLUTION INTRAVENOUS at 12:38

## 2025-01-23 RX ADMIN — BUPROPION HYDROCHLORIDE 300 MILLIGRAM(S): 150 TABLET, EXTENDED RELEASE ORAL at 13:25

## 2025-01-23 NOTE — PROGRESS NOTE ADULT - PROBLEM SELECTOR PLAN 8
Previously prescribed Synthroid 25mcg qd filled 12/12/25 per the  shop   - TSH 1.18; T4 4.52  - c/w home dose of levothyroxine 25mcg Anemia likely anemia of chronic disease or from sepsis   Hgb 10.3 s/p 3L fluid resuscitation. Possible h/o pernicious anemia per [facility] chart review.   Folate 6.0, B12 596  - folic acid and MV as above  - Maintain active T&S

## 2025-01-23 NOTE — PROGRESS NOTE ADULT - ATTENDING COMMENTS
68 yo man admitted to the hospital with septic shock  , Acute Hypoxic Respiratory Failure due to Influenza Pneumonia , he was weaned of pressors and supplemental o2 and transferred to [facility]     Hospital Course Complicated Dizziness and MRI revealed Cerebellar Stroke   MRA Neck and CTA Head  Negative for Lvo     MARCO ANTONIO negative for DIDIER Clot, R to Left Shunt     Vitals labs and Imaging Reviewed and Independently Interpreted     Impression and plan  # Cerebellar Stroke with Dizziness and Ataxia    - No Lvo on CTA and MRA   - MARCO ANTONIO negative for DIDIER clot and R to L shunt   - Aspirin + Atorvastatin       # Septic Shock - of Pressors , weaned of midodrine and Florinef   # Acute Hypoxic Respiratory Failure - now on Room Air   # Influenza Pneumonia - s/p Tamiflu   # SHIRA - Prerenal   # Hyponatremia due to SIADH   # Hypothyroidism  # Alcohol Use disorder - Outside the Window for Withdrawal   # Anemia of Chronic Disease     Dispo : Medically Ready for Discharge to Home

## 2025-01-23 NOTE — PROGRESS NOTE ADULT - SUBJECTIVE AND OBJECTIVE BOX
OVERNIGHT EVENTS/INTERVAL HPI: STEVENSON.     Subjective: resting comfortably, no acute complaints.    PHYSICAL EXAM:  General: in no acute distress  Eyes: EOMI intact bilaterally. Anicteric sclerae, moist conjunctivae  HENT: Moist mucous membranes  Neck: Trachea midline, supple  Lungs: CTA B/L. No wheezes, rales, or rhonchi  Cardiovascular: RRR. No murmurs, rubs, or gallops  Abdomen: Soft, non-tender non-distended; No rebound or guarding  Extremities: WWP, No clubbing, cyanosis or edema  Neurological: Alert and oriented  Skin: Warm and dry. No obvious rash     VITAL SIGNS:  T(C): 36.7 (01-23-25 @ 05:26), Max: 36.9 (01-22-25 @ 09:00)  HR: 66 (01-23-25 @ 05:26) (66 - 79)  BP: 114/73 (01-23-25 @ 05:26) (97/71 - 114/73)  RR: 18 (01-23-25 @ 05:26) (17 - 18)  SpO2: 92% (01-23-25 @ 05:26) (92% - 96%)    albuterol/ipratropium for Nebulization 3 milliLiter(s) Nebulizer every 6 hours PRN  aspirin  chewable 81 milliGRAM(s) Oral daily  atorvastatin 80 milliGRAM(s) Oral at bedtime  buPROPion XL (24-Hour) . 300 milliGRAM(s) Oral daily  cholecalciferol 1000 Unit(s) Oral daily  clonazePAM  Tablet 0.5 milliGRAM(s) Oral at bedtime  dextrose 5%. 1000 milliLiter(s) IV Continuous <Continuous>  dextrose 5%. 1000 milliLiter(s) IV Continuous <Continuous>  dextrose 50% Injectable 25 Gram(s) IV Push once  dextrose 50% Injectable 12.5 Gram(s) IV Push once  dextrose 50% Injectable 25 Gram(s) IV Push once  dextrose Oral Gel 15 Gram(s) Oral once PRN  enoxaparin Injectable 40 milliGRAM(s) SubCutaneous every 24 hours  folic acid 1 milliGRAM(s) Oral daily  glucagon  Injectable 1 milliGRAM(s) IntraMuscular once  influenza  Vaccine (HIGH DOSE) 0.5 milliLiter(s) IntraMuscular once  insulin lispro (ADMELOG) corrective regimen sliding scale   SubCutaneous Before meals and at bedtime  levothyroxine 25 MICROGram(s) Oral daily  meclizine 25 milliGRAM(s) Oral every 12 hours PRN  multivitamin  Chewable 1 Tablet(s) Oral daily  polyethylene glycol 3350 17 Gram(s) Oral daily  risperiDONE   Tablet 0.5 milliGRAM(s) Oral at bedtime  senna 2 Tablet(s) Oral at bedtime  thiamine 100 milliGRAM(s) Oral daily      Consultant(s) Notes Reviewed:  [x] YES  [ ] NO  Care Discussed with Consultants/Other Providers [x] YES  [ ] NO    LABS:              CAPILLARY BLOOD GLUCOSE      POCT Blood Glucose.: 129 mg/dL (22 Jan 2025 22:07)  POCT Blood Glucose.: 108 mg/dL (22 Jan 2025 18:04)  POCT Blood Glucose.: 119 mg/dL (22 Jan 2025 12:59)  POCT Blood Glucose.: 97 mg/dL (22 Jan 2025 09:26)            RADIOLOGY, EKG, & ADDITIONAL TESTS:      Imaging Personally Reviewed:  [x] YES  [ ] NO   OVERNIGHT EVENTS/INTERVAL HPI: NAEO    Subjective: resting comfortably, no acute complaints.    PHYSICAL EXAM:  General: in no acute distress  Eyes: EOMI intact bilaterally. Anicteric sclerae, moist conjunctivae  HENT: Moist mucous membranes  Lungs: CTA B/L. No wheezes, rales, or rhonchi  Cardiovascular: RRR. No murmurs, rubs, or gallops  Abdomen: Soft, non-tender non-distended; No rebound or guarding  Extremities: WWP, No clubbing, cyanosis or edema  Neurological: Alert and oriented  Skin: Warm and dry. No obvious rash     VITAL SIGNS:  T(C): 36.7 (01-23-25 @ 05:26), Max: 36.9 (01-22-25 @ 09:00)  HR: 66 (01-23-25 @ 05:26) (66 - 79)  BP: 114/73 (01-23-25 @ 05:26) (97/71 - 114/73)  RR: 18 (01-23-25 @ 05:26) (17 - 18)  SpO2: 92% (01-23-25 @ 05:26) (92% - 96%)    albuterol/ipratropium for Nebulization 3 milliLiter(s) Nebulizer every 6 hours PRN  aspirin  chewable 81 milliGRAM(s) Oral daily  atorvastatin 80 milliGRAM(s) Oral at bedtime  buPROPion XL (24-Hour) . 300 milliGRAM(s) Oral daily  cholecalciferol 1000 Unit(s) Oral daily  clonazePAM  Tablet 0.5 milliGRAM(s) Oral at bedtime  dextrose 5%. 1000 milliLiter(s) IV Continuous <Continuous>  dextrose 5%. 1000 milliLiter(s) IV Continuous <Continuous>  dextrose 50% Injectable 25 Gram(s) IV Push once  dextrose 50% Injectable 12.5 Gram(s) IV Push once  dextrose 50% Injectable 25 Gram(s) IV Push once  dextrose Oral Gel 15 Gram(s) Oral once PRN  enoxaparin Injectable 40 milliGRAM(s) SubCutaneous every 24 hours  folic acid 1 milliGRAM(s) Oral daily  glucagon  Injectable 1 milliGRAM(s) IntraMuscular once  influenza  Vaccine (HIGH DOSE) 0.5 milliLiter(s) IntraMuscular once  insulin lispro (ADMELOG) corrective regimen sliding scale   SubCutaneous Before meals and at bedtime  levothyroxine 25 MICROGram(s) Oral daily  meclizine 25 milliGRAM(s) Oral every 12 hours PRN  multivitamin  Chewable 1 Tablet(s) Oral daily  polyethylene glycol 3350 17 Gram(s) Oral daily  risperiDONE   Tablet 0.5 milliGRAM(s) Oral at bedtime  senna 2 Tablet(s) Oral at bedtime  thiamine 100 milliGRAM(s) Oral daily      Consultant(s) Notes Reviewed:  [x] YES  [ ] NO  Care Discussed with Consultants/Other Providers [x] YES  [ ] NO    LABS:              CAPILLARY BLOOD GLUCOSE      POCT Blood Glucose.: 129 mg/dL (22 Jan 2025 22:07)  POCT Blood Glucose.: 108 mg/dL (22 Jan 2025 18:04)  POCT Blood Glucose.: 119 mg/dL (22 Jan 2025 12:59)  POCT Blood Glucose.: 97 mg/dL (22 Jan 2025 09:26)            RADIOLOGY, EKG, & ADDITIONAL TESTS:      Imaging Personally Reviewed:  [x] YES  [ ] NO

## 2025-01-23 NOTE — PROGRESS NOTE ADULT - PROBLEM SELECTOR PLAN 2
#Acute hypoxic respiratory failure  #Influenza PNA  RESOLVED  CTA: negative for PE but showed bilateral lower lobe mucus plugging as well as bronchial wall thickening.   s/p 3L fluids in [facility] ED, remained hypotensive sBP 80s, MAP low 60. TSH wnl. On Lisinopril/HCTZ outpatient  - Weaned off levophed  - Midodrine increased to 10mg TID 1/13 PM --> increased to 20mg TID 1/15 PM --> weaned down on 1/17 to 15mg TID -> now off of midodrine  - started fludrocortisone 0.1mg qd 1/14 PM --> discontinued 1/16  - Watch HR closely with initiation of midodrine given h/o bradycardia  - Lipid panel (Total: 85 HDL: 31 LDL: 36)   - c/w atorvastatin 20mg at bedtime  - Maintain MAP>65   - Hold BP meds  - Weaned to room air as tolerated, Maintain SpO2 goal >92%   - Duoneb q4h standing  - chest PT BID, incentive spirometry, aerobika Patient c/o dizziness x 2 weeks. Per patient's PCP he has been chronically been c/o dizziness x months and has been having gait difficulties    - plan as above

## 2025-01-23 NOTE — DISCHARGE NOTE NURSING/CASE MANAGEMENT/SOCIAL WORK - FINANCIAL ASSISTANCE
[facility] provides services at a reduced cost to those who are determined to be eligible through [facility]’s financial assistance program. Information regarding [facility]’s financial assistance program can be found by going to [email or web address] or by calling [phone].

## 2025-01-23 NOTE — PROGRESS NOTE ADULT - PROBLEM SELECTOR PLAN 7
(RESOLVED) Mild Hyponatremia(132) possibly 2/2 SIADH 2/2 influenza; appears euvolemic on exam  - Daily BMP  - If Na continues to downtrend will consider fluid restrictions #MDD  #h/o schizoaffective disorder  Psychiatrist Dr. Corrales. Per chart review, has previously been prescribed Wellbutrin, risperidone, clonazepam, gabapentin; reports only taking Wellbutrin.   Home med: Wellbutrin SR 200mg BID, iSTOP with clonazepam filled monthly last filled 12/18 and per discussion today with patient's PCP he has most recently been rx'd risperidone, clonazepam, Wellbutrin (per the  shop last filled on 12/19/24)  - c/w buproprion 300mg qd (therapeutic interchange for Wellbutrin 150mg BID)   - c/w risperidone 0.5mg qHS  - c/w clonazepam 0.5mg qHS

## 2025-01-23 NOTE — PROGRESS NOTE ADULT - PROBLEM SELECTOR PLAN 11
Anemia likely anemia of chronic disease or from sepsis   Hgb 10.3 s/p 3L fluid resuscitation. Possible h/o pernicious anemia per [facility] chart review.   - Folate 6.0, B12 596  - folic acid and MV as above  - Maintain active T&S  - Daily CBC H/o Alcohol use disorder   ~2 drinks per week, Last drink 1/10. Per chart review, was empirically treated for Wernicke’s encephalopathy in 2022 at [place]. Discussed with mary's PCP about AUD and she states he is not forthcoming about his alcohol intake and quantity and frequency is not clear.   - IV thiamine 500mg qd x3d then 100mg qd  - c/w folic acid 1mg qd, multivitamin 1 tablet qd  - utox unable to be run x 2 due to acidic urine  -D/C [facility] protocol 1/22

## 2025-01-23 NOTE — PROGRESS NOTE ADULT - SUBJECTIVE AND OBJECTIVE BOX
Physical Medicine and Rehabilitation Progress Note :       Patient is a 69y old  Male who presents with a chief complaint of fever, cough, SOB (23 Jan 2025 06:57)      HPI:  69M PMH (per chart review) EtOH use disorder, HTN, ?hypothyroidism, ?schizoaffective disorder, CAD, ?CKD, diabetes who was BIBEMS to [facility] from shelter for SOB, cough, fevers, and malaise.    Patient reports feeling generally weak that started this morning. Called EMS because he was trying to get back to his residence but it was too cold outside and he felt weak. Also endorsed SOB, cough, and tremulousness when he presented to the ED. According to EMS SpO2 on arrival was 90%, he was placed on 2L NC with mild improvement in symptoms. He denies any known sick contacts or recent travel. Denies chills, CP, n/v, c/d, dysuria, blood in urine or stool.   Per [facility], patient is regularly prescribed several medications (including but not limited to lisinopril, buproprion, clonazepam, risperidone, metformin, atorvastatin, levothyroxine, etc) but most prescriptions remain unfilled; some medications last filled in May 2024. Pt reports only taking Wellbutrin daily, last took this AM.     No current tobacco use, reports ~12 pack year smoking history. Reports ~2 drinks per week, last drink 3 days ago. Denies other recreational drug use. Lives in residence housing. PCP is his psychiatrist Dr. Corrales whose office is also in his resident housing.    In [facility] ED:    Vitals: T 100.1F rectal,  -> 62, /77 -> 85/53, RR 21 SpO2 92% on 2L NC   Labs: WBC 9.55, Hgb 13.1, plt 262. BUN 25, Cr 2.24. , ALT 89. Lactate 4.6 -> 1.7. Troponin I 89.7 -> 106.3. pro-BNP 3520. RVP +influenza A.    Imaging: CTA negative for PE, b/l lower lobe mucus plugging.   Interventions: ceftriaxone/azithro, tamiflu, IV Mg 2g for repletion, 3L LR, Duonebs, Tylenol  (13 Jan 2025 01:26)                            11.2   8.73  )-----------( 428      ( 23 Jan 2025 08:02 )             33.9       01-23    135  |  100  |  29[H]  ----------------------------<  93  4.1   |  23  |  1.52[H]    Ca    8.6      23 Jan 2025 08:02  Phos  2.9     01-23  Mg     1.9     01-23      Vital Signs Last 24 Hrs  T(C): 36.6 (23 Jan 2025 09:00), Max: 36.7 (22 Jan 2025 21:12)  T(F): 97.9 (23 Jan 2025 09:00), Max: 98.1 (22 Jan 2025 21:12)  HR: 72 (23 Jan 2025 09:00) (66 - 79)  BP: 116/72 (23 Jan 2025 09:00) (97/71 - 116/72)  BP(mean): 81 (22 Jan 2025 21:12) (79 - 81)  RR: 18 (23 Jan 2025 09:00) (17 - 18)  SpO2: 95% (23 Jan 2025 09:00) (92% - 96%)    Parameters below as of 23 Jan 2025 09:00  Patient On (Oxygen Delivery Method): room air        MEDICATIONS  (STANDING):  aspirin  chewable 81 milliGRAM(s) Oral daily  atorvastatin 80 milliGRAM(s) Oral at bedtime  buPROPion XL (24-Hour) . 300 milliGRAM(s) Oral daily  cholecalciferol 1000 Unit(s) Oral daily  clonazePAM  Tablet 0.5 milliGRAM(s) Oral at bedtime  dextrose 5%. 1000 milliLiter(s) (50 mL/Hr) IV Continuous <Continuous>  dextrose 5%. 1000 milliLiter(s) (100 mL/Hr) IV Continuous <Continuous>  dextrose 50% Injectable 25 Gram(s) IV Push once  dextrose 50% Injectable 12.5 Gram(s) IV Push once  dextrose 50% Injectable 25 Gram(s) IV Push once  enoxaparin Injectable 40 milliGRAM(s) SubCutaneous every 24 hours  folic acid 1 milliGRAM(s) Oral daily  glucagon  Injectable 1 milliGRAM(s) IntraMuscular once  influenza  Vaccine (HIGH DOSE) 0.5 milliLiter(s) IntraMuscular once  insulin lispro (ADMELOG) corrective regimen sliding scale   SubCutaneous Before meals and at bedtime  levothyroxine 25 MICROGram(s) Oral daily  multivitamin  Chewable 1 Tablet(s) Oral daily  polyethylene glycol 3350 17 Gram(s) Oral daily  risperiDONE   Tablet 0.5 milliGRAM(s) Oral at bedtime  senna 2 Tablet(s) Oral at bedtime  thiamine 100 milliGRAM(s) Oral daily    MEDICATIONS  (PRN):  albuterol/ipratropium for Nebulization 3 milliLiter(s) Nebulizer every 6 hours PRN Shortness of Breath and/or Wheezing  dextrose Oral Gel 15 Gram(s) Oral once PRN Blood Glucose LESS THAN 70 milliGRAM(s)/deciliter  meclizine 25 milliGRAM(s) Oral every 12 hours PRN Dizziness      T(C): 36.6 (01-23-25 @ 09:00), Max: 36.7 (01-22-25 @ 21:12)  HR: 72 (01-23-25 @ 09:00) (66 - 79)  BP: 116/72 (01-23-25 @ 09:00) (97/71 - 116/72)  RR: 18 (01-23-25 @ 09:00) (17 - 18)  SpO2: 95% (01-23-25 @ 09:00) (92% - 96%)    Physical Exam:   69 y o man lying comfortably in semi Cheung's position , awake , alert , no acute complaints     Head: normocephalic , atraumatic    Eyes: PERRLA , EOMI , no nystagmus , sclera anicteric    ENT / FACE: neg nasal discharge , uvula midline , no oropharyngeal erythema / exudate    Neck: supple , negative JVD , negative carotid bruits , no thyromegaly    Chest: estefanía end exp wheezes    Cardiovascular: regular rate and rhythm , neg murmurs / rubs / gallops    Abdomen: soft , non distended , no tenderness to palpation in all 4 quadrants ,  normal bowel sounds     Extremities: WWP , neg cyanosis /clubbing / edema     Neurologic Exam:     Alert and oriented x 3      Motor Exam:        > 4/5 x 4 extremities     Sensation:         intact to light touch x 4 extremities                              DTR:           biceps/brachioradialis: equal                            patella/ankle: equal       1/22/2025  Functional Status Assessment :       Pain Assessment/Number Scale (0-10) Adult  Presence of Pain: denies pain/discomfort (Rating = 0)  Pain Rating (0-10): Rest: 0 (no pain/absence of nonverbal indicators of pain)  Pain Rating (0-10): Activity: 0 (no pain/absence of nonverbal indicators of pain)    Re-assessment (Number Scale)  Pain Rating: Rest (Reassessment) Pain Rating: Rest: 0 (no pain/absence of nonverbal indicators of pain)  Pain Rating: Activity (Reassessment) Pain Rating: Activity: 0 (no pain/absence of nonverbal indicators of pain)  Pain Response to Interventions: no change in pain    Safety      AM-PAC Functional Assessment: Basic Mobility  Type of Assessment: Daily assessment  Turning from your back to your side while in a flat bed without using bedrails?: 4 = No assist / stand by assistance  Moving from lying on your back to sitting on the flat side of a flat bed without using bedrails?: 4 = No assist / stand by assistance  Moving to and from a bed to a chair (including a wheelchair)?: 3 = A little assistance  Standing up from a chair using your arms (e.g. wheelchair or bedside chair)?: 3 = A little assistance  Walking in hospital room?: 3 = A little assistance  Climbing 3-5 steps with a railing?: 3-calculated by average   Score: 20   Row Comment: Ask the patient "How much help from another person do you currently need? (If the patient hasn't done an activity recently, how much help from another person do you think he/she needs if he/she tried?)    Cognitive/Neuro      Cognitive/Neuro/Behavioral  Level of Consciousness: alert  Arousal Level: arouses to voice  Orientation: oriented x 4  Speech: clear  Mood/Behavior: calm;  cooperative    Language Assistance  Preferred Language to Address Healthcare Preferred Language to Address Healthcare: English    Respiratory      Respiratory Pre/Post Treatment Assessment  O2 Delivery/Oxygen Delivery Method Patient On (Oxygen Delivery Method): room air    Therapeutic Interventions      Bed Mobility  Bed Mobility Training Rolling/Turning: independent  Bed Mobility Training Scooting: independent  Bed Mobility Training Bridging: independent  Bed Mobility Training Sit-to-Supine: independent  Bed Mobility Training Supine-to-Sit: independent    Sit-Stand Transfer Training  Transfer Training Sit-to-Stand Transfer: contact guard;  verbal cues;  1 person assist;  cues for hand placement ;  full weight-bearing   rolling walker  Transfer Training Stand-to-Sit Transfer: contact guard;  1 person assist;  verbal cues;  full weight-bearing   rolling walker;  cues for hand placement   Sit-to-Stand Transfer Training Transfer Safety Analysis: decreased balance;  decreased weight-shifting ability;  impaired balance;  decreased strength    Gait Training  Gait Training: contact guard;  1 person assist;  full weight-bearing   rolling walker;  100 feet  Gait Analysis: decreased sofia;  increased time in double stance;  decreased weight-shifting ability;  decreased step length;  decreased strength;  impaired balance;  impaired coordination    Therapeutic Exercise  Therapeutic Exercise Detail: supine B LE 2x10: heel slides, ankle pumps Sitting EOB B LE 2x10: LAQ, alt hip flexion             PM&R Impression : as above    Current disposition plan recommendation :    subacute rehab placement

## 2025-01-23 NOTE — CHART NOTE - NSCHARTNOTEFT_GEN_A_CORE
Admitting Diagnosis:   Patient is a 69y old  Male who presents with a chief complaint of fever, cough, SOB (21 Jan 2025 10:31)      PAST MEDICAL & SURGICAL HISTORY:  H/O: depression      Current Nutrition Order:   Diet, NPO:   Except Medications  With Ice Chips/Sips of Water (01-21-25 @ 08:11)    PO Intake: Good (%) [   ]  Fair (50-75%) [   ] Poor (<25%) [   ] ... N/A as pt NPO for planned MARCO ANTONIO today     GI Issues: WNL as per flowsheet, denies nausea/ vomiting/ diarrhea but complains of feeling constipated and asking for laxatives - pt on bowel regimen (miralax and senna).     Pain: 0/10 as per flowsheet    Skin Integrity: WNL as per flowsheet, no pressure injuries documented, andres 20, no edema documented           Labs:   01-20    139  |  103  |  22  ----------------------------<  85  4.1   |  25  |  1.07    Ca    8.4      20 Jan 2025 08:03  Phos  2.9     01-20  Mg     2.0     01-20      CAPILLARY BLOOD GLUCOSE  POCT Blood Glucose.: 83 mg/dL (21 Jan 2025 09:35)  POCT Blood Glucose.: 108 mg/dL (21 Jan 2025 00:24)  POCT Blood Glucose.: 123 mg/dL (20 Jan 2025 18:12)  POCT Blood Glucose.: 100 mg/dL (20 Jan 2025 14:11)      Medications:  MEDICATIONS  (STANDING):  aspirin  chewable 81 milliGRAM(s) Oral daily  atorvastatin 20 milliGRAM(s) Oral at bedtime  buPROPion XL (24-Hour) . 300 milliGRAM(s) Oral daily  cholecalciferol 1000 Unit(s) Oral daily  clonazePAM  Tablet 0.5 milliGRAM(s) Oral at bedtime  dextrose 5%. 1000 milliLiter(s) (50 mL/Hr) IV Continuous <Continuous>  dextrose 5%. 1000 milliLiter(s) (100 mL/Hr) IV Continuous <Continuous>  dextrose 50% Injectable 25 Gram(s) IV Push once  dextrose 50% Injectable 12.5 Gram(s) IV Push once  dextrose 50% Injectable 25 Gram(s) IV Push once  enoxaparin Injectable 40 milliGRAM(s) SubCutaneous every 24 hours  folic acid 1 milliGRAM(s) Oral daily  glucagon  Injectable 1 milliGRAM(s) IntraMuscular once  influenza  Vaccine (HIGH DOSE) 0.5 milliLiter(s) IntraMuscular once  insulin lispro (ADMELOG) corrective regimen sliding scale   SubCutaneous Before meals and at bedtime  levothyroxine 25 MICROGram(s) Oral daily  multivitamin  Chewable 1 Tablet(s) Oral daily  polyethylene glycol 3350 17 Gram(s) Oral daily  risperiDONE   Tablet 0.5 milliGRAM(s) Oral at bedtime  senna 2 Tablet(s) Oral at bedtime  thiamine 100 milliGRAM(s) Oral daily    MEDICATIONS  (PRN):  albuterol/ipratropium for Nebulization 3 milliLiter(s) Nebulizer every 6 hours PRN Shortness of Breath and/or Wheezing  dextrose Oral Gel 15 Gram(s) Oral once PRN Blood Glucose LESS THAN 70 milliGRAM(s)/deciliter  meclizine 25 milliGRAM(s) Oral every 12 hours PRN Dizziness      Height for BMI (CENTIMETERS)	170.2 Centimeter(s)  Weight for BMI (lbs)	130.1 lb  Weight for BMI (kg)	59 kg  Body Mass Index	20.3    Weight Change: No new wt since admit.     [Severe, chronic Protein Calorie Malnutrition: Risk Assessment Completed 1/13]  - PO intake: </= 75% for >/= 1 month  - Wt Loss: > 5% in 1 month  - NFPE: Moderate orbital, temporal, and clavicular wasting  - Edema: N/A    Estimated energy needs:   Weight used for calculations	current weight  Estimated Energy Needs Weight (lbs)	130 lb  Estimated Energy Needs Weight (kg)	59 kg  Estimated Energy Needs From (lorraine/kg)	30  Estimated Energy Needs To (lorraine/kg)	35  Estimated Energy Needs Calculated From (lorraine/kg)	1770  Estimated Energy Needs Calculated To (lorraine/kg)	2065    Estimated Protein Needs Weight (lbs)	130 lb  Estimated Protein Needs Weight (kg)	59 kg  Estimated Protein Needs From (g/kg)	1.5  Estimated Protein Needs To (g/kg)	2  Estimated Protein Needs Calculated From (g/kg)	88.5  Estimated Protein Needs Calculated To (g/kg)	118    Estimated Fluid Needs Weight (lbs)	130 lb  Estimated Fluid Needs Weight (kg)	59 kg  Estimated Fluid Needs From (ml/kg)	30  Estimated Fluid Needs To (ml/kg)	35  Estimated Fluid Needs Calculated From (ml/kg)	1770  Estimated Fluid Needs Calculated To (ml/kg)	2065    Other Calculations	Needs determined using current body weight 59 kg (83%IBW) adjusted for protein-calorie malnutrition.    Subjective: 69M PMH (per chart review) EtOH use disorder, depression, HTN, hypothyroid, CAD, ?CKD, diabetes who was BIBEMS to [facility] from shelter for SOB, cough, fevers, and malaise, started on pressors and admitted to [facility] MICU for septic shock 2/2 influenza. 1/15: step down 7LA.     Patient seen at bedside on 4UR for nutrition assessment. Current diet order: NPO with sips/ chips as of this morning due to planned MARCO ANTONIO today. Otherwise, reports good appetite and endorses consuming 3 meals/day, ~75% of each meal, likely meeting >/= 75% estimated nutrient needs while in [facility]. Denies nausea/vomiting/diarrhea, complains of feeling constipated and asking for laxatives upon RD visit but awaiting due to planned MARCO ANTONIO, on bowel regimen of miralax and senna. Labs: POCTs within goal of 100-180 mg/dl (83, 108, 123, 100, 122 mg/dl...). Meds reviewed as above, remains on sliding scale insulin. RD will continue to follow, see nutrition recommendations below.     Previous Nutrition Diagnosis: Malnutrition... severe, chronic related to unable to meet at least 75% estimated energy requirements as evidenced by unintentional wt loss, inadequate PO intake, and significant muscle/fat wasting.    Active [ x  ]  Resolved [   ]    Goal: Pt will consume >/= 75% of estimated nutrient needs     Recommendations:  1. Continue regular diet order as feasible once status post procedure   >> Continue with ensure plus high protein 2x/day to optimize PO intake (provides 350 kcal, 20g protein/ shake)    2. Encourage and monitor PO intake, honor preferences as able   >> Consistently meet >75% of estimated needs during admission   3. Monitor wt trends, GI function, skin integrity  4. Monitor lytes, renal indices, blood glucose, LFTs    5. Pain and bowel regimen per team  6. Insulin as per medical team   7. Continue with MVI to maximize nutrient intake    RD will continue to monitor PO intake, labs, hydration, and wt prn.     Education: RD continued to encourage PO intake during meals & reviewed importance, pt receptive.     Risk Level: High [   ] Moderate [  x ] Low [   ]
Collateral Received from patient's PCP Nena Calderon NP was contacted from the patient's residence "[facility]".     Ms. Calderon reported that Mr. Robb has been under her care and has a history of Pre-DM, Hyperlipidemia, Hypothyroid, HTN, Alcohol use disorder, chronic peripheral edema and has been c/o chronic dizziness for which they were attempting to work him up for a possible vestibular disorder but he gets lost to follow up and has questionable compliance with his medication regimen. She also reported that he has been having unsteady gait with a wide stand for awhile and is not forth coming to his alcohol use but follows with Dr. Pena for his psychiatric care.    Per PCP his medications are   Clonazepam 0.5mg qd  bupropion ER 200mg BID  risperidone 2mg qhs  lisinopril/HCTZ 20-12.5mg qd  metformin ER 500mg qd   atorvastatin 20mg qhs  levothyroxine 25mcg  Lasix 20mg PRN (peripheral edema)  Vit D 25mcg qd    His adherence to this regimen is unclear. He has labs in August of 2024 and his Cr at that time was 1.27 and Hgb/HCT was 14.9/44.2 MCV 94 PLTs: 461.
PDI	My Rx	Current Rx	Drug Type	Rx Written	Rx Dispensed	Drug	Quantity	Days Supply	Prescriber Name	Prescriber KELLY #	Payment Method	Dispenser  A	N	Y	B	12/18/2024	01/10/2025	clonazepam 0.5 mg tablet	30	30	Corrales, Win VILLALTA)	[id number]	Insurance	[employer]  A	N	N	B	11/20/2024	12/12/2024	clonazepam 0.5 mg tablet	30	30	Corrales, Win VILLALTA)	[id number]	Insurance	[employer]  A	N	N	B	10/23/2024	11/14/2024	clonazepam 0.5 mg tablet	30	30	Corrales, Win VILLALTA)	[id number]	Insurance	[employer]  A	N	N	B	07/24/2024	07/31/2024	clonazepam 0.5 mg tablet	30	30	Corrales, Win VILLALTA)	[id number]	Insurance	[employer]  A	N	N	B	05/22/2024	06/07/2024	clonazepam 0.5 mg tablet	30	30	Corrales, Win VILLALTA)	[id number]	Insurance	[employer]  B	N	N	B	04/24/2024	05/08/2024	clonazepam 0.5 mg tablet	30	30	Corrales, Win VILLALTA)	[id number]	Medicare	[facility] #[id number]  B	N	N	B	03/27/2024	04/09/2024	clonazepam 0.5 mg tablet	30	30	Corrales, Win VILLALTA)	[id number]	Medicare	[facility] #[id number]  B	N	N	B	02/28/2024	03/01/2024	clonazepam 0.5 mg tablet	30	30	Corrales, Win VILLALTA)	[id number]	Medicare	[facility] #[id number]  B	N	N	B	01/24/2024	01/26/2024	clonazepam 0.5 mg tablet	30	30	Corrales, Win VILLALTA)	[id number]	Medicare	[facility] #[id number]
Pt Mario Alberto Robb has suffered a cerebellar stroke w resulting stroke sequelae including severe dizziness, ataxia, and weakness w deconditiong. He is unable to complete his ADLs without use of a rolling walker.     Dx G46.4, I69.398, R53.1, for which pt will require ROLLING WALKER & PHYSICAL THERAPY.
Admitting Diagnosis:   Patient is a 69y old  Male who presents with a chief complaint of fever, cough, SOB (16 Jan 2025 07:29)      PAST MEDICAL & SURGICAL HISTORY:  H/O: depression          Current Nutrition Order:       PO Intake: Good (%) [   ]  Fair (50-75%) [   ] Poor (<25%) [   ]    GI Issues:     Pain:    Skin Integrity:        01-15-25 @ 07:01  -  01-16-25 @ 07:00  --------------------------------------------------------  IN: 1399 mL / OUT: 1900 mL / NET: -501 mL    01-16-25 @ 07:01 - 01-16-25 @ 17:20  --------------------------------------------------------  IN: 0 mL / OUT: 325 mL / NET: -325 mL        Labs:   01-16    138  |  106  |  19  ----------------------------<  137[H]  3.8   |  25  |  1.08    Ca    8.0[L]      16 Jan 2025 11:37  Phos  1.3     01-16  Mg     1.8     01-16    TPro  5.3[L]  /  Alb  2.9[L]  /  TBili  0.3  /  DBili  x   /  AST  41[H]  /  ALT  38  /  AlkPhos  62  01-16    CAPILLARY BLOOD GLUCOSE      POCT Blood Glucose.: 126 mg/dL (16 Jan 2025 16:01)  POCT Blood Glucose.: 129 mg/dL (16 Jan 2025 10:53)  POCT Blood Glucose.: 93 mg/dL (16 Jan 2025 06:10)  POCT Blood Glucose.: 114 mg/dL (15 [place] 2025 21:25)      Medications:  MEDICATIONS  (STANDING):  albuterol/ipratropium for Nebulization 3 milliLiter(s) Nebulizer every 6 hours  atorvastatin 20 milliGRAM(s) Oral at bedtime  buPROPion XL (24-Hour) . 300 milliGRAM(s) Oral daily  chlorhexidine 2% Cloths 1 Application(s) Topical <User Schedule>  cholecalciferol 1000 Unit(s) Oral daily  clonazePAM  Tablet 0.5 milliGRAM(s) Oral at bedtime  dextrose 5%. 1000 milliLiter(s) (50 mL/Hr) IV Continuous <Continuous>  dextrose 5%. 1000 milliLiter(s) (100 mL/Hr) IV Continuous <Continuous>  dextrose 50% Injectable 25 Gram(s) IV Push once  dextrose 50% Injectable 12.5 Gram(s) IV Push once  dextrose 50% Injectable 25 Gram(s) IV Push once  enoxaparin Injectable 40 milliGRAM(s) SubCutaneous every 24 hours  folic acid 1 milliGRAM(s) Oral daily  glucagon  Injectable 1 milliGRAM(s) IntraMuscular once  influenza  Vaccine (HIGH DOSE) 0.5 milliLiter(s) IntraMuscular once  insulin lispro (ADMELOG) corrective regimen sliding scale   SubCutaneous Before meals and at bedtime  levothyroxine 25 MICROGram(s) Oral daily  midodrine 20 milliGRAM(s) Oral once  multivitamin  Chewable 1 Tablet(s) Oral daily  oseltamivir 30 milliGRAM(s) Oral every 12 hours  polyethylene glycol 3350 17 Gram(s) Oral daily  risperiDONE   Tablet 0.5 milliGRAM(s) Oral at bedtime  senna 2 Tablet(s) Oral at bedtime  thiamine 100 milliGRAM(s) Oral daily    MEDICATIONS  (PRN):  dextrose Oral Gel 15 Gram(s) Oral once PRN Blood Glucose LESS THAN 70 milliGRAM(s)/deciliter  meclizine 25 milliGRAM(s) Oral every 12 hours PRN Dizziness    Height for BMI (CENTIMETERS)	170.2 Centimeter(s)  Weight for BMI (lbs)	130.1 lb  Weight for BMI (kg)	59 kg  Body Mass Index	20.3    Weight Change: No new wt since admit.     [Severe, chronic Protein Calorie Malnutrition: Risk Assessment Completed 1/13]  - PO intake: </= 75% for >/= 1 month  - Wt Loss: > 5% in 1 month  - NFPE: Moderate orbital, temporal, and clavicular wasting  - Edema: N/A    Estimated energy needs:   Weight used for calculations	current weight  Estimated Energy Needs Weight (lbs)	130 lb  Estimated Energy Needs Weight (kg)	59 kg  Estimated Energy Needs From (lorraine/kg)	30  Estimated Energy Needs To (lorraine/kg)	35  Estimated Energy Needs Calculated From (lorraine/kg)	1770  Estimated Energy Needs Calculated To (lorraine/kg)	2065    Estimated Protein Needs Weight (lbs)	130 lb  Estimated Protein Needs Weight (kg)	59 kg  Estimated Protein Needs From (g/kg)	1.5  Estimated Protein Needs To (g/kg)	2  Estimated Protein Needs Calculated From (g/kg)	88.5  Estimated Protein Needs Calculated To (g/kg)	118    Estimated Fluid Needs Weight (lbs)	130 lb  Estimated Fluid Needs Weight (kg)	59 kg  Estimated Fluid Needs From (ml/kg)	30  Estimated Fluid Needs To (ml/kg)	35  Estimated Fluid Needs Calculated From (ml/kg)	1770  Estimated Fluid Needs Calculated To (ml/kg)	2065    Other Calculations	Needs determined using current body weight 59 kg (83%IBW) adjusted for protein-calorie malnutrition.    Subjective: 69M PMH (per chart review) EtOH use disorder, depression, HTN, hypothyroid, CAD, ?CKD, diabetes who was BIBEMS to [facility] from shelter for SOB, cough, fevers, and malaise, started on pressors and admitted to [facility] MICU for septic shock 2/2 influenza. 1/15: step down 7LA.     Pt assessed in room on 7LA. Rx and labs reviewed. Pt received resting in bed, alert and participatory in nutrition assessment. Pt reports a good appetite and reflective PO intake; meal tray at bedside ~75% consumed. No new complaints of nausea/vomiting/constipation/diarrhea or difficult chew/swallow. Continues on liberalized diet with Ensure; enjoys oral nutrition supplement and drinks them well per pt report. No further nutrition related questions or concerns. RDN to remain available, see recommendations below.     Previous Nutrition Diagnosis: Malnutrition... severe, chronic related to unable to meet at least 75% estimated energy requirements as evidenced by unintentional wt loss, inadequate PO intake, and significant muscle/fat wasting.    Active [ x ]  Resolved [   ]    Goal: Pt will meet 75% or more of protein/energy needs via most appropriate route for nutrition and reduce/resolve s/sx protein-calorie malnutrition.    Recommendations:  -Continue liberalized diet as able  -Continue Ensure Plus High Protein (350 calories and 20 gProt. each) BID  -Encourage good PO intake  -Honor food preferences as able  -Weekly wts  -Monitor chemistry, GI function, and skin integrity     Risk Level: High [ x ] Moderate [   ] Low [   ]
Patient seen and examined this morning  CTA and MARCO ANTONIO largely unremarkable  Ideally would rec outpatient cardiac monitoring if social situation allows  No further stroke w/u indicated
earch Terms: Mario Alberto Robb, 1955Search Date: 01/13/2025 08:18:53 AM  The Drug Utilization Report below displays all of the controlled substance prescriptions, if any, that your patient has filled in the last twelve months. The information displayed on this report is compiled from pharmacy submissions to the Department, and accurately reflects the information as submitted by the pharmacies.    This report was requested by: Bebeto Lewis | Reference #: [id number]    Practitioner Count: 1  Pharmacy Count: 1  Current Opioid Prescriptions: 0  Current Benzodiazepine Prescriptions: 1  Current Stimulant Prescriptions: 0      Patient Demographic Information (PDI)       PDI	First Name	Last Name	Birth Date	Gender	Street Address	[facility]	Zip Code  A	Mario Alberto Robb	1955	Male	[address]	[facility]	[address]  B	Mario Alberto Robb	1955	Male	[address] 	[facility]	[address]    Prescription Information      PDI Filter:    PDI	My Rx	Current Rx	Drug Type	Rx Written	Rx Dispensed	Drug	Quantity	Days Supply	Prescriber Name	Prescriber KELLY #	Payment Method	Dispenser  A	N	Y	B	12/18/2024	01/10/2025	clonazepam 0.5 mg tablet	30	30	Corrales, Win VILLALTA)	[id number]	Insurance	[facility]  A	N	N	B	11/20/2024	12/12/2024	clonazepam 0.5 mg tablet	30	30	Corrales, Win VILLALTA)	[id number]	Insurance	[facility]  A	N	N	B	10/23/2024	11/14/2024	clonazepam 0.5 mg tablet	30	30	Corrales, Win VILLALTA)	[id number]	Insurance	[facility]  A	N	N	B	07/24/2024	07/31/2024	clonazepam 0.5 mg tablet	30	30	Corrales, Win VILLALTA)	[id number]	Insurance	[facility]  A	N	N	B	05/22/2024	06/07/2024	clonazepam 0.5 mg tablet	30	30	Corrales, Win VILLALTA)	[id number]	Insurance	[facility]  B	N	N	B	04/24/2024	05/08/2024	clonazepam 0.5 mg tablet	30	30	Corrales, Win VILLALTA)	[id number]	Medicare	[facility] #[id number]  B	N	N	B	03/27/2024	04/09/2024	clonazepam 0.5 mg tablet	30	30	Corrales, Win VILLALTA)	[id number]	Medicare	[facility] #[id number]  B	N	N	B	02/28/2024	03/01/2024	clonazepam 0.5 mg tablet	30	30	Win Corrales)	[id number]	Medicare	[facility] #[id number]  B	N	N	B	01/24/2024	01/26/2024	clonazepam 0.5 mg tablet	30	30	Win Corrales)	[id number]	Medicare	[facility] #[id number]

## 2025-01-23 NOTE — CHART NOTE - NSCHARTNOTESELECT_GEN_ALL_CORE
ISTOP
Nutrition Services
Off Service Note
Collateral PCP
ISTOP/Event Note
RD Follow Up/Nutrition Services
Rolling Walker/Event Note

## 2025-01-23 NOTE — PROGRESS NOTE ADULT - PROBLEM SELECTOR PLAN 9
#MDD  #h/o schizoaffective disorder  Psychiatrist Dr. Corrales. Per chart review, has previously been prescribed Wellbutrin, risperidone, clonazepam, gabapentin; reports only taking Wellbutrin.   Home med: Wellbutrin SR 200mg BID, iSTOP with clonazepam filled monthly last filled 12/18 and per discussion today with patient's PCP he has most recently been rx'd risperidone, clonazepam, Wellbutrin (per the  shop last filled on 12/19/24)  - c/w buproprion 300mg qd (therapeutic interchange for Wellbutrin 150mg BID)   - c/w risperidone 0.5mg HS  - c/w clonazepam 0.5mg HS (RESOLVED)   Mild Hyponatremia(132) possibly 2/2 SIADH 2/2 influenza; appears euvolemic on exam

## 2025-01-23 NOTE — PROGRESS NOTE ADULT - PROBLEM SELECTOR PLAN 3
(RESOLVED)  Patient met sepsis criteria on admission (HR >90, RR >20). RVP +influenza A. UA neg for UTI. Lactate elevated to 4.6. Hypotension refractory to 3L fluids.    Lactate cleared s/p 3L fluids. s/p CTX 1g and azithromycin in ED. Procal (after receiving abx) 1.05.  - s/p CTX 1g/Azithro 500mg x 1 (1/12) and s/p Tamiflu 30mg q24h x5d (1/12 - 1/16)   - BCx no growth at 72h  - Urine legionella negative  - MRSA neg SHIRA on admission likely prerenal 2/2 hypovolemia   Cr 1.3-1.4 in 2022 and per PCP 1.27 in August 2024.   P/w Cr 2.24, suspect pre-renal iso septic shock and decreased po intake. UA spec grav elevated 1.046, CK 1026, FeNa 0.9%   1/23 acute increase in Cr (1.1 -> 1.5), suspect contrast-induced nephropathy iso CTA on 1/21    - f/u urine studies  - 1L LR over 2 hrs  - CTM Cr

## 2025-01-23 NOTE — PROGRESS NOTE ADULT - ASSESSMENT
69M PMH (per chart review) EtOH use disorder, depression, former smoker (20 pack year quit 15 years ago), HTN, hypothyroid, CAD, diabetes who was BIBEMS to [facility] from shelter for SOB, cough, fevers, and malaise, initially admitted to MICU in septic shock 2/2 influenza now improved. course complicated by dizziness, found to have subacute/chronic infacts on MRI, now optimized by neuro and pending TARYN.

## 2025-01-23 NOTE — DISCHARGE NOTE NURSING/CASE MANAGEMENT/SOCIAL WORK - PATIENT PORTAL LINK FT
You can access the FollowMyHealth Patient Portal offered by [facility] by registering at the following website: [email or web address]. By joining [employer]’s FollowMyHealth portal, you will also be able to view your health information using other applications (apps) compatible with our system.

## 2025-01-23 NOTE — PROGRESS NOTE ADULT - TIME BILLING
Bedside exam and interview   Reviewed vitals, labs   Discussed patient's plan of care with housestaff   Documentation of encounter  Excludes teaching time and/or separately reported services
Bedside exam and interview. Reviewed vitals, labs. Discussed patient's plan of care with housestaff. Documentation of encounter. Excludes teaching time and/or separately reported services.
Bedside exam and interview    Review of hospital course, labs, vitals, imaging ,  medical records.   Reviewing outside records   Discharge planning on Interdisciplinary rounds   Discussion with consultants and Primary team   Documenting the encounter.
Bedside exam and interview   Reviewed vitals, labs   Discussed patient's plan of care with housestaff   Documentation of encounter  Excludes teaching time and/or separately reported services

## 2025-01-23 NOTE — PROGRESS NOTE ADULT - PROBLEM SELECTOR PLAN 4
(RESOLVED)   SHIRA likely prerenal 2/2 hypovolemia (Resolved)  Cr 1.3-1.4 in 2022 and per PCP 1.27 in August 2024. P/w Cr 2.24, suspect pre-renal iso septic shock and decreased po intake. UA spec grav elevated 1.046, CK 1026, FeNa 0.9%   - Strict I/O #Acute hypoxic respiratory failure  #Influenza PNA  RESOLVED  CTA: negative for PE but showed bilateral lower lobe mucus plugging as well as bronchial wall thickening.   s/p 3L fluids in [facility] ED, required pressors, now weaned off pressors, midodrine, and fludrocortisone.    - Weaned to room air as tolerated, Maintain SpO2 goal >92%   - Duoneb q6h PRN  - chest PT BID, incentive spirometry, aerobika

## 2025-01-23 NOTE — PROGRESS NOTE ADULT - PROBLEM SELECTOR PLAN 6
, ALT 89. Ddx hypoperfusion iso septic shock vs alcohol hepatitis  - LFTs continue to downtrend  - Hepatitis panel negative   - RUQ not indicated no RUQ tenderness and LFTs almost normal Previously prescribed Synthroid 25mcg qd filled 12/12/25 per the  shop   TSH 1.18; T4 4.52  - c/w home dose of levothyroxine 25mcg

## 2025-01-23 NOTE — DISCHARGE NOTE NURSING/CASE MANAGEMENT/SOCIAL WORK - NSDCFUADDAPPT_GEN_ALL_CORE_FT
The scheduled appt is for your neurology appointment.   Please arrive at your appointment 30 minutes prior to the scheduled time, bring your photo ID, list of medications and this discharge paperwork.

## 2025-01-23 NOTE — PROGRESS NOTE ADULT - ASSESSMENT
{\rtf1\svqgvp56039\ansi\fpgdqgn3388\ftnbj\uc1\deff0  {\fonttbl{\f0 \fnil Segoe UI;}{\f1 \fnil \fcharset0 Segoe UI;}{\f2 \fnil Times New Usama;}}  {\colortbl ;\iiy467\kuxrg569\ucnm951 ;\red0\green0\blue0 ;\red0\green0\xcdq325 ;\red0\green0\blue0 ;}  {\stylesheet{\f0\fs20 Normal;}{\cs1 Default Paragraph Font;}{\cs2\f0\fs16 Line Number;}{\cs3\f2\fs24\ul\cf3 Hyperlink;}}  {\*\revtbl{Unknown;}}  \yirylk55989\toinog73624\sgndy5791\fhinb1905\lsplb4867\ipybi3345\kkgmllg805\etvrpra373\nogrowautofit\xzutgd410\formshade\nofeaturethrottle1\dntblnsbdb\fet4\aendnotes\aftnnrlc\pgbrdrhead\pgbrdrfoot  \sectd\csxoyh18615\gikjot14994\guttersxn0\nqqoyiko9332\xveuvxwn9937\wleyibib9227\eftnvuji9033\zwpchql562\wlzeifu516\sbkpage\pgncont\pgndec  \plain\plain\f0\fs24\ql\plain\f0\fs24\plain\f0\fs20\cnlr0543\hich\f0\dbch\f0\loch\f0\fs20\par  I M\par  \par  69 y o M PMH (per chart review) EtOH use disorder, depression, former smoker (20 pack year quit 15 years ago), HTN, hypothyroid, CAD, diabetes who was BIBEMS to [facility] from shelter for SOB, cough, fevers, and malaise, initially admitted to MICU in septic   shock 2/2 influenza now improved. course complicated by dizziness, found to have subacute/chronic infacts on MRI, now optimized by neuro and pending TARYN.\par  \par  \plain\f1\fs20\scah1769\hich\f1\dbch\f1\loch\f1\cf2\fs20\ul{\field{\*\fldinst HYPERLINK [id number] }{\fldrslt Nutritional Assessment:}}\plain\f0\fs20\wgoe0960\hich\f0\dbch\f0\loch\f0\fs20\ql\par  \trowd\abjlhz04\lastrow\irrstbq82\trpaddfl3\mhsrjhl50\trpaddfr3\trpaddt0\trpaddft3\trpaddb0\trpaddfb3\trleft0  \clvertalt\jmlsqa13\clpadft3\utkmrd98\clpadfr3\clpadl0\clpadfl3\clpadb0\clpadfb3\tblcw5524  \clvertalt\rgnuva95\clpadft3\iswuyh21\clpadfr3\clpadl0\clpadfl3\clpadb0\clpadfb3\ghfem4657  \pard\intbl\ssparaaux0\s0\fi-120\li120\ql\plain\f0\fs24{\*\bkmkstart sb459560674966}{\*\bkmkend hw195746300862}\plain\f0\fs20\kwpm0277\hich\f0\dbch\f0\loch\f0\fs20 \'b7 Nutritional Assessment\cell  \pard\intbl\ssparaaux0\s0\li300\ri300\ql\plain\f0\fs24\plain\f0\fs20\rlyp2783\hich\f0\dbch\f0\loch\f0\fs20 This patient has been assessed with a concern for Malnutrition and has been determined to have a diagnosis/diagnoses of Severe protein-calorie malnutrition.\par  \par  This patient is being managed with: \par  Diet Consistent Carbohydrate w/Evening Snack-\par  Low Sodium\par  Entered: Jan 21 2025  7:42PM\cell  \intbl\row  \pard\ssparaaux0\s0\ql\plain\f0\fs24\plain\f0\fs20\ktkh0463\hich\f0\dbch\f0\loch\f0\fs20\par  \plain\f1\fs20\uomd2224\hich\f1\dbch\f1\loch\f1\cf2\fs20\ul{\field{\*\fldinst HYPERLINK [id number] }{\fldrslt Problem/Plan - 1:}}\plain\f0\fs20\krkp4499\hich\f0\dbch\f0\loch\f0\fs20\ql\par  \'b7  {\*\bkmkstart cp17532100446}{\*\bkmkend la79374898789}Problem: {\*\bkmkstart bo81296849986}{\*\bkmkend il65158279160}Stroke. \par  \'b7  {\*\bkmkstart gz81541727334}{\*\bkmkend kj52731667855}Plan: {\*\bkmkstart bu07554689071}{\*\bkmkend ki91318844902}#Cerebellar stroke with Dizziness and Ataxia\par  \par  MRI S/o acute/subacute stroke in posterior circulation. MRA neck and CTA head unremarkable\par  vEEG read from 1/16: no evidence of seizures\par  TTE negative for PFO (normal biventricular fxn).\par  Lipid panel (Total: 85 HDL: 31 LDL: 36) \par  \par  - fu neuro and stroke recs\par  - c/w aspirin and statin 80mg qhs\par  - c/w meclizine 25mg q12h PRN \par  - c/w thiamine 100mg qd.\plain\f1\fs20\stuc9188\hich\f1\dbch\f1\loch\f1\cf2\fs20\strike\plain\f0\fs20\otes8671\hich\f0\dbch\f0\loch\f0\fs20\par  \par  \plain\f1\fs20\eicu9910\hich\f1\dbch\f1\loch\f1\cf2\fs20\ul{\field{\*\fldinst HYPERLINK [id number] }{\fldrslt Problem/Plan - 2:}}\plain\f0\fs20\ghjk3468\hich\f0\dbch\f0\loch\f0\fs20\ql\par  \'b7  {\*\bkmkstart ma81285930599}{\*\bkmkend bt80448968999}Problem: {\*\bkmkstart xz18841129697}{\*\bkmkend ms88666392986}Unsteady gait.\plain\f1\fs20\xgyx1587\hich\f1\dbch\f1\loch\f1\cf2\fs20\strike\plain\f0\fs20\iqin4128\hich\f0\dbch\f0\loch\f0\fs20\par  \'b7  {\*\bkmkstart qo09623810568}{\*\bkmkend kk04700503398}Plan: {\*\bkmkstart gy76799917545}{\*\bkmkend hg49798051156}Patient c/o dizziness x 2 weeks. Per patient's PCP he has been chronically been c/o dizziness x months and has been having gait difficulties\par  \par  - plan as above.\plain\f1\fs20\cnlk2757\hich\f1\dbch\f1\loch\f1\cf2\fs20\strike\plain\f0\fs20\zpta9335\hich\f0\dbch\f0\loch\f0\fs20\par  \par  \plain\f1\fs20\ugug3270\hich\f1\dbch\f1\loch\f1\cf2\fs20\ul{\field{\*\fldinst HYPERLINK [id number] }{\fldrslt Problem/Plan - 3:}}\plain\f0\fs20\bqor3909\hich\f0\dbch\f0\loch\f0\fs20\ql\par  \'b7  {\*\bkmkstart hs12625182014}{\*\bkmkend wc43839884271}Problem: {\*\bkmkstart lh71339214676}{\*\bkmkend ye90364682844}SHIRA (acute kidney injury).\plain\f1\fs20\lops1629\hich\f1\dbch\f1\loch\f1\cf2\fs20\strike\plain\f0\fs20\ftsp4606\hich\f0\dbch\f0\loch\f0\fs20\par  \'b7  {\*\bkmkstart tb94786381795}{\*\bkmkend oa95128537942}Plan: {\*\bkmkstart ek94378284949}{\*\bkmkend pw78316782879}SHIRA on admission likely prerenal 2/2 hypovolemia \par  Cr 1.3-1.4 in 2022 and per PCP 1.27 in August 2024. \par  P/w Cr 2.24, suspect pre-renal iso septic shock and decreased po intake. UA spec grav elevated 1.046, CK 1026, FeNa 0.9% \par  1/23 acute increase in Cr (1.1 -> 1.5), suspect contrast-induced nephropathy iso CTA on 1/21\par  \par  - f/u urine studies\par  - 1L LR over 2 hrs\par  - CTM Cr.\plain\f1\fs20\orjp4603\hich\f1\dbch\f1\loch\f1\cf2\fs20\strike\plain\f0\fs20\qazs9200\hich\f0\dbch\f0\loch\f0\fs20\par  \par  \plain\f1\fs20\ltth5965\hich\f1\dbch\f1\loch\f1\cf2\fs20\ul{\field{\*\fldinst HYPERLINK [id number] }{\fldrslt Problem/Plan - 4:}}\plain\f0\fs20\npln9710\hich\f0\dbch\f0\loch\f0\fs20\ql\par  \'b7  {\*\bkmkstart fy63080454141}{\*\bkmkend en37327893065}Problem: {\*\bkmkstart gt44749338395}{\*\bkmkend sb71372427493}Sepsis with acute hypoxic respiratory failure.\plain\f1\fs20\aciz0561\hich\f1\dbch\f1\loch\f1\cf2\fs20\strike\plain\f0\fs20\uacv1655\hich\f0\dbch\f0\loch\f0\fs20\par  \'b7  {\*\bkmkstart cr99823863952}{\*\bkmkend xw07514334214}Plan: {\*\bkmkstart na04655152594}{\*\bkmkend an89875882840}#Acute hypoxic respiratory failure\par  #Influenza PNA\par  RESOLVED\par  CTA: negative for PE but showed bilateral lower lobe mucus plugging as well as bronchial wall thickening. \par  s/p 3L fluids in [facility] ED, required pressors, now weaned off pressors, midodrine, and fludrocortisone.\par  \par  - Weaned to room air as tolerated, Maintain SpO2 goal >92% \par  - Duoneb q6h PRN\par  - chest PT BID, incentive spirometry, aerobika.\plain\f1\fs20\ifsa9323\hich\f1\dbch\f1\loch\f1\cf2\fs20\strike\plain\f0\fs20\hqqo8864\hich\f0\dbch\f0\loch\f0\fs20\par  \par  \plain\f1\fs20\xmzd2892\hich\f1\dbch\f1\loch\f1\cf2\fs20\ul{\field{\*\fldinst HYPERLINK [id number] }{\fldrslt Problem/Plan - 5:}}\plain\f0\fs20\ydhl1432\hich\f0\dbch\f0\loch\f0\fs20\ql\par  \'b7  {\*\bkmkstart bo90236862107}{\*\bkmkend fm71590738123}Problem: {\*\bkmkstart mi85294057677}{\*\bkmkend ez10222270618}Pneumonia due to influenza A virus.\plain\f1\fs20\yukg9864\hich\f1\dbch\f1\loch\f1\cf2\fs20\strike\plain\f0\fs20\nhhi4652\hich\f0\dbch\f0\loch\f0\fs20\par  \'b7  {\*\bkmkstart jc05553236717}{\*\bkmkend wr34627714942}Plan: {\*\bkmkstart zj44854403698}{\*\bkmkend ff25568237740}(RESOLVED)\par  Patient met sepsis criteria on admission (HR >90, RR >20). RVP +influenza A. UA neg for UTI. Lactate elevated to 4.6. Hypotension refractory to 3L fluids.  \par  Lactate cleared s/p 3L fluids. s/p CTX 1g and azithromycin in ED. Procal (after receiving abx) 1.05.\par  - s/p Tamiflu 30mg q24h x5d (1/12 - 1/16) \par  - BCx NGTD\par  - Urine legionella negative, MRSA neg.\plain\f1\fs20\rnow4907\hich\f1\dbch\f1\loch\f1\cf2\fs20\strike\plain\f0\fs20\tdgl9476\hich\f0\dbch\f0\loch\f0\fs20\par  \par  \plain\f1\fs20\kuzt3233\hich\f1\dbch\f1\loch\f1\cf2\fs20\ul{\field{\*\fldinst HYPERLINK [id number] }{\fldrslt Problem/Plan - 6:}}\plain\f0\fs20\hput8958\hich\f0\dbch\f0\loch\f0\fs20\ql\par  \'b7  {\*\bkmkstart dx01632706142}{\*\bkmkend hg48260136325}Problem: {\*\bkmkstart vx08965394325}{\*\bkmkend gt98198879974}Hypothyroidism.\plain\f1\fs20\yeqs7177\hich\f1\dbch\f1\loch\f1\cf2\fs20\strike\plain\f0\fs20\igtm6326\hich\f0\dbch\f0\loch\f0\fs20\par  \'b7  {\*\bkmkstart gn98069471953}{\*\bkmkend wm87973697835}Plan: {\*\bkmkstart ib31718589024}{\*\bkmkend qr86905129597}Previously prescribed Synthroid 25mcg qd filled 12/12/25 per the  shop \par  TSH 1.18; T4 4.52\par  - c/w home dose of levothyroxine 25mcg.\plain\f1\fs20\nlpe8288\hich\f1\dbch\f1\loch\f1\cf2\fs20\strike\plain\f0\fs20\lyxq8325\hich\f0\dbch\f0\loch\f0\fs20\par  \par  \plain\f1\fs20\tzol9550\hich\f1\dbch\f1\loch\f1\cf2\fs20\ul{\field{\*\fldinst HYPERLINK [id number] }{\fldrslt Problem/Plan - 7:}}\plain\f0\fs20\zrhq6308\hich\f0\dbch\f0\loch\f0\fs20\ql\par  \'b7  {\*\bkmkstart kc29890771620}{\*\bkmkend nt07812182009}Problem: {\*\bkmkstart ed82334146085}{\*\bkmkend de98322517015}MDD (major depressive disorder).\plain\f1\fs20\ysnt7394\hich\f1\dbch\f1\loch\f1\cf2\fs20\strike\plain\f0\fs20\cenv6243\hich\f0\dbch\f0\loch\f0\fs20\par  \'b7  {\*\bkmkstart wh59862139727}{\*\bkmkend bb06310090939}Plan: {\*\bkmkstart cs43490033191}{\*\bkmkend pq81530050412}#MDD\par  #h/o schizoaffective disorder\par  Psychiatrist Dr. Corrales. Per chart review, has previously been prescribed Wellbutrin, risperidone, clonazepam, gabapentin; reports only taking Wellbutrin. \par  Home med: Wellbutrin SR 200mg BID, iSTOP with clonazepam filled monthly last filled 12/18 and per discussion today with patient's PCP he has most recently been rx'd risperidone, clonazepam, Wellbutrin (per the  shop last filled on 12/19/24)\par  - c/w buproprion 300mg qd (therapeutic interchange for Wellbutrin 150mg BID) \par  - c/w risperidone 0.5mg qHS\par  - c/w clonazepam 0.5mg qHS.\plain\f1\fs20\jwof9829\hich\f1\dbch\f1\loch\f1\cf2\fs20\strike\plain\f0\fs20\wptg8093\hich\f0\dbch\f0\loch\f0\fs20\par  \par  \plain\f1\fs20\nsoa4073\hich\f1\dbch\f1\loch\f1\cf2\fs20\ul{\field{\*\fldinst HYPERLINK [id number] }{\fldrslt Problem/Plan - 8:}}\plain\f0\fs20\jwmy7074\hich\f0\dbch\f0\loch\f0\fs20\ql\par  \'b7  {\*\bkmkstart yk47224045739}{\*\bkmkend xe65896219933}Problem: {\*\bkmkstart nu48478883364}{\*\bkmkend ts84244974313}Anemia.\plain\f1\fs20\skno4754\hich\f1\dbch\f1\loch\f1\cf2\fs20\strike\plain\f0\fs20\ozya1082\hich\f0\dbch\f0\loch\f0\fs20\par  \'b7  {\*\bkmkstart ot03339349222}{\*\bkmkend yx24499154732}Plan: {\*\bkmkstart yi46789305726}{\*\bkmkend sw49103489743}Anemia likely anemia of chronic disease or from sepsis \par  Hgb 10.3 s/p 3L fluid resuscitation. Possible h/o pernicious anemia per [facility] chart review. \par  Folate 6.0, B12 596\par  - folic acid and MV as above\par  - Maintain active T&S.\plain\f1\fs20\lavo3907\hich\f1\dbch\f1\loch\f1\cf2\fs20\strike\plain\f0\fs20\heob4244\hich\f0\dbch\f0\loch\f0\fs20\par  \par  \plain\f1\fs20\zvoj7065\hich\f1\dbch\f1\loch\f1\cf2\fs20\ul{\field{\*\fldinst HYPERLINK [id number] }{\fldrslt Problem/Plan - 9:}}\plain\f0\fs20\tsrf2637\hich\f0\dbch\f0\loch\f0\fs20\ql\par  \'b7  {\*\bkmkstart ac84499762616}{\*\bkmkend tr98224350023}Problem: {\*\bkmkstart vr66049851108}{\*\bkmkend wc83652140393}Hyponatremia.\plain\f1\fs20\tmfb7586\hich\f1\dbch\f1\loch\f1\cf2\fs20\strike\plain\f0\fs20\kpyd5208\hich\f0\dbch\f0\loch\f0\fs20\par  \'b7  {\*\bkmkstart sv01100970691}{\*\bkmkend uy26202680833}Plan: {\*\bkmkstart hu52182476252}{\*\bkmkend xp41221434039}(RESOLVED) \par  Mild Hyponatremia(132) possibly 2/2 SIADH 2/2 influenza; appears euvolemic on exam.\plain\f1\fs20\vzsv0834\hich\f1\dbch\f1\loch\f1\cf2\fs20\strike\plain\f0\fs20\kwef9082\hich\f0\dbch\f0\loch\f0\fs20\par  \par  \plain\f1\fs20\ttlo0202\hich\f1\dbch\f1\loch\f1\cf2\fs20\ul{\field{\*\fldinst HYPERLINK [id number] }{\fldrslt Problem/Plan - 10:}}\plain\f0\fs20\orze0316\hich\f0\dbch\f0\loch\f0\fs20\ql\par  \'b7  {\*\bkmkstart mq15454417824}{\*\bkmkend zf34457680041}Problem: {\*\bkmkstart ci19976649363}{\*\bkmkend nl50186385209}Transaminitis.\plain\f1\fs20\qzfi1055\hich\f1\dbch\f1\loch\f1\cf2\fs20\strike\plain\f0\fs20\trbs6998\hich\f0\dbch\f0\loch\f0\fs20\par  \'b7  {\*\bkmkstart hw39135257484}{\*\bkmkend sy95556250933}Plan; {\*\bkmkstart ut33394307196}{\*\bkmkend se26620391301}RESOLVED (LFTs wnl)\par  , ALT 89. Ddx hypoperfusion iso septic shock vs alcohol hepatitis.\plain\f1\fs20\aodn2713\hich\f1\dbch\f1\loch\f1\cf2\fs20\strike\plain\f0\fs20\tkcw2763\hich\f0\dbch\f0\loch\f0\fs20\par  \par  \plain\f1\fs20\nhhv0414\hich\f1\dbch\f1\loch\f1\cf2\fs20\ul{\field{\*\fldinst HYPERLINK [id number] }{\fldrslt Problem/Plan - 11:}}\plain\f0\fs20\gkze9748\hich\f0\dbch\f0\loch\f0\fs20\ql\par  \'b7  {\*\bkmkstart tk524246980696}{\*\bkmkend lp224006122986}Problem: {\*\bkmkstart mw075398875959}{\*\bkmkend qn952350012122}History of alcohol use disorder.\plain\f1\fs20\qeov9630\hich\f1\dbch\f1\loch\f1\cf2\fs20\strike\plain\f0\fs20\kkto2652\hich\f0\dbch\f0\loch\f0\fs20\par  \'b7  {\*\bkmkstart vb481558016999}{\*\bkmkend yr342781960140}Plan: {\*\bkmkstart fs808739890458}{\*\bkmkend fj365731801765}H/o Alcohol use disorder \par  ~2 drinks per week, Last drink 1/10. Per chart review, was empirically treated for Wernicke\u8217 ?s encephalopathy in 2022 at [place]. Discussed with mary's PCP about AUD and she states he is not forthcoming about his alcohol intake and quantity   and frequency is not clear. \par  - IV thiamine 500mg qd x3d then 100mg qd\par  - c/w folic acid 1mg qd, multivitamin 1 tablet qd\par  - utox unable to be run x 2 due to acidic urine\par  -D/C [facility] protocol 1/22.\plain\f1\fs20\ataj9990\hich\f1\dbch\f1\loch\f1\cf2\fs20\strike\plain\f0\fs20\jmlm6799\hich\f0\dbch\f0\loch\f0\fs20\par  \par  \plain\f1\fs20\llfz4225\hich\f1\dbch\f1\loch\f1\cf2\fs20\ul{\field{\*\fldinst HYPERLINK [id number] }{\fldrslt Problem/Plan - 12:}}\plain\f0\fs20\kigz8942\hich\f0\dbch\f0\loch\f0\fs20\ql\par  \'b7  {\*\bkmkstart kp362948611146}{\*\bkmkend ie636361678762}Problem: {\*\bkmkstart uh868413319126}{\*\bkmkend gv302284450869}Prophylactic measure. \par  \'b7  {\*\bkmkstart uq059160269050}{\*\bkmkend zw695219817581}Plan: {\*\bkmkstart hn635292399443}{\*\bkmkend lt409796232698}F: none indicated\par  E: Replete \par  D: Consistent carbohydrate\par  GI PPx: None\par  VTE PPx: Lovenox \par  Code status: Full\par  Dispo: RMF.\par  \par  \par  }

## 2025-01-23 NOTE — PROGRESS NOTE ADULT - PROBLEM SELECTOR PLAN 10
H/o Alcohol use disorder   ~2 drinks per week, Last drink 1/10. Per chart review, was empirically treated for Wernicke’s encephalopathy in 2022 at [place]. Discussed with mary's PCP about AUD and she states he is not forthcoming about his alcohol intake and quantity and frequency is not clear.   - IV thiamine 500mg qd x3d then 100mg qd  - c/w folic acid 1mg qd, multivitamin 1 tablet qd  - utox unable to be run x 2 due to acidic urine  -D/C  [facility] protocol 1/22 RESOLVED (LFTs wnl)  , ALT 89. Ddx hypoperfusion iso septic shock vs alcohol hepatitis

## 2025-01-23 NOTE — PROGRESS NOTE ADULT - PROBLEM SELECTOR PLAN 5
#Chronic Dizziness and unsteady gait  Patient c/o dizziness x 2 weeks. Per patient's PCP he has been chronically been c/o dizziness x months and has been having gait difficulties  - Head CT w Moderate severity chronic white matter microvascular type changes.  - vEEG read from 1/16: no evidence of seizures  - f/u MRA Neck w/wo IV contrast negative  - c/w meclizine 25mg q12h PRN   - c/w thiamine 100mg qd (RESOLVED)  Patient met sepsis criteria on admission (HR >90, RR >20). RVP +influenza A. UA neg for UTI. Lactate elevated to 4.6. Hypotension refractory to 3L fluids.    Lactate cleared s/p 3L fluids. s/p CTX 1g and azithromycin in ED. Procal (after receiving abx) 1.05.  - s/p Tamiflu 30mg q24h x5d (1/12 - 1/16)   - BCx NGTD  - Urine legionella negative, MRSA neg.

## 2025-01-24 VITALS
HEART RATE: 72 BPM | DIASTOLIC BLOOD PRESSURE: 98 MMHG | OXYGEN SATURATION: 95 % | SYSTOLIC BLOOD PRESSURE: 131 MMHG | TEMPERATURE: 97 F | RESPIRATION RATE: 19 BRPM

## 2025-01-24 LAB
ANION GAP SERPL CALC-SCNC: 13 MMOL/L — SIGNIFICANT CHANGE UP (ref 5–17)
BASOPHILS # BLD AUTO: 0.07 K/UL — SIGNIFICANT CHANGE UP (ref 0–0.2)
BASOPHILS NFR BLD AUTO: 0.8 % — SIGNIFICANT CHANGE UP (ref 0–2)
BLD GP AB SCN SERPL QL: NEGATIVE — SIGNIFICANT CHANGE UP
BUN SERPL-MCNC: 29 MG/DL — HIGH (ref 7–23)
CALCIUM SERPL-MCNC: 8.7 MG/DL — SIGNIFICANT CHANGE UP (ref 8.4–10.5)
CHLORIDE SERPL-SCNC: 101 MMOL/L — SIGNIFICANT CHANGE UP (ref 96–108)
CO2 SERPL-SCNC: 23 MMOL/L — SIGNIFICANT CHANGE UP (ref 22–31)
CREAT SERPL-MCNC: 1.34 MG/DL — HIGH (ref 0.5–1.3)
EGFR: 57 ML/MIN/1.73M2 — LOW
EOSINOPHIL # BLD AUTO: 0.38 K/UL — SIGNIFICANT CHANGE UP (ref 0–0.5)
EOSINOPHIL NFR BLD AUTO: 4.2 % — SIGNIFICANT CHANGE UP (ref 0–6)
GLUCOSE SERPL-MCNC: 88 MG/DL — SIGNIFICANT CHANGE UP (ref 70–99)
HCT VFR BLD CALC: 33.6 % — LOW (ref 39–50)
HGB BLD-MCNC: 11.5 G/DL — LOW (ref 13–17)
IMM GRANULOCYTES NFR BLD AUTO: 0.6 % — SIGNIFICANT CHANGE UP (ref 0–0.9)
LYMPHOCYTES # BLD AUTO: 3.16 K/UL — SIGNIFICANT CHANGE UP (ref 1–3.3)
LYMPHOCYTES # BLD AUTO: 35.1 % — SIGNIFICANT CHANGE UP (ref 13–44)
MAGNESIUM SERPL-MCNC: 1.8 MG/DL — SIGNIFICANT CHANGE UP (ref 1.6–2.6)
MCHC RBC-ENTMCNC: 32.6 PG — SIGNIFICANT CHANGE UP (ref 27–34)
MCHC RBC-ENTMCNC: 34.2 G/DL — SIGNIFICANT CHANGE UP (ref 32–36)
MCV RBC AUTO: 95.2 FL — SIGNIFICANT CHANGE UP (ref 80–100)
MONOCYTES # BLD AUTO: 1.26 K/UL — HIGH (ref 0–0.9)
MONOCYTES NFR BLD AUTO: 14 % — SIGNIFICANT CHANGE UP (ref 2–14)
NEUTROPHILS # BLD AUTO: 4.08 K/UL — SIGNIFICANT CHANGE UP (ref 1.8–7.4)
NEUTROPHILS NFR BLD AUTO: 45.3 % — SIGNIFICANT CHANGE UP (ref 43–77)
NRBC # BLD: 0 /100 WBCS — SIGNIFICANT CHANGE UP (ref 0–0)
NRBC BLD-RTO: 0 /100 WBCS — SIGNIFICANT CHANGE UP (ref 0–0)
PHOSPHATE SERPL-MCNC: 2.6 MG/DL — SIGNIFICANT CHANGE UP (ref 2.5–4.5)
PLATELET # BLD AUTO: 499 K/UL — HIGH (ref 150–400)
POTASSIUM SERPL-MCNC: 4.1 MMOL/L — SIGNIFICANT CHANGE UP (ref 3.5–5.3)
POTASSIUM SERPL-SCNC: 4.1 MMOL/L — SIGNIFICANT CHANGE UP (ref 3.5–5.3)
RBC # BLD: 3.53 M/UL — LOW (ref 4.2–5.8)
RBC # FLD: 13.6 % — SIGNIFICANT CHANGE UP (ref 10.3–14.5)
RH IG SCN BLD-IMP: POSITIVE — SIGNIFICANT CHANGE UP
SODIUM SERPL-SCNC: 137 MMOL/L — SIGNIFICANT CHANGE UP (ref 135–145)
WBC # BLD: 9 K/UL — SIGNIFICANT CHANGE UP (ref 3.8–10.5)
WBC # FLD AUTO: 9 K/UL — SIGNIFICANT CHANGE UP (ref 3.8–10.5)

## 2025-01-24 PROCEDURE — 99239 HOSP IP/OBS DSCHRG MGMT >30: CPT | Mod: GC

## 2025-01-24 RX ADMIN — LEVOTHYROXINE SODIUM 25 MICROGRAM(S): 25 TABLET ORAL at 06:10

## 2025-01-24 NOTE — PROGRESS NOTE ADULT - PROBLEM SELECTOR PLAN 6
Previously prescribed Synthroid 25mcg qd filled 12/12/25 per the  shop   TSH 1.18; T4 4.52  - c/w home dose of levothyroxine 25mcg 12-Apr-2024 11:43

## 2025-01-24 NOTE — PROGRESS NOTE ADULT - PROBLEM SELECTOR PLAN 11
H/o Alcohol use disorder   ~2 drinks per week, Last drink 1/10. Per chart review, was empirically treated for Wernicke’s encephalopathy in 2022 at [place]. Discussed with mary's PCP about AUD and she states he is not forthcoming about his alcohol intake and quantity and frequency is not clear.   - IV thiamine 500mg qd x3d then 100mg qd  - c/w folic acid 1mg qd, multivitamin 1 tablet qd  - utox unable to be run x 2 due to acidic urine  -D/C [facility] protocol 1/22

## 2025-01-24 NOTE — PROGRESS NOTE ADULT - NUTRITIONAL ASSESSMENT
This patient has been assessed with a concern for Malnutrition and has been determined to have a diagnosis/diagnoses of Severe protein-calorie malnutrition.    This patient is being managed with:   Diet Regular-  1500mL Fluid Restriction ([id number])  Supplement Feeding Modality:  Oral  Ensure Plus High Protein Cans or Servings Per Day:  1       Frequency:  Two Times a day  Entered: Jan 14 2025  6:15PM  
This patient has been assessed with a concern for Malnutrition and has been determined to have a diagnosis/diagnoses of Severe protein-calorie malnutrition.    This patient is being managed with:   Diet Regular-  Supplement Feeding Modality:  Oral  Ensure Plus High Protein Cans or Servings Per Day:  1       Frequency:  Two Times a day  Entered: [place] 15 2025 12:24PM  
This patient has been assessed with a concern for Malnutrition and has been determined to have a diagnosis/diagnoses of Severe protein-calorie malnutrition.    This patient is being managed with:   Diet Regular-  Supplement Feeding Modality:  Oral  Ensure Plus High Protein Cans or Servings Per Day:  1       Frequency:  Two Times a day  Entered: Jan 13 2025  3:04PM  
This patient has been assessed with a concern for Malnutrition and has been determined to have a diagnosis/diagnoses of Severe protein-calorie malnutrition.    This patient is being managed with:   Diet Regular-  Supplement Feeding Modality:  Oral  Ensure Plus High Protein Cans or Servings Per Day:  1       Frequency:  Two Times a day  Entered: [place] 15 2025 12:24PM  
This patient has been assessed with a concern for Malnutrition and has been determined to have a diagnosis/diagnoses of Severe protein-calorie malnutrition.    This patient is being managed with:   Diet Consistent Carbohydrate w/Evening Snack-  Low Sodium  Entered: Jan 21 2025  7:42PM  
This patient has been assessed with a concern for Malnutrition and has been determined to have a diagnosis/diagnoses of Severe protein-calorie malnutrition.    This patient is being managed with:   Diet Consistent Carbohydrate w/Evening Snack-  Low Sodium  Entered: Jan 21 2025  7:42PM  
This patient has been assessed with a concern for Malnutrition and has been determined to have a diagnosis/diagnoses of Severe protein-calorie malnutrition.    This patient is being managed with:   Diet Regular-  Supplement Feeding Modality:  Oral  Ensure Plus High Protein Cans or Servings Per Day:  1       Frequency:  Two Times a day  Entered: [place] 15 2025 12:24PM  
This patient has been assessed with a concern for Malnutrition and has been determined to have a diagnosis/diagnoses of Severe protein-calorie malnutrition.    This patient is being managed with:   Diet Consistent Carbohydrate w/Evening Snack-  Low Sodium  Entered: Jan 21 2025  7:42PM

## 2025-01-24 NOTE — PROGRESS NOTE ADULT - PROBLEM SELECTOR PLAN 8
Anemia likely anemia of chronic disease or from sepsis   Hgb 10.3 s/p 3L fluid resuscitation. Possible h/o pernicious anemia per [facility] chart review.   Folate 6.0, B12 596  - folic acid and MV as above  - Maintain active T&S

## 2025-01-24 NOTE — PROGRESS NOTE ADULT - PROBLEM SELECTOR PLAN 12
F: none indicated  E: Replete   D: Consistent carbohydrate  GI PPx: None  VTE PPx: Lovenox   Code status: Full  Dispo: [facility]
F: none indicated  E: Replete   D: Consistent carbohydrate  GI PPx: None  VTE PPx: Lovenox   Code status: Full  Dispo: [facility]
F: s/p 500mL LR  E: Replete K>4 Mg>2 Phos>3  D: Regular with Ensure High Protein BID  GI PPx: None  VTE PPx: Lovenox   Code status: Full  Dispo: [facility]
F: none indicated  E: Replete   D: Consistent carbohydrate  GI PPx: None  VTE PPx: Lovenox   Code status: Full  Dispo: [facility]
F: s/p 500mL LR  E: Replete K>4 Mg>2 Phos>3  D: Regular with Ensure High Protein BID  GI PPx: None  VTE PPx: Lovenox   Code status: Full  Dispo: [facility]

## 2025-01-24 NOTE — PROGRESS NOTE ADULT - PROBLEM SELECTOR PLAN 1
#Cerebellar stroke with Dizziness and Ataxia    MRI S/o acute/subacute stroke in posterior circulation. MRA neck and CTA head unremarkable  vEEG read from 1/16: no evidence of seizures  TTE negative for PFO (normal biventricular fxn).  Lipid panel (Total: 85 HDL: 31 LDL: 36)     - fu neuro and stroke recs  - c/w aspirin and statin 80mg qhs  - c/w meclizine 25mg q12h PRN   - c/w thiamine 100mg qd

## 2025-01-24 NOTE — PROGRESS NOTE ADULT - PROBLEM SELECTOR PLAN 3
SHIRA on admission likely prerenal 2/2 hypovolemia   Cr 1.3-1.4 in 2022 and per PCP 1.27 in August 2024.   P/w Cr 2.24, suspect pre-renal iso septic shock and decreased po intake. UA spec grav elevated 1.046, CK 1026, FeNa 0.9%   1/23 acute increase in Cr (1.1 -> 1.5), suspect contrast-induced nephropathy iso CTA on 1/21    - f/u urine studies  - 1L LR over 2 hrs  - CTM Cr

## 2025-01-24 NOTE — PROGRESS NOTE ADULT - PROBLEM SELECTOR PLAN 4
#Acute hypoxic respiratory failure  #Influenza PNA  RESOLVED  CTA: negative for PE but showed bilateral lower lobe mucus plugging as well as bronchial wall thickening.   s/p 3L fluids in [facility] ED, required pressors, now weaned off pressors, midodrine, and fludrocortisone.    - Weaned to room air as tolerated, Maintain SpO2 goal >92%   - Duoneb q6h PRN  - chest PT BID, incentive spirometry, aerobika

## 2025-01-24 NOTE — PROGRESS NOTE ADULT - PROBLEM SELECTOR PLAN 7
#MDD  #h/o schizoaffective disorder  Psychiatrist Dr. Corrales. Per chart review, has previously been prescribed Wellbutrin, risperidone, clonazepam, gabapentin; reports only taking Wellbutrin.   Home med: Wellbutrin SR 200mg BID, iSTOP with clonazepam filled monthly last filled 12/18 and per discussion today with patient's PCP he has most recently been rx'd risperidone, clonazepam, Wellbutrin (per the  shop last filled on 12/19/24)  - c/w buproprion 300mg qd (therapeutic interchange for Wellbutrin 150mg BID)   - c/w risperidone 0.5mg qHS  - c/w clonazepam 0.5mg qHS

## 2025-01-24 NOTE — PROGRESS NOTE ADULT - REASON FOR ADMISSION
fever, cough, SOB
Septic shock 2/2 influenza
fever, cough, SOB

## 2025-01-24 NOTE — PROGRESS NOTE ADULT - PROVIDER SPECIALTY LIST ADULT
Neurology
Neurology
Rehab Medicine
Internal Medicine
MICU
MICU
Neurology
Internal Medicine
Internal Medicine
Rehab Medicine
Rehab Medicine
Internal Medicine
MICU
Internal Medicine
Internal Medicine
Hospitalist
Internal Medicine

## 2025-01-24 NOTE — PROGRESS NOTE ADULT - SUBJECTIVE AND OBJECTIVE BOX
OVERNIGHT EVENTS/INTERVAL HPI: NAEO    Subjective: resting comfortably, no acute complaints.    PHYSICAL EXAM:  General: in no acute distress  Eyes: EOMI intact bilaterally. Anicteric sclerae, moist conjunctivae  HENT: Moist mucous membranes  Lungs: CTA B/L. No wheezes, rales, or rhonchi  Cardiovascular: RRR. No murmurs, rubs, or gallops  Abdomen: Soft, non-tender non-distended; No rebound or guarding  Extremities: WWP, No clubbing, cyanosis or edema  Neurological: Alert and oriented  Skin: Warm and dry. No obvious rash     VITAL SIGNS:  T(C): 36.7 (01-23-25 @ 05:26), Max: 36.9 (01-22-25 @ 09:00)  HR: 66 (01-23-25 @ 05:26) (66 - 79)  BP: 114/73 (01-23-25 @ 05:26) (97/71 - 114/73)  RR: 18 (01-23-25 @ 05:26) (17 - 18)  SpO2: 92% (01-23-25 @ 05:26) (92% - 96%)    albuterol/ipratropium for Nebulization 3 milliLiter(s) Nebulizer every 6 hours PRN  aspirin  chewable 81 milliGRAM(s) Oral daily  atorvastatin 80 milliGRAM(s) Oral at bedtime  buPROPion XL (24-Hour) . 300 milliGRAM(s) Oral daily  cholecalciferol 1000 Unit(s) Oral daily  clonazePAM  Tablet 0.5 milliGRAM(s) Oral at bedtime  dextrose 5%. 1000 milliLiter(s) IV Continuous <Continuous>  dextrose 5%. 1000 milliLiter(s) IV Continuous <Continuous>  dextrose 50% Injectable 25 Gram(s) IV Push once  dextrose 50% Injectable 12.5 Gram(s) IV Push once  dextrose 50% Injectable 25 Gram(s) IV Push once  dextrose Oral Gel 15 Gram(s) Oral once PRN  enoxaparin Injectable 40 milliGRAM(s) SubCutaneous every 24 hours  folic acid 1 milliGRAM(s) Oral daily  glucagon  Injectable 1 milliGRAM(s) IntraMuscular once  influenza  Vaccine (HIGH DOSE) 0.5 milliLiter(s) IntraMuscular once  insulin lispro (ADMELOG) corrective regimen sliding scale   SubCutaneous Before meals and at bedtime  levothyroxine 25 MICROGram(s) Oral daily  meclizine 25 milliGRAM(s) Oral every 12 hours PRN  multivitamin  Chewable 1 Tablet(s) Oral daily  polyethylene glycol 3350 17 Gram(s) Oral daily  risperiDONE   Tablet 0.5 milliGRAM(s) Oral at bedtime  senna 2 Tablet(s) Oral at bedtime  thiamine 100 milliGRAM(s) Oral daily      Consultant(s) Notes Reviewed:  [x] YES  [ ] NO  Care Discussed with Consultants/Other Providers [x] YES  [ ] NO    LABS:              CAPILLARY BLOOD GLUCOSE      POCT Blood Glucose.: 129 mg/dL (22 Jan 2025 22:07)  POCT Blood Glucose.: 108 mg/dL (22 Jan 2025 18:04)  POCT Blood Glucose.: 119 mg/dL (22 Jan 2025 12:59)  POCT Blood Glucose.: 97 mg/dL (22 Jan 2025 09:26)            RADIOLOGY, EKG, & ADDITIONAL TESTS:      Imaging Personally Reviewed:  [x] YES  [ ] NO

## 2025-01-24 NOTE — PROGRESS NOTE ADULT - PROBLEM SELECTOR PLAN 2
Patient c/o dizziness x 2 weeks. Per patient's PCP he has been chronically been c/o dizziness x months and has been having gait difficulties    - plan as above

## 2025-01-24 NOTE — PROGRESS NOTE ADULT - PROBLEM SELECTOR PLAN 5
(RESOLVED)  Patient met sepsis criteria on admission (HR >90, RR >20). RVP +influenza A. UA neg for UTI. Lactate elevated to 4.6. Hypotension refractory to 3L fluids.    Lactate cleared s/p 3L fluids. s/p CTX 1g and azithromycin in ED. Procal (after receiving abx) 1.05.  - s/p Tamiflu 30mg q24h x5d (1/12 - 1/16)   - BCx NGTD  - Urine legionella negative, MRSA neg.

## 2025-01-29 DIAGNOSIS — F25.9 SCHIZOAFFECTIVE DISORDER, UNSPECIFIED: ICD-10-CM

## 2025-01-29 DIAGNOSIS — Z87.891 PERSONAL HISTORY OF NICOTINE DEPENDENCE: ICD-10-CM

## 2025-01-29 DIAGNOSIS — E78.5 HYPERLIPIDEMIA, UNSPECIFIED: ICD-10-CM

## 2025-01-29 DIAGNOSIS — I10 ESSENTIAL (PRIMARY) HYPERTENSION: ICD-10-CM

## 2025-01-29 DIAGNOSIS — R65.21 SEVERE SEPSIS WITH SEPTIC SHOCK: ICD-10-CM

## 2025-01-29 DIAGNOSIS — E87.20 ACIDOSIS, UNSPECIFIED: ICD-10-CM

## 2025-01-29 DIAGNOSIS — J10.01 INFLUENZA DUE TO OTHER IDENTIFIED INFLUENZA VIRUS WITH THE SAME OTHER IDENTIFIED INFLUENZA VIRUS PNEUMONIA: ICD-10-CM

## 2025-01-29 DIAGNOSIS — Z79.84 LONG TERM (CURRENT) USE OF ORAL HYPOGLYCEMIC DRUGS: ICD-10-CM
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
## 2025-01-29 DIAGNOSIS — R42 DIZZINESS AND GIDDINESS: ICD-10-CM

## 2025-01-29 DIAGNOSIS — T50.8X5A ADVERSE EFFECT OF DIAGNOSTIC AGENTS, INITIAL ENCOUNTER: ICD-10-CM

## 2025-01-29 DIAGNOSIS — T46.6X6A UNDERDOSING OF ANTIHYPERLIPIDEMIC AND ANTIARTERIOSCLEROTIC DRUGS, INITIAL ENCOUNTER: ICD-10-CM

## 2025-01-29 DIAGNOSIS — I24.89 OTHER FORMS OF ACUTE ISCHEMIC HEART DISEASE: ICD-10-CM

## 2025-01-29 DIAGNOSIS — F10.90 ALCOHOL USE, UNSPECIFIED, UNCOMPLICATED: ICD-10-CM

## 2025-01-29 DIAGNOSIS — I69.398 OTHER SEQUELAE OF CEREBRAL INFARCTION: ICD-10-CM

## 2025-01-29 DIAGNOSIS — E22.2 SYNDROME OF INAPPROPRIATE SECRETION OF ANTIDIURETIC HORMONE: ICD-10-CM

## 2025-01-29 DIAGNOSIS — R74.01 ELEVATION OF LEVELS OF LIVER TRANSAMINASE LEVELS: ICD-10-CM

## 2025-01-29 DIAGNOSIS — J12.89 OTHER VIRAL PNEUMONIA: ICD-10-CM

## 2025-01-29 DIAGNOSIS — E86.1 HYPOVOLEMIA: ICD-10-CM

## 2025-01-29 DIAGNOSIS — E83.42 HYPOMAGNESEMIA: ICD-10-CM

## 2025-01-29 DIAGNOSIS — I63.9 CEREBRAL INFARCTION, UNSPECIFIED: ICD-10-CM

## 2025-01-29 DIAGNOSIS — D63.8 ANEMIA IN OTHER CHRONIC DISEASES CLASSIFIED ELSEWHERE: ICD-10-CM

## 2025-01-29 DIAGNOSIS — Z71.3 DIETARY COUNSELING AND SURVEILLANCE: ICD-10-CM

## 2025-01-29 DIAGNOSIS — E43 UNSPECIFIED SEVERE PROTEIN-CALORIE MALNUTRITION: ICD-10-CM

## 2025-01-29 DIAGNOSIS — R00.1 BRADYCARDIA, UNSPECIFIED: ICD-10-CM

## 2025-01-29 DIAGNOSIS — A41.89 OTHER SPECIFIED SEPSIS: ICD-10-CM

## 2025-01-29 DIAGNOSIS — J96.01 ACUTE RESPIRATORY FAILURE WITH HYPOXIA: ICD-10-CM

## 2025-01-29 DIAGNOSIS — I25.10 ATHEROSCLEROTIC HEART DISEASE OF NATIVE CORONARY ARTERY WITHOUT ANGINA PECTORIS: ICD-10-CM

## 2025-01-29 DIAGNOSIS — T50.1X6A UNDERDOSING OF LOOP [HIGH-CEILING] DIURETICS, INITIAL ENCOUNTER: ICD-10-CM
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
## 2025-01-29 DIAGNOSIS — T46.4X6A UNDERDOSING OF ANGIOTENSIN-CONVERTING-ENZYME INHIBITORS, INITIAL ENCOUNTER: ICD-10-CM

## 2025-01-29 DIAGNOSIS — N17.9 ACUTE KIDNEY FAILURE, UNSPECIFIED: ICD-10-CM

## 2025-01-29 DIAGNOSIS — E03.9 HYPOTHYROIDISM, UNSPECIFIED: ICD-10-CM

## 2025-01-29 DIAGNOSIS — E11.9 TYPE 2 DIABETES MELLITUS WITHOUT COMPLICATIONS: ICD-10-CM

## 2025-01-29 DIAGNOSIS — R29.700 NIHSS SCORE 0: ICD-10-CM

## 2025-01-29 DIAGNOSIS — Z59.01 SHELTERED HOMELESSNESS: ICD-10-CM

## 2025-01-29 DIAGNOSIS — F32.9 MAJOR DEPRESSIVE DISORDER, SINGLE EPISODE, UNSPECIFIED: ICD-10-CM

## 2025-01-29 SDOH — ECONOMIC STABILITY - HOUSING INSECURITY: SHELTERED HOMELESSNESS: Z59.01

## 2025-02-06 NOTE — ASSESSMENT
[FreeTextEntry1] :     Plan: Continue current medication regimen for secondary stroke prevention; ASA 81mg and Atorvastatin 20mg daily LDL goal <70 PT/OT check b12 alcohol use ILR ENT for tinnitus/hearing loss Continue aggressive vascular risk factor control with PCP, BP goal <130/80 Counselled on healthy eating (DASH/Mediterranean diet, limiting red meats and fried foods) Counselled on importance of regular exercise and remaining active Counselled on f/u with PCP regarding regular health maintenance and prevention, including routine screening Counselled on signs of stroke BEFAST and to call 911 with any new or worsening neurological symptoms   More than 50 % of time was used to discuss treatment, therapeutic plan, and prognosis, and patient was counseled on lifestyle, coping, and physical and emotional wellbeing.

## 2025-02-07 ENCOUNTER — APPOINTMENT (OUTPATIENT)
Dept: NEUROLOGY | Facility: CLINIC | Age: 70
End: 2025-02-07

## 2025-02-07 NOTE — HISTORY OF PRESENT ILLNESS
[FreeTextEntry1] : Getachew Atkinson is a 69-year-old M w/PMH (per chart review) EtOH use disorder, HTN, ?hypothyroidism, ?schizoaffective disorder, CAD, ?CKD, diabetes who was BIBEMS to [facility] from shelter for SOB, cough, fevers, and malaise admitted to MICU for septic shock 2/2 influenza. Neurology consulted for dizziness which are intermittent exacerbated by walking. MRI H revealed acute/subacute stroke on right parietal, right occipital . MRA N and CTA H with no significant stenosis or occlusion. He now presents to the office for post hospitalization follow-up.   Hospital course: January 12-January 24, 2025, Seen by Dr. Blood Imaging/tests: EEG (1/16/25) Normal study TTE (1/13) unremarkable MAURO: no thrombus seen, no PFO, normal EF< minimal plaque descending aorta MRI H: Small foci of restricted diffusion in the right parietal lobe and right occipital lobe and left parietal lobe possible related to subacute/chronic small vessel disease cerebral infarctions MRA N: unremarkable CTA H: unremarkable  Hospital labs: A1c: 6.4%, LDL 36,  Neurology consulted for intermittent, positional dizziness only on standing w/ hearing loss/tinnitus. Orthostatics negative.    HPI: Current Medications: Stroke Story/Symptoms: Imaging: Lab Work: BP: Diet: Exercise: PEPPER: Family History: Smoking: Alcohol: Drug Use:

## 2025-02-07 NOTE — HISTORY OF PRESENT ILLNESS
[FreeTextEntry1] : Getachew Atkinson is a 69-year-old M w/PMH (per chart review) EtOH use disorder, HTN, ?hypothyroidism, ?schizoaffective disorder, CAD, ?CKD, diabetes who was BIBEMS to [facility] from shelter for SOB, cough, fevers, and malaise admitted to MICU for septic shock 2/2 influenza. Neurology consulted for dizziness which are intermittent exacerbated by walking. MRI H revealed acute/subacute stroke on right parietal, right occipital . MRA N and CTA H with no significant stenosis or occlusion. He now presents to the office for post hospitalization follow-up.   Hospital course: January 12-January 24, 2025, Seen by Dr. Blood Imaging/tests: EEG (1/16/25) Normal study TTE (1/13) unremarkable MAURO: no thrombus seen, no PFO, normal EF< minimal plaque descending aorta MRI H: Small foci of restricted diffusion in the right parietal lobe and right occipital lobe and left parietal lobe possible related to subacute/chronic small vessel disease cerebral infarctions MRA N: unremarkable CTA H: unremarkable  Hospital labs: A1c: 6.4%, LDL 36,  Neurology consulted for intermittent, positional dizziness only on standing w/ hearing loss/tinnitus. Orthostatics negative.    HPI: Current Medications: Stroke Story/Symptoms: Imaging: Lab Work: BP: Diet: Exercise: PEPPER: Family History: Smoking: Alcohol: Drug Use:

## 2025-02-07 NOTE — HISTORY OF PRESENT ILLNESS
[FreeTextEntry1] : Getachew Atkinson is a 69-year-old M w/PMH (per chart review) EtOH use disorder, HTN, ?hypothyroidism, ?schizoaffective disorder, CAD, ?CKD, diabetes who was BIBEMS to [facility] from shelter for SOB, cough, fevers, and malaise admitted to MICU for septic shock 2/2 influenza. Neurology consulted for dizziness which are intermittent exacerbated by walking. MRI H revealed acute/subacute stroke on right parietal, right occipital . MRA N and CTA H with no significant stenosis or occlusion. He now presents to the office for post hospitalization follow-up.   Hospital course: January 12-January 24, 2025, Seen by Dr. Blood Imaging/tests: EEG (1/16/25) Normal study TTE (1/13) unremarkable MAURO: no thrombus seen, no PFO, normal EF< minimal plaque descending aorta MRI H: Small foci of restricted diffusion in the right parietal lobe and right occipital lobe and left parietal lobe possible related to subacute/chronic small vessel disease cerebral infarctions MRA N: unremarkable CTA H: unremarkable  Hospital labs: A1c: 6.4%, LDL 36,  Neurology consulted for intermittent, positional dizziness only on standing w/ hearing loss/tinnitus. Orthostatics negative.    HPI: Current Medications: Stroke Story/Symptoms: Imaging: Lab Work: BP: Diet: Exercise: PEPPER: Family History: Smoking: Alcohol: Drug Use:

## 2025-02-11 RX ORDER — BUPROPION HYDROCHLORIDE 150 MG/1
1 TABLET, EXTENDED RELEASE ORAL
Refills: 0 | DISCHARGE

## 2025-04-01 NOTE — ED ADULT NURSE NOTE - BOWEL SOUNDS LLQ
Lab Results   Component Value Date    HGBA1C 5.9 (H) 01/08/2025   Current HbA1c represents improved control. Blood sugars demonstrating excellent control likely due to Ozempic and dietary modifications.   Continue current regimen  Advised to adhere to diabetic diet, and recommended staying active/exercising routinely.  Discussed symptoms and treatment of hypoglycemia.    Recommended routine follow-up with Ophthalmology and foot exams.   Ordered blood work to complete prior to next visit.    Orders:  •  Hemoglobin A1C; Future  •  Comprehensive metabolic panel; Future  •  Albumin / creatinine urine ratio; Future  •  Lipid panel; Future   present